# Patient Record
Sex: FEMALE | Race: ASIAN | NOT HISPANIC OR LATINO | URBAN - METROPOLITAN AREA
[De-identification: names, ages, dates, MRNs, and addresses within clinical notes are randomized per-mention and may not be internally consistent; named-entity substitution may affect disease eponyms.]

---

## 2018-08-06 ENCOUNTER — EMERGENCY (EMERGENCY)
Facility: HOSPITAL | Age: 43
LOS: 1 days | Discharge: HOME | End: 2018-08-06
Attending: EMERGENCY MEDICINE
Payer: MEDICAID

## 2018-08-06 VITALS
RESPIRATION RATE: 18 BRPM | SYSTOLIC BLOOD PRESSURE: 124 MMHG | HEART RATE: 100 BPM | TEMPERATURE: 99 F | DIASTOLIC BLOOD PRESSURE: 84 MMHG | OXYGEN SATURATION: 99 %

## 2018-08-06 VITALS
DIASTOLIC BLOOD PRESSURE: 73 MMHG | RESPIRATION RATE: 16 BRPM | SYSTOLIC BLOOD PRESSURE: 110 MMHG | OXYGEN SATURATION: 99 % | TEMPERATURE: 98 F | HEART RATE: 77 BPM

## 2018-08-06 LAB
APPEARANCE UR: CLEAR — SIGNIFICANT CHANGE UP
APTT BLD: 31.1 SEC — SIGNIFICANT CHANGE UP (ref 27.5–37.4)
BASOPHILS # BLD AUTO: 0.1 K/UL — SIGNIFICANT CHANGE UP (ref 0–0.2)
BASOPHILS NFR BLD AUTO: 1 % — SIGNIFICANT CHANGE UP (ref 0–2)
BILIRUB UR-MCNC: NEGATIVE — SIGNIFICANT CHANGE UP
COLOR SPEC: YELLOW — SIGNIFICANT CHANGE UP
DIFF PNL FLD: NEGATIVE — SIGNIFICANT CHANGE UP
EOSINOPHIL # BLD AUTO: 0.3 K/UL — SIGNIFICANT CHANGE UP (ref 0–0.5)
EOSINOPHIL NFR BLD AUTO: 3.1 % — SIGNIFICANT CHANGE UP (ref 0–6)
GLUCOSE UR QL: 500 MG/DL
HCG SERPL-ACNC: <2 MIU/ML — SIGNIFICANT CHANGE UP
HCT VFR BLD CALC: 33.7 % — LOW (ref 34.5–45)
HGB BLD-MCNC: 10.8 G/DL — LOW (ref 11.5–15.5)
INR BLD: 1.17 RATIO — HIGH (ref 0.88–1.16)
KETONES UR-MCNC: NEGATIVE — SIGNIFICANT CHANGE UP
LEUKOCYTE ESTERASE UR-ACNC: NEGATIVE — SIGNIFICANT CHANGE UP
LYMPHOCYTES # BLD AUTO: 3.1 K/UL — SIGNIFICANT CHANGE UP (ref 1–3.3)
LYMPHOCYTES # BLD AUTO: 34.5 % — SIGNIFICANT CHANGE UP (ref 13–44)
MCHC RBC-ENTMCNC: 26.7 PG — LOW (ref 27–34)
MCHC RBC-ENTMCNC: 32 GM/DL — SIGNIFICANT CHANGE UP (ref 32–36)
MCV RBC AUTO: 83.4 FL — SIGNIFICANT CHANGE UP (ref 80–100)
MONOCYTES # BLD AUTO: 0.7 K/UL — SIGNIFICANT CHANGE UP (ref 0–0.9)
MONOCYTES NFR BLD AUTO: 7.3 % — SIGNIFICANT CHANGE UP (ref 2–14)
NEUTROPHILS # BLD AUTO: 4.9 K/UL — SIGNIFICANT CHANGE UP (ref 1.8–7.4)
NEUTROPHILS NFR BLD AUTO: 54.1 % — SIGNIFICANT CHANGE UP (ref 43–77)
NITRITE UR-MCNC: NEGATIVE — SIGNIFICANT CHANGE UP
PH UR: 5.5 — SIGNIFICANT CHANGE UP (ref 5–8)
PLATELET # BLD AUTO: 199 K/UL — SIGNIFICANT CHANGE UP (ref 150–400)
PROT UR-MCNC: NEGATIVE — SIGNIFICANT CHANGE UP
PROTHROM AB SERPL-ACNC: 12.8 SEC — HIGH (ref 9.8–12.7)
RBC # BLD: 4.04 M/UL — SIGNIFICANT CHANGE UP (ref 3.8–5.2)
RBC # FLD: 12.6 % — SIGNIFICANT CHANGE UP (ref 10.3–14.5)
SP GR SPEC: 1.02 — SIGNIFICANT CHANGE UP (ref 1.01–1.02)
UROBILINOGEN FLD QL: NEGATIVE — SIGNIFICANT CHANGE UP
WBC # BLD: 9.1 K/UL — SIGNIFICANT CHANGE UP (ref 3.8–10.5)
WBC # FLD AUTO: 9.1 K/UL — SIGNIFICANT CHANGE UP (ref 3.8–10.5)

## 2018-08-06 PROCEDURE — 87491 CHLMYD TRACH DNA AMP PROBE: CPT

## 2018-08-06 PROCEDURE — 74177 CT ABD & PELVIS W/CONTRAST: CPT | Mod: 26

## 2018-08-06 PROCEDURE — 84702 CHORIONIC GONADOTROPIN TEST: CPT

## 2018-08-06 PROCEDURE — 99284 EMERGENCY DEPT VISIT MOD MDM: CPT

## 2018-08-06 PROCEDURE — 99284 EMERGENCY DEPT VISIT MOD MDM: CPT | Mod: 25

## 2018-08-06 PROCEDURE — 85027 COMPLETE CBC AUTOMATED: CPT

## 2018-08-06 PROCEDURE — 87591 N.GONORRHOEAE DNA AMP PROB: CPT

## 2018-08-06 PROCEDURE — 85610 PROTHROMBIN TIME: CPT

## 2018-08-06 PROCEDURE — 74177 CT ABD & PELVIS W/CONTRAST: CPT

## 2018-08-06 PROCEDURE — 85730 THROMBOPLASTIN TIME PARTIAL: CPT

## 2018-08-06 PROCEDURE — 81003 URINALYSIS AUTO W/O SCOPE: CPT

## 2018-08-06 PROCEDURE — 80053 COMPREHEN METABOLIC PANEL: CPT

## 2018-08-06 PROCEDURE — 96374 THER/PROPH/DIAG INJ IV PUSH: CPT | Mod: XU

## 2018-08-06 RX ORDER — SODIUM CHLORIDE 9 MG/ML
1000 INJECTION, SOLUTION INTRAVENOUS ONCE
Qty: 0 | Refills: 0 | Status: COMPLETED | OUTPATIENT
Start: 2018-08-06 | End: 2018-08-06

## 2018-08-06 RX ORDER — ACETAMINOPHEN 500 MG
1000 TABLET ORAL ONCE
Qty: 0 | Refills: 0 | Status: COMPLETED | OUTPATIENT
Start: 2018-08-06 | End: 2018-08-06

## 2018-08-06 RX ADMIN — Medication 400 MILLIGRAM(S): at 15:05

## 2018-08-06 RX ADMIN — Medication 1000 MILLIGRAM(S): at 15:05

## 2018-08-06 RX ADMIN — SODIUM CHLORIDE 1000 MILLILITER(S): 9 INJECTION, SOLUTION INTRAVENOUS at 15:05

## 2018-08-06 RX ADMIN — SODIUM CHLORIDE 4000 MILLILITER(S): 9 INJECTION, SOLUTION INTRAVENOUS at 15:05

## 2018-08-06 NOTE — ED PROVIDER NOTE - MEDICAL DECISION MAKING DETAILS
Attending MD Mccormack: 43F with rlq abd pain x 2-3 days, exam with mild  ttp RLQ, ddx includes appendicitis, colitis, diverticulitis, symptomatic ovarian cyst. Plan for CT a/p to eval, labs, UA

## 2018-08-06 NOTE — ED PROVIDER NOTE - PROGRESS NOTE DETAILS
right ac iv pulled patient discharged in good condition with copy of results and discussed follow up OB and return precautions. FREDDIE Edwards PGY2

## 2018-08-06 NOTE — ED PROVIDER NOTE - ATTENDING CONTRIBUTION TO CARE
Attending MD Mccormack:  I personally have seen and examined this patient.  Resident note reviewed and agree on plan of care and except where noted.  See HPI, PE, and MDM for details.

## 2018-08-06 NOTE — ED PROVIDER NOTE - PHYSICAL EXAMINATION
Attending MD Mccormack: A & O x 3, NAD, EOMI b/l, PERRL b/l; lungs CTAB, heart with reg rhythm without murmur; abdomen soft, ND, mild ttp RLQ without r/g; extremities with no edema; affect appropriate. neuro exam non focal with no motor or sensory deficits. Attending MD Mccormack: A & O x 3, NAD, EOMI b/l, PERRL b/l; lungs CTAB, heart with reg rhythm without murmur; abdomen soft, ND, mild ttp RLQ without r/g; extremities with no edema; affect appropriate. neuro exam non focal with no motor or sensory deficits.    Gen: NAD, non-toxic, conversational  Eyes: PERRL, EOMI   HENT: Normocephalic, atraumatic. External ears normal, no rhinorrhea, moist mucous membranes.   CV: RRR, no M/R/G  Resp: CTAB, non-labored, speaking without difficulty on room air  Abd: soft, non tender, non rigid, no guarding or rebound tenderness  Back: No CVAT bilaterally, no midline ttp  Skin: dry, wwp   Neuro: AOx3, speech is fluent and appropriate  Psych: Mood concerned, affect euthymic Attending MD Mccormack: A & O x 3, NAD, EOMI b/l, PERRL b/l; lungs CTAB, heart with reg rhythm without murmur; abdomen soft, ND, mild ttp RLQ without r/g; extremities with no edema; affect appropriate. neuro exam non focal with no motor or sensory deficits.    Gen: NAD, non-toxic, conversational  Eyes: PERRL, EOMI   HENT: Normocephalic, atraumatic. External ears normal, no rhinorrhea, moist mucous membranes.   CV: RRR, no M/R/G  Resp: CTAB, non-labored, speaking without difficulty on room air  Abd: soft, non tender, non rigid, no guarding or rebound tenderness  Pelvic (chaperone: PA Student last name Major): Spec: cervix is visualized, closed, no bleeding/friability, with moderate-large amount of surrounding mucoid grey discharge. Bimanual: no cervical motion tenderness, no adnexal tenderness, no masses palpated.   Back: No CVAT bilaterally, no midline ttp  Skin: dry, wwp   Neuro: AOx3, speech is fluent and appropriate  Psych: Mood concerned, affect euthymic

## 2018-08-06 NOTE — ED ADULT NURSE NOTE - NSIMPLEMENTINTERV_GEN_ALL_ED
Implemented All Universal Safety Interventions:  [place] to call system. Call bell, personal items and telephone within reach. Instruct patient to call for assistance. Room bathroom lighting operational. Non-slip footwear when patient is off stretcher. Physically safe environment: no spills, clutter or unnecessary equipment. Stretcher in lowest position, wheels locked, appropriate side rails in place.

## 2018-08-06 NOTE — ED PROVIDER NOTE - OBJECTIVE STATEMENT
43F with rlq pain for the past 48-72 hrs, notes that she does not have a hx of uterine fibroids but has been concerned that she may have developed them or other uterine/ovarian pathology. Has not noticed any fevers/chills, no vomiting/diarrhea/constipation, no chest pain, no difficulties breathing, no shortness of breath. Does not have any pain with urination. Denies other symptoms of concern at this time.

## 2018-08-06 NOTE — ED ADULT NURSE NOTE - OBJECTIVE STATEMENT
44 y/o female presenting to ED for RLQ pain x 3 days. Pt states "it feels like it is a stabbing pain." LMP 6-8 months ago. Upon exam, pt A&Ox3 gross neuro intact, no difficulty speaking in complete sentences, abdomen soft tender to palpation RLQ, bowel sounds x 4, nondistended.  c/o urinary frequency & pain. Pt denies chest pain, sob, ha, n/v/d, , f/c, hematuria.  Seen by MD . Labs drawn & sent.

## 2018-08-07 LAB
C TRACH RRNA SPEC QL NAA+PROBE: SIGNIFICANT CHANGE UP
N GONORRHOEA RRNA SPEC QL NAA+PROBE: SIGNIFICANT CHANGE UP
SPECIMEN SOURCE: SIGNIFICANT CHANGE UP

## 2018-08-15 ENCOUNTER — LABORATORY RESULT (OUTPATIENT)
Age: 43
End: 2018-08-15

## 2018-08-15 ENCOUNTER — APPOINTMENT (OUTPATIENT)
Dept: OBGYN | Facility: CLINIC | Age: 43
End: 2018-08-15
Payer: SELF-PAY

## 2018-08-15 ENCOUNTER — OUTPATIENT (OUTPATIENT)
Dept: OUTPATIENT SERVICES | Facility: HOSPITAL | Age: 43
LOS: 1 days | End: 2018-08-15
Payer: SELF-PAY

## 2018-08-15 ENCOUNTER — RESULT CHARGE (OUTPATIENT)
Age: 43
End: 2018-08-15

## 2018-08-15 VITALS
SYSTOLIC BLOOD PRESSURE: 110 MMHG | HEIGHT: 69 IN | WEIGHT: 198.25 LBS | BODY MASS INDEX: 29.36 KG/M2 | DIASTOLIC BLOOD PRESSURE: 70 MMHG

## 2018-08-15 DIAGNOSIS — R10.2 PELVIC AND PERINEAL PAIN: ICD-10-CM

## 2018-08-15 DIAGNOSIS — Z83.3 FAMILY HISTORY OF DIABETES MELLITUS: ICD-10-CM

## 2018-08-15 DIAGNOSIS — N91.2 AMENORRHEA, UNSPECIFIED: ICD-10-CM

## 2018-08-15 DIAGNOSIS — N76.0 ACUTE VAGINITIS: ICD-10-CM

## 2018-08-15 PROCEDURE — 84439 ASSAY OF FREE THYROXINE: CPT

## 2018-08-15 PROCEDURE — 36415 COLL VENOUS BLD VENIPUNCTURE: CPT

## 2018-08-15 PROCEDURE — 36415 COLL VENOUS BLD VENIPUNCTURE: CPT | Mod: NC

## 2018-08-15 PROCEDURE — 87624 HPV HI-RISK TYP POOLED RSLT: CPT

## 2018-08-15 PROCEDURE — 87491 CHLMYD TRACH DNA AMP PROBE: CPT

## 2018-08-15 PROCEDURE — 87340 HEPATITIS B SURFACE AG IA: CPT

## 2018-08-15 PROCEDURE — 87389 HIV-1 AG W/HIV-1&-2 AB AG IA: CPT

## 2018-08-15 PROCEDURE — 87591 N.GONORRHOEAE DNA AMP PROB: CPT

## 2018-08-15 PROCEDURE — 84443 ASSAY THYROID STIM HORMONE: CPT

## 2018-08-15 PROCEDURE — 99203 OFFICE O/P NEW LOW 30 MIN: CPT | Mod: NC

## 2018-08-15 PROCEDURE — 84146 ASSAY OF PROLACTIN: CPT

## 2018-08-15 PROCEDURE — 81003 URINALYSIS AUTO W/O SCOPE: CPT

## 2018-08-15 PROCEDURE — 86780 TREPONEMA PALLIDUM: CPT

## 2018-08-15 PROCEDURE — 81003 URINALYSIS AUTO W/O SCOPE: CPT | Mod: NC,QW

## 2018-08-15 PROCEDURE — 83001 ASSAY OF GONADOTROPIN (FSH): CPT

## 2018-08-15 PROCEDURE — G0463: CPT

## 2018-08-15 PROCEDURE — 86803 HEPATITIS C AB TEST: CPT

## 2018-08-16 LAB
C TRACH RRNA SPEC QL NAA+PROBE: SIGNIFICANT CHANGE UP
FSH SERPL-MCNC: 2.1 IU/L — SIGNIFICANT CHANGE UP
HBV SURFACE AG SER-ACNC: SIGNIFICANT CHANGE UP
HCV AB S/CO SERPL IA: 0.17 S/CO — SIGNIFICANT CHANGE UP
HCV AB SERPL-IMP: SIGNIFICANT CHANGE UP
HIV 1+2 AB+HIV1 P24 AG SERPL QL IA: SIGNIFICANT CHANGE UP
HPV HIGH+LOW RISK DNA PNL CVX: SIGNIFICANT CHANGE UP
N GONORRHOEA RRNA SPEC QL NAA+PROBE: SIGNIFICANT CHANGE UP
PROLACTIN SERPL-MCNC: 10.9 NG/ML — SIGNIFICANT CHANGE UP (ref 3.4–24.1)
SPECIMEN SOURCE: SIGNIFICANT CHANGE UP
T PALLIDUM AB TITR SER: NEGATIVE — SIGNIFICANT CHANGE UP
T4 FREE SERPL-MCNC: 1.1 NG/DL — SIGNIFICANT CHANGE UP (ref 0.9–1.8)
T4 FREE+ TSH PNL SERPL: 6.88 UIU/ML — HIGH (ref 0.27–4.2)

## 2018-08-18 LAB — CYTOLOGY SPEC DOC CYTO: SIGNIFICANT CHANGE UP

## 2018-08-20 ENCOUNTER — RESULT REVIEW (OUTPATIENT)
Age: 43
End: 2018-08-20

## 2018-08-22 ENCOUNTER — FORM ENCOUNTER (OUTPATIENT)
Age: 43
End: 2018-08-22

## 2018-08-23 ENCOUNTER — OUTPATIENT (OUTPATIENT)
Dept: OUTPATIENT SERVICES | Facility: HOSPITAL | Age: 43
LOS: 1 days | End: 2018-08-23
Payer: SELF-PAY

## 2018-08-23 ENCOUNTER — APPOINTMENT (OUTPATIENT)
Dept: ULTRASOUND IMAGING | Facility: IMAGING CENTER | Age: 43
End: 2018-08-23
Payer: COMMERCIAL

## 2018-08-23 ENCOUNTER — APPOINTMENT (OUTPATIENT)
Dept: MAMMOGRAPHY | Facility: IMAGING CENTER | Age: 43
End: 2018-08-23
Payer: COMMERCIAL

## 2018-08-23 DIAGNOSIS — N91.2 AMENORRHEA, UNSPECIFIED: ICD-10-CM

## 2018-08-23 DIAGNOSIS — N76.0 ACUTE VAGINITIS: ICD-10-CM

## 2018-08-23 PROCEDURE — 76856 US EXAM PELVIC COMPLETE: CPT | Mod: 26

## 2018-08-23 PROCEDURE — 76830 TRANSVAGINAL US NON-OB: CPT | Mod: 26

## 2018-08-23 PROCEDURE — 77063 BREAST TOMOSYNTHESIS BI: CPT | Mod: 26

## 2018-08-23 PROCEDURE — 77067 SCR MAMMO BI INCL CAD: CPT | Mod: 26

## 2018-08-23 PROCEDURE — 76856 US EXAM PELVIC COMPLETE: CPT

## 2018-08-23 PROCEDURE — 77067 SCR MAMMO BI INCL CAD: CPT

## 2018-08-23 PROCEDURE — 77063 BREAST TOMOSYNTHESIS BI: CPT

## 2018-08-23 PROCEDURE — 76830 TRANSVAGINAL US NON-OB: CPT

## 2018-08-27 DIAGNOSIS — R10.2 PELVIC AND PERINEAL PAIN: ICD-10-CM

## 2018-08-27 DIAGNOSIS — N91.2 AMENORRHEA, UNSPECIFIED: ICD-10-CM

## 2018-08-27 DIAGNOSIS — E03.9 HYPOTHYROIDISM, UNSPECIFIED: ICD-10-CM

## 2018-08-29 LAB
BILIRUB UR QL STRIP: NORMAL
CLARITY UR: NORMAL
COLLECTION METHOD: NORMAL
GLUCOSE UR-MCNC: 500
HCG UR QL: 0.2 EU/DL
HGB UR QL STRIP.AUTO: NORMAL
KETONES UR-MCNC: NORMAL
LEUKOCYTE ESTERASE UR QL STRIP: NORMAL
NITRITE UR QL STRIP: NORMAL
PH UR STRIP: 5.5
PROT UR STRIP-MCNC: NORMAL
SP GR UR STRIP: 1015

## 2019-12-25 PROBLEM — R10.2 PELVIC PAIN IN FEMALE: Status: ACTIVE | Noted: 2018-08-15

## 2023-03-17 ENCOUNTER — INPATIENT (INPATIENT)
Facility: HOSPITAL | Age: 48
LOS: 15 days | Discharge: HOME HEALTH | DRG: 233 | End: 2023-04-02
Attending: THORACIC SURGERY (CARDIOTHORACIC VASCULAR SURGERY) | Admitting: INTERNAL MEDICINE
Payer: MEDICAID

## 2023-03-17 VITALS
SYSTOLIC BLOOD PRESSURE: 142 MMHG | WEIGHT: 171.08 LBS | TEMPERATURE: 98 F | HEART RATE: 101 BPM | DIASTOLIC BLOOD PRESSURE: 83 MMHG | RESPIRATION RATE: 19 BRPM | OXYGEN SATURATION: 100 % | HEIGHT: 66 IN

## 2023-03-17 DIAGNOSIS — Z98.891 HISTORY OF UTERINE SCAR FROM PREVIOUS SURGERY: Chronic | ICD-10-CM

## 2023-03-17 LAB
ALBUMIN SERPL ELPH-MCNC: 4.5 G/DL — SIGNIFICANT CHANGE UP (ref 3.3–5)
ALP SERPL-CCNC: 65 U/L — SIGNIFICANT CHANGE UP (ref 40–120)
ALT FLD-CCNC: 11 U/L — SIGNIFICANT CHANGE UP (ref 10–45)
ANION GAP SERPL CALC-SCNC: 10 MMOL/L — SIGNIFICANT CHANGE UP (ref 5–17)
AST SERPL-CCNC: 12 U/L — SIGNIFICANT CHANGE UP (ref 10–40)
BASE EXCESS BLDV CALC-SCNC: 3.2 MMOL/L — HIGH (ref -2–3)
BASOPHILS # BLD AUTO: 0.05 K/UL — SIGNIFICANT CHANGE UP (ref 0–0.2)
BASOPHILS NFR BLD AUTO: 0.6 % — SIGNIFICANT CHANGE UP (ref 0–2)
BILIRUB SERPL-MCNC: 0.2 MG/DL — SIGNIFICANT CHANGE UP (ref 0.2–1.2)
BUN SERPL-MCNC: 12 MG/DL — SIGNIFICANT CHANGE UP (ref 7–23)
CA-I SERPL-SCNC: 1.3 MMOL/L — SIGNIFICANT CHANGE UP (ref 1.15–1.33)
CALCIUM SERPL-MCNC: 9.8 MG/DL — SIGNIFICANT CHANGE UP (ref 8.4–10.5)
CHLORIDE BLDV-SCNC: 106 MMOL/L — SIGNIFICANT CHANGE UP (ref 96–108)
CHLORIDE SERPL-SCNC: 104 MMOL/L — SIGNIFICANT CHANGE UP (ref 96–108)
CO2 BLDV-SCNC: 30 MMOL/L — HIGH (ref 22–26)
CO2 SERPL-SCNC: 26 MMOL/L — SIGNIFICANT CHANGE UP (ref 22–31)
CREAT SERPL-MCNC: 0.52 MG/DL — SIGNIFICANT CHANGE UP (ref 0.5–1.3)
D DIMER BLD IA.RAPID-MCNC: <150 NG/ML DDU — SIGNIFICANT CHANGE UP
EGFR: 117 ML/MIN/1.73M2 — SIGNIFICANT CHANGE UP
EOSINOPHIL # BLD AUTO: 0.14 K/UL — SIGNIFICANT CHANGE UP (ref 0–0.5)
EOSINOPHIL NFR BLD AUTO: 1.6 % — SIGNIFICANT CHANGE UP (ref 0–6)
GAS PNL BLDV: 138 MMOL/L — SIGNIFICANT CHANGE UP (ref 136–145)
GAS PNL BLDV: SIGNIFICANT CHANGE UP
GAS PNL BLDV: SIGNIFICANT CHANGE UP
GLUCOSE BLDV-MCNC: SIGNIFICANT CHANGE UP MG/DL (ref 70–99)
GLUCOSE SERPL-MCNC: 160 MG/DL — HIGH (ref 70–99)
HCG SERPL-ACNC: <2 MIU/ML — SIGNIFICANT CHANGE UP
HCO3 BLDV-SCNC: 29 MMOL/L — SIGNIFICANT CHANGE UP (ref 22–29)
HCT VFR BLD CALC: 33.7 % — LOW (ref 34.5–45)
HCT VFR BLDA CALC: 33 % — LOW (ref 34.5–46.5)
HGB BLD CALC-MCNC: 10.9 G/DL — LOW (ref 11.7–16.1)
HGB BLD-MCNC: 10.4 G/DL — LOW (ref 11.5–15.5)
IMM GRANULOCYTES NFR BLD AUTO: 0.2 % — SIGNIFICANT CHANGE UP (ref 0–0.9)
LACTATE BLDV-MCNC: 1.2 MMOL/L — SIGNIFICANT CHANGE UP (ref 0.5–2)
LIDOCAIN IGE QN: 27 U/L — SIGNIFICANT CHANGE UP (ref 7–60)
LYMPHOCYTES # BLD AUTO: 3.68 K/UL — HIGH (ref 1–3.3)
LYMPHOCYTES # BLD AUTO: 41.8 % — SIGNIFICANT CHANGE UP (ref 13–44)
MAGNESIUM SERPL-MCNC: 1.7 MG/DL — SIGNIFICANT CHANGE UP (ref 1.6–2.6)
MCHC RBC-ENTMCNC: 26.7 PG — LOW (ref 27–34)
MCHC RBC-ENTMCNC: 30.9 GM/DL — LOW (ref 32–36)
MCV RBC AUTO: 86.6 FL — SIGNIFICANT CHANGE UP (ref 80–100)
MONOCYTES # BLD AUTO: 0.58 K/UL — SIGNIFICANT CHANGE UP (ref 0–0.9)
MONOCYTES NFR BLD AUTO: 6.6 % — SIGNIFICANT CHANGE UP (ref 2–14)
NEUTROPHILS # BLD AUTO: 4.33 K/UL — SIGNIFICANT CHANGE UP (ref 1.8–7.4)
NEUTROPHILS NFR BLD AUTO: 49.2 % — SIGNIFICANT CHANGE UP (ref 43–77)
NRBC # BLD: 0 /100 WBCS — SIGNIFICANT CHANGE UP (ref 0–0)
PCO2 BLDV: 49 MMHG — HIGH (ref 39–42)
PH BLDV: 7.38 — SIGNIFICANT CHANGE UP (ref 7.32–7.43)
PLATELET # BLD AUTO: 242 K/UL — SIGNIFICANT CHANGE UP (ref 150–400)
PO2 BLDV: 37 MMHG — SIGNIFICANT CHANGE UP (ref 25–45)
POTASSIUM BLDV-SCNC: 3.5 MMOL/L — SIGNIFICANT CHANGE UP (ref 3.5–5.1)
POTASSIUM SERPL-MCNC: 3.6 MMOL/L — SIGNIFICANT CHANGE UP (ref 3.5–5.3)
POTASSIUM SERPL-SCNC: 3.6 MMOL/L — SIGNIFICANT CHANGE UP (ref 3.5–5.3)
PROT SERPL-MCNC: 7.4 G/DL — SIGNIFICANT CHANGE UP (ref 6–8.3)
RBC # BLD: 3.89 M/UL — SIGNIFICANT CHANGE UP (ref 3.8–5.2)
RBC # FLD: 13.3 % — SIGNIFICANT CHANGE UP (ref 10.3–14.5)
SAO2 % BLDV: 60.1 % — LOW (ref 67–88)
SODIUM SERPL-SCNC: 140 MMOL/L — SIGNIFICANT CHANGE UP (ref 135–145)
TROPONIN T, HIGH SENSITIVITY RESULT: <6 NG/L — SIGNIFICANT CHANGE UP (ref 0–51)
WBC # BLD: 8.8 K/UL — SIGNIFICANT CHANGE UP (ref 3.8–10.5)
WBC # FLD AUTO: 8.8 K/UL — SIGNIFICANT CHANGE UP (ref 3.8–10.5)

## 2023-03-17 PROCEDURE — 71046 X-RAY EXAM CHEST 2 VIEWS: CPT | Mod: 26

## 2023-03-17 PROCEDURE — 99285 EMERGENCY DEPT VISIT HI MDM: CPT

## 2023-03-17 PROCEDURE — 70450 CT HEAD/BRAIN W/O DYE: CPT | Mod: 26,MA

## 2023-03-17 PROCEDURE — 70486 CT MAXILLOFACIAL W/O DYE: CPT | Mod: 26,MA

## 2023-03-17 RX ORDER — SODIUM CHLORIDE 9 MG/ML
500 INJECTION INTRAMUSCULAR; INTRAVENOUS; SUBCUTANEOUS ONCE
Refills: 0 | Status: COMPLETED | OUTPATIENT
Start: 2023-03-17 | End: 2023-03-17

## 2023-03-17 RX ORDER — METOCLOPRAMIDE HCL 10 MG
10 TABLET ORAL ONCE
Refills: 0 | Status: COMPLETED | OUTPATIENT
Start: 2023-03-17 | End: 2023-03-17

## 2023-03-17 NOTE — ED ADULT NURSE NOTE - BEFAST BALANCE
Airway  Urgency: elective      General Information and Staff    Patient location during procedure: OR  Anesthesiologist: Osvaldo Tamez MD  Performed: anesthesiologist     Indications and Patient Condition  Indications for airway management: anesthe
Peripheral IV  Date/Time: 12/17/2020 3:34 PM  Inserted by: Margret Burkitt, MD    Placement  Needle size: 18 G  Laterality: right  Location: hand  Site prep: alcohol  Technique: anatomical landmarks  Attempts: 1
Regional Block  Performed by: Karis Clinton MD  Authorized by: Karis Clinton MD       General Information and Staff    Start Time:  12/17/2020 5:30 PM  End Time:  12/17/2020 5:31 PM  Anesthesiologist:  Karis Clinton MD  Performed by
Regional Block  Performed by: Melani Pryor MD  Authorized by: Melani Pryor MD       General Information and Staff    Start Time:  12/17/2020 5:32 PM  End Time:  12/17/2020 5:33 PM  Anesthesiologist:  Melani Pryor MD  Performed by
No

## 2023-03-17 NOTE — ED ADULT NURSE NOTE - ED STAT RN HANDOFF WHERE
The patient has been re-examined and I agree with the above assessment or I updated with my findings. pt going to cdu

## 2023-03-17 NOTE — ED PROVIDER NOTE - NS ED ROS FT
CONSTITUTIONAL - No fever, No weight change, No lightheadedness  SKIN - No rash  HEMATOLOGIC - No abnormal bleeding or bruising  EYES - No eye pain, No blurred vision  ENT - No change in hearing, No sore throat, No neck pain, No rhinorrhea, No ear pain  RESPIRATORY - No shortness of breath, No cough  CARDIAC -  +chest pain, No palpitations  GI - No abdominal pain, No nausea, No vomiting, No diarrhea, No constipation  - No dysuria, no frequency, no hematuria.   MUSCULOSKELETAL - No joint pain, No swelling, No back pain  NEUROLOGIC - No numbness, No focal weakness, +headache, No dizziness

## 2023-03-17 NOTE — ED ADULT TRIAGE NOTE - IDEAL BODY WEIGHT(KG)
59 Manual Repair Warning Statement: We plan on removing the manually selected variable below in favor of our much easier automatic structured text blocks found in the previous tab. We decided to do this to help make the flow better and give you the full power of structured data. Manual selection is never going to be ideal in our platform and I would encourage you to avoid using manual selection from this point on, especially since I will be sunsetting this feature. It is important that you do one of two things with the customized text below. First, you can save all of the text in a word file so you can have it for future reference. Second, transfer the text to the appropriate area in the Library tab. Lastly, if there is a flap or graft type which we do not have you need to let us know right away so I can add it in before the variable is hidden. No need to panic, we plan to give you roughly 6 months to make the change.

## 2023-03-17 NOTE — ED PROVIDER NOTE - ATTENDING CONTRIBUTION TO CARE
Private Physician Yung Liang pcp/[facility]ah h[facility]ts  47y female pmh DMT2 on metformin. No habits,hld,htn,cancer,cva,cad,trave,ocp,leg painl. Pt comes to ed c/o left chest pain past two days Pain 5/10. Off/on, lasts few hours, recurred this am. Pt employed as  and did not  miss work. Assocated w shortness of breath, no cough, hemoptysis, fever and chills, abd pain. No hx cad/mi. No prior hx of having stress test. Pt also c/o ha, rt side not associated w cp. sharp rad to occiput lasts hours. took Excedrin w relief. Has had similar ha for years, over past two months, has felt like she had inflammation, No facial pain. seems related to expectoration with "nasty taste in  my mouth" PE Private Physician Yung Liang pcp/[facility]ah hgts  47y female pmh DMT2 on metformin. No habits,hld,htn,cancer,cva,cad,trave,ocp,leg painl. Pt comes to ed c/o left chest pain past two days Pain 5/10. Off/on, lasts few hours, recurred this am. Pt employed as  and did not  miss work. Assocated w shortness of breath, no cough, hemoptysis, fever and chills, abd pain. No hx cad/mi. No prior hx of having stress test. Pt also c/o ha, rt side not associated w cp. sharp rad to occiput lasts hours. took Excedrin w relief. Has had similar ha for years, over past two months, has felt like she had inflammation, No facial pain. seems related to expectoration with "nasty taste in  my mouth" PE well developed and well nourished female awake alert normocephalic atraumatic neck supple chest clear anterior & posterior cv no rubs, gallops or murmurs abd soft +bs no mass guarding neruo gcs 15 speech fleunt power 5/5 all extr  Steve Coughlin MD, Facep

## 2023-03-17 NOTE — ED ADULT NURSE NOTE - OBJECTIVE STATEMENT
47y F with PMHx of headaches, DM presents to the ED for a headache since Wednesday. Pt is also endorsing mild L sided chest pain radiating to the L shoulder. Pt has experienced similar symptoms about 3 years ago and went to see a doctor in [facility] and was not diagnosed with anything. Pt took Excedrin around mid-day today and feels better. Denies SOB, dizziness, numbness, tingling, vision changes. Placed on CM, NSR. Pt safety and comfort measures provided.

## 2023-03-17 NOTE — ED PROVIDER NOTE - NSICDXPASTMEDICALHX_GEN_ALL_CORE_FT
PAST MEDICAL HISTORY:  COVID-19 vaccine series completed     DM (diabetes mellitus) On metformin

## 2023-03-17 NOTE — ED PROVIDER NOTE - OBJECTIVE STATEMENT
47-year-old female with past medical history type 2 diabetes presenting with headache and chest pain.  Patient reports she has had headaches 5 days/week for many years, today's headache started yesterday, and is a little worse than normal, however is getting better after taking Excedrin.  She denies any blurry vision double vision focal weakness, sensitivity to light, chest pain shortness of breath, nausea vomiting, diarrhea, abdominal pain.  She is never seen a neurologist for her headaches, she typically takes Excedrin.  No identifiable trigger to her headaches although they often occur during her menstrual cycle, however sometimes they occur randomly as well.  She also reports for the last 2 days having exertional chest pain, which is left-sided and radiates to the shoulder, is not associate with shortness of breath or nausea or diaphoresis, she is never had this pain before, no palpitations, went away on its own.

## 2023-03-17 NOTE — ED PROVIDER NOTE - PHYSICAL EXAMINATION
GENERAL: Sitting comfortably in bed in no acute distress  NEURO: Alert and Oriented to person, place, date and situation. Pupils symmetric, No ptosis. EOMI, No facial asymmetry or dysarthria, no tremor noted.5/5 strength all 4 limbs.  HEENT: No conjunctival injection or scleral icterus.   CARD: Normal rate and regular rhythm, no murmurs and no gallops appreciated.  RESP: Clear to auscultation bilaterally, No wheezes, rales or rhonchi. Good respiratory effort.  ABD: Nondistended, Soft and nontender to palpation in all quadrants, no guarding, no rigidity. No masses appreciated.  EXT: No pedal edema. 2+DP pulses bilaterally.  SKIN: No rashes, bruising or acute skin injuries on face, limbs, abdomen, chest or back

## 2023-03-17 NOTE — ED PROVIDER NOTE - CLINICAL SUMMARY MEDICAL DECISION MAKING FREE TEXT BOX
47-year-old female past medical history headaches presenting with 2 days of headache and 2 days of exertional chest pain.  Vital signs normal on arrival, neuro exam normal, heart and lungs clear.  Will do cardiac work-up including labs with a troponin chest x-ray and EKG for the chest pain.  Additionally will get CT head and max face for her headache and give Reglan.

## 2023-03-18 DIAGNOSIS — G43.909 MIGRAINE, UNSPECIFIED, NOT INTRACTABLE, WITHOUT STATUS MIGRAINOSUS: ICD-10-CM

## 2023-03-18 LAB
A1C WITH ESTIMATED AVERAGE GLUCOSE RESULT: 8.3 % — HIGH (ref 4–5.6)
CHOLEST SERPL-MCNC: 181 MG/DL — SIGNIFICANT CHANGE UP
ESTIMATED AVERAGE GLUCOSE: 192 MG/DL — HIGH (ref 68–114)
GLUCOSE BLDC GLUCOMTR-MCNC: 127 MG/DL — HIGH (ref 70–99)
GLUCOSE BLDC GLUCOMTR-MCNC: 154 MG/DL — HIGH (ref 70–99)
GLUCOSE BLDC GLUCOMTR-MCNC: 171 MG/DL — HIGH (ref 70–99)
GLUCOSE BLDC GLUCOMTR-MCNC: 179 MG/DL — HIGH (ref 70–99)
HDLC SERPL-MCNC: 42 MG/DL — LOW
LIPID PNL WITH DIRECT LDL SERPL: 108 MG/DL — HIGH
NON HDL CHOLESTEROL: 139 MG/DL — HIGH
RAPID RVP RESULT: SIGNIFICANT CHANGE UP
SARS-COV-2 RNA SPEC QL NAA+PROBE: SIGNIFICANT CHANGE UP
TRIGL SERPL-MCNC: 157 MG/DL — HIGH

## 2023-03-18 PROCEDURE — G1004: CPT

## 2023-03-18 PROCEDURE — 99223 1ST HOSP IP/OBS HIGH 75: CPT

## 2023-03-18 PROCEDURE — 99255 IP/OBS CONSLTJ NEW/EST HI 80: CPT

## 2023-03-18 PROCEDURE — 75574 CT ANGIO HRT W/3D IMAGE: CPT | Mod: 26,MG

## 2023-03-18 PROCEDURE — 99236 HOSP IP/OBS SAME DATE HI 85: CPT

## 2023-03-18 RX ORDER — SODIUM CHLORIDE 9 MG/ML
1000 INJECTION INTRAMUSCULAR; INTRAVENOUS; SUBCUTANEOUS ONCE
Refills: 0 | Status: COMPLETED | OUTPATIENT
Start: 2023-03-18 | End: 2023-03-18

## 2023-03-18 RX ORDER — METFORMIN HYDROCHLORIDE 850 MG/1
0 TABLET ORAL
Qty: 0 | Refills: 0 | DISCHARGE

## 2023-03-18 RX ORDER — GLUCAGON INJECTION, SOLUTION 0.5 MG/.1ML
1 INJECTION, SOLUTION SUBCUTANEOUS ONCE
Refills: 0 | Status: DISCONTINUED | OUTPATIENT
Start: 2023-03-18 | End: 2023-03-28

## 2023-03-18 RX ORDER — INSULIN LISPRO 100/ML
VIAL (ML) SUBCUTANEOUS AT BEDTIME
Refills: 0 | Status: DISCONTINUED | OUTPATIENT
Start: 2023-03-18 | End: 2023-03-28

## 2023-03-18 RX ORDER — ATORVASTATIN CALCIUM 80 MG/1
80 TABLET, FILM COATED ORAL AT BEDTIME
Refills: 0 | Status: DISCONTINUED | OUTPATIENT
Start: 2023-03-18 | End: 2023-03-28

## 2023-03-18 RX ORDER — DEXTROSE 50 % IN WATER 50 %
25 SYRINGE (ML) INTRAVENOUS ONCE
Refills: 0 | Status: DISCONTINUED | OUTPATIENT
Start: 2023-03-18 | End: 2023-03-28

## 2023-03-18 RX ORDER — LANOLIN ALCOHOL/MO/W.PET/CERES
5 CREAM (GRAM) TOPICAL AT BEDTIME
Refills: 0 | Status: DISCONTINUED | OUTPATIENT
Start: 2023-03-18 | End: 2023-03-28

## 2023-03-18 RX ORDER — KETOROLAC TROMETHAMINE 30 MG/ML
15 SYRINGE (ML) INJECTION EVERY 6 HOURS
Refills: 0 | Status: DISCONTINUED | OUTPATIENT
Start: 2023-03-18 | End: 2023-03-18

## 2023-03-18 RX ORDER — DEXTROSE 50 % IN WATER 50 %
15 SYRINGE (ML) INTRAVENOUS ONCE
Refills: 0 | Status: DISCONTINUED | OUTPATIENT
Start: 2023-03-18 | End: 2023-03-28

## 2023-03-18 RX ORDER — ASPIRIN/CALCIUM CARB/MAGNESIUM 324 MG
81 TABLET ORAL DAILY
Refills: 0 | Status: DISCONTINUED | OUTPATIENT
Start: 2023-03-18 | End: 2023-03-28

## 2023-03-18 RX ORDER — SODIUM CHLORIDE 9 MG/ML
1000 INJECTION, SOLUTION INTRAVENOUS
Refills: 0 | Status: DISCONTINUED | OUTPATIENT
Start: 2023-03-18 | End: 2023-03-28

## 2023-03-18 RX ORDER — INSULIN LISPRO 100/ML
VIAL (ML) SUBCUTANEOUS
Refills: 0 | Status: DISCONTINUED | OUTPATIENT
Start: 2023-03-18 | End: 2023-03-28

## 2023-03-18 RX ORDER — ACETAMINOPHEN 500 MG
1000 TABLET ORAL ONCE
Refills: 0 | Status: COMPLETED | OUTPATIENT
Start: 2023-03-18 | End: 2023-03-18

## 2023-03-18 RX ORDER — MAGNESIUM SULFATE 500 MG/ML
2 VIAL (ML) INJECTION
Refills: 0 | Status: COMPLETED | OUTPATIENT
Start: 2023-03-18 | End: 2023-03-19

## 2023-03-18 RX ORDER — DEXTROSE 50 % IN WATER 50 %
12.5 SYRINGE (ML) INTRAVENOUS ONCE
Refills: 0 | Status: DISCONTINUED | OUTPATIENT
Start: 2023-03-18 | End: 2023-03-28

## 2023-03-18 RX ORDER — METOPROLOL TARTRATE 50 MG
25 TABLET ORAL DAILY
Refills: 0 | Status: DISCONTINUED | OUTPATIENT
Start: 2023-03-18 | End: 2023-03-19

## 2023-03-18 RX ADMIN — Medication 5 MILLIGRAM(S): at 23:17

## 2023-03-18 RX ADMIN — Medication 400 MILLIGRAM(S): at 19:25

## 2023-03-18 RX ADMIN — Medication 0: at 18:38

## 2023-03-18 RX ADMIN — Medication 1: at 13:05

## 2023-03-18 RX ADMIN — Medication 1: at 08:48

## 2023-03-18 RX ADMIN — SODIUM CHLORIDE 500 MILLILITER(S): 9 INJECTION INTRAMUSCULAR; INTRAVENOUS; SUBCUTANEOUS at 00:19

## 2023-03-18 RX ADMIN — Medication 1000 MILLIGRAM(S): at 19:55

## 2023-03-18 RX ADMIN — ATORVASTATIN CALCIUM 80 MILLIGRAM(S): 80 TABLET, FILM COATED ORAL at 21:54

## 2023-03-18 RX ADMIN — SODIUM CHLORIDE 1000 MILLILITER(S): 9 INJECTION INTRAMUSCULAR; INTRAVENOUS; SUBCUTANEOUS at 07:11

## 2023-03-18 RX ADMIN — Medication 81 MILLIGRAM(S): at 19:26

## 2023-03-18 RX ADMIN — Medication 10 MILLIGRAM(S): at 00:19

## 2023-03-18 RX ADMIN — SODIUM CHLORIDE 500 MILLILITER(S): 9 INJECTION INTRAMUSCULAR; INTRAVENOUS; SUBCUTANEOUS at 02:00

## 2023-03-18 RX ADMIN — Medication 50 GRAM(S): at 21:54

## 2023-03-18 NOTE — ED CDU PROVIDER INITIAL DAY NOTE - WET READ LAUNCH FT
Pt A&Ox4. Pt sitting on cot; presents to ED with left sided shoulder pain due to falling into a machine at work yesterday. Limited range of motion; swelling in hand; pt is able to feel still and no numbness present at this time.      Dede Hoyt RN  10/20/22 201 Arcelia Murillo RN  10/20/22 [id number] There are no Wet Read(s) to document.

## 2023-03-18 NOTE — ED CDU PROVIDER INITIAL DAY NOTE - NSFOLLOWUPINSTRUCTIONS_ED_ALL_ED_FT
I have personally performed a face to face diagnostic evaluation on this patient.  I have reviewed the ACP note and agree with the history, exam, and plan of care, except as noted.  History and Exam by me shows  See Ed provider note  Steve Coughlin MD, Facep

## 2023-03-18 NOTE — H&P ADULT - ASSESSMENT
47 f with    CAD/ Chest pain/ Abnormal CT coronaries   - telemetry  - ASA  - BB  - Cardiology evaluation   - cath pending     Diabetes Mellitus  - BS control  - ADA diet     DVT prophylaxis  - low risk     Further action as per clinical course     Jan Mitchell MD phone [phone]

## 2023-03-18 NOTE — H&P ADULT - NSHPPHYSICALEXAM_GEN_ALL_CORE
PHYSICAL EXAMINATION:  Vital Signs Last 24 Hrs  T(C): 36.7 (18 Mar 2023 18:59), Max: 37.2 (18 Mar 2023 17:04)  T(F): 98.1 (18 Mar 2023 18:59), Max: 98.9 (18 Mar 2023 17:04)  HR: 88 (18 Mar 2023 18:59) (73 - 88)  BP: 133/84 (18 Mar 2023 18:59) (112/70 - 134/76)  BP(mean): 96 (17 Mar 2023 22:47) (96 - 96)  RR: 18 (18 Mar 2023 18:59) (16 - 20)  SpO2: 100% (18 Mar 2023 18:59) (100% - 100%)    Parameters below as of 18 Mar 2023 18:59  Patient On (Oxygen Delivery Method): room air      CAPILLARY BLOOD GLUCOSE      POCT Blood Glucose.: 127 mg/dL (18 Mar 2023 18:22)  POCT Blood Glucose.: 171 mg/dL (18 Mar 2023 12:56)  POCT Blood Glucose.: 154 mg/dL (18 Mar 2023 08:28)  POCT Blood Glucose.: 107 mg/dL (18 Mar 2023 01:59)      GENERAL: NAD, well-groomed, well-developed  HEAD:  atraumatic, normocephalic  EYES: sclera anicteric  ENMT: mucous membranes moist  NECK: supple, No JVD  CHEST/LUNG: clear to auscultation bilaterally; no rales, rhonchi, or wheezing b/l  HEART: normal S1, S2  ABDOMEN: BS+, soft, ND, NT   EXTREMITIES:  pulses palpable; no clubbing, cyanosis, or edema b/l LEs  NEURO: awake, alert, interactive; moves all extremities  SKIN: no rashes or lesions

## 2023-03-18 NOTE — ED CDU PROVIDER INITIAL DAY NOTE - NS ED ROS FT
Constitutional: No fever or chills  Eyes: No visual changes, no eye pain   CV: +chest pain No new lower extremity edema  Resp: No SOB no cough  GI: No abd pain. No nausea or vomiting. No diarrhea.  : No dysuria, hematuria.   MSK: No musculoskeletal pain  Skin: No rash  Psych: No complaints   Neuro: +headache. No numbness or tingling. No weakness.  Endo: +known diabetes

## 2023-03-18 NOTE — ED CDU PROVIDER DISPOSITION NOTE - CLINICAL COURSE
47-year-old female with past medical history type 2 diabetes presenting with headache and chest pain.  Patient reports she has headaches a few days a week, taking Excedrin with pain relief. This headache started on Wednesday, which may be a little worse than her usual. Denies visual changes, weakness, vomiting. Does not have a neurologist. She also reports that she has been having a few days of intermittent left-sided chest/shoulder pain, not associate with shortness of breath, nausea, diaphoresis. She denies any visual changes, weakness, diarrhea, abdominal pain, urinary complaints. Non-smoker. No established cardiologist, no cardiac workup in the past. Father with history of open heart surgery.    ED course: EKG NSR. Troponin <6. CTs negative. Plan for pain control and CTA coronaries in CDU. 47-year-old female with past medical history type 2 diabetes presenting with headache and chest pain.  Patient reports she has headaches a few days a week, taking Excedrin with pain relief. This headache started on Wednesday, which may be a little worse than her usual. Denies visual changes, weakness, vomiting. Does not have a neurologist. She also reports that she has been having a few days of intermittent left-sided chest/shoulder pain, not associate with shortness of breath, nausea, diaphoresis. She denies any visual changes, weakness, diarrhea, abdominal pain, urinary complaints. Non-smoker. No established cardiologist, no cardiac workup in the past. Father with history of open heart surgery.    ED course: EKG NSR. Troponin <6. CTs negative. Plan for pain control and CTA coronaries in CDU.Patient resting in bed comfortably. No distress, no complaints. Vital Signs Stable. No events on telemetry monitor; CTC with severe pLAD lesion. pt with no symx. pt seen and evaluated by the Cards fellow. needs to be admitted for cath tomorrow. NPO after midnight. plan of care discussed with pt and Dr. Cook. Will admit to Dr. Mitchell. MAURY Weiss

## 2023-03-18 NOTE — ED CDU PROVIDER DISPOSITION NOTE - ATTENDING APP SHARED VISIT CONTRIBUTION OF CARE
Attending MD Cook:  I have personally performed a face to face diagnostic evaluation on this patient.  I have reviewed the ACP note and agree with the history, exam, and plan of care, except as noted.  Pt presenting with chest pain. placed in cdu for cardiac work up and monitoring

## 2023-03-18 NOTE — ED CDU PROVIDER INITIAL DAY NOTE - CLINICAL SUMMARY MEDICAL DECISION MAKING FREE TEXT BOX
Adult female pw ha and cp, concerns for acs,  Check serial ekg,tele and ctc in am  Steve Coughlin MD, Facep

## 2023-03-18 NOTE — ED CDU PROVIDER INITIAL DAY NOTE - PROGRESS NOTE DETAILS
Patient resting in bed comfortably. No distress, no complaints. Vital Signs Stable. No events on telemetry monitor; CTC with severe pLAD lesion. pt with no symx. pt seen and evaluated by the Cards fellow. needs to be admitted for cath tomorrow. NPO after midnight. plan of care discussed with pt and Dr. Cook. Will admit to Dr. Mitchell. -NOHELIA Weiss

## 2023-03-18 NOTE — ED CDU PROVIDER DISPOSITION NOTE - NSFOLLOWUPINSTRUCTIONS_ED_ALL_ED_FT
Hydrate.     You may be contacted by our Emergency Department Referrals Coordinator to set up your follow up appointment within 24-48 hours of your discharge Monday- Friday: Neurology and Cardiology . We recommend you follow up with your primary care provider within the next 2-3 days, please bring all of your results with you.     Please return to the Emergency Department with new, worsening, or concerning symptoms, such as:  -Shortness of breath or trouble breathing  -Pressure, pain, tightness in chest  -Facial drooping, arm weakness, or speech difficulty   -Head injury or loss of consciousness   -Nonstop bleeding or an open wound     *More detailed information regarding your visit and discharge can be found by reviewing this packet

## 2023-03-18 NOTE — ED CDU PROVIDER INITIAL DAY NOTE - PHYSICAL EXAMINATION
GENERAL: Sitting comfortably in bed in no acute distress  	NEURO: Alert and Oriented to person, place, date and situation. Pupils symmetric, No ptosis. EOMI, No facial asymmetry or dysarthria, no tremor noted. 5/5 strength all 4 limbs.  	HEENT: No conjunctival injection or scleral icterus.   	CARD: Normal rate and regular rhythm, no murmurs and no gallops appreciated.  	RESP: Clear to auscultation bilaterally, No wheezes, rales or rhonchi. Good respiratory effort.  	ABD: Nondistended, Soft and nontender to palpation in all quadrants, no guarding, no rigidity. No masses appreciated.  	EXT: No pedal edema.   SKIN: No visible rashes   PSYCH: Appropriate mood and affect, cooperative GENERAL: Sitting comfortably in bed in no acute distress  	NEURO: Alert and Oriented to person, place, date and situation. Pupils symmetric, No ptosis. EOMI, No facial asymmetry or dysarthria, no tremor noted. 5/5 strength all 4 limbs.  	HEENT: No conjunctival injection or scleral icterus.   	CARD: Normal rate and regular rhythm, no murmurs and no gallops appreciated.  	RESP: Clear to auscultation bilaterally, No wheezes, rales or rhonchi. Good respiratory effort.  	ABD: Nondistended, Soft and nontender to palpation in all quadrants, no guarding, no rigidity. No masses appreciated.  	EXT: No pedal edema. L shoulder no obvious deformity, nontender to palpation.   SKIN: No visible rashes   PSYCH: Appropriate mood and affect, cooperative

## 2023-03-18 NOTE — ED CDU PROVIDER INITIAL DAY NOTE - OBJECTIVE STATEMENT
47-year-old female with past medical history type 2 diabetes presenting with headache and chest pain.  Patient reports she has headaches a few days a week, taking Excedrin with pain relief. This headache started on Wednesday, which may be a little worse than her usual. Denies visual changes, weakness, vomiting. Does not have a neurologist. She also reports that she has been having a few days of intermittent left-sided chest/shoulder pain, not associate with shortness of breath, nausea, diaphoresis. She denies any visual changes, weakness, diarrhea, abdominal pain, urinary complaints. Non-smoker. No established cardiologist, no cardiac workup in the past. Father with history of open heart surgery.    ED course: EKG NSR. Troponin <6. CTs negative. Plan for pain control and CTA coronaries in CDU.

## 2023-03-18 NOTE — CONSULT NOTE ADULT - ATTENDING COMMENTS
Agree with plan as outlined above.  Anginal CP Possible  If CP ->LHC    B Marilin                                                                                            Eighty minutes spent in direct patient care and communication

## 2023-03-18 NOTE — CONSULT NOTE ADULT - SUBJECTIVE AND OBJECTIVE BOX
Patient seen and evaluated at bedside    Reason for consult: CAD    HPI:   Mrs. Pierre is a 47yoF presenting to the ED yesterday with a HA over last couple days.   PMH of DMII  During her evaluation she noted that she has had several episodes of intermittent left sided CP over the last couple days.   Her ECG was NSR without ischemic findings and her Trop T was <6.   As a result she was ordered for a CTa coronaries which is concerning for a severe obstruction of the pLAD.       PMHx:   No pertinent past medical history    DM (diabetes mellitus)    COVID-19 vaccine series completed        PSHx:   H/O  section        Allergies:  No Known Allergies      Home Meds:    Current Medications:   acetaminophen   IVPB .. 1000 milliGRAM(s) IV Intermittent Once PRN  dextrose 5%. 1000 milliLiter(s) IV Continuous <Continuous>  dextrose 5%. 1000 milliLiter(s) IV Continuous <Continuous>  dextrose 50% Injectable 25 Gram(s) IV Push once  dextrose 50% Injectable 12.5 Gram(s) IV Push once  dextrose 50% Injectable 25 Gram(s) IV Push once  dextrose Oral Gel 15 Gram(s) Oral once PRN  glucagon  Injectable 1 milliGRAM(s) IntraMuscular once  insulin lispro (ADMELOG) corrective regimen sliding scale   SubCutaneous three times a day before meals  insulin lispro (ADMELOG) corrective regimen sliding scale   SubCutaneous at bedtime  ketorolac   Injectable 15 milliGRAM(s) IV Push every 6 hours PRN      FAMILY HISTORY:  FH: heart disease (Father)        Social History:  Smoking History:  Alcohol Use:  Drug Use:    Review of Systems:  REVIEW OF SYSTEMS:  CONSTITUTIONAL: No weakness, fevers or chills  EYES/ENT: No visual changes;  No dysphagia  NECK: No pain or stiffness  RESPIRATORY: No cough, wheezing, hemoptysis; No shortness of breath  CARDIOVASCULAR: No chest pain or palpitations; No lower extremity edema  GASTROINTESTINAL: No abdominal or epigastric pain. No nausea, vomiting, or hematemesis; No diarrhea or constipation. No melena or hematochezia.  BACK: No back pain  GENITOURINARY: No dysuria, frequency or hematuria  NEUROLOGICAL: No numbness or weakness  SKIN: No itching, burning, rashes, or lesions   All other review of systems is negative unless indicated above.    [ ] All other systems negative  [ ] Unable to assess ROS due to    Physical Exam:  T(F): 98.5 (03-18), Max: 98.5 (-)  HR: 73 (-) (73 - 101)  BP: 123/81 (-18) (112/70 - 142/83)  RR: 17 (-)  SpO2: 100% (-)  GENERAL: No acute distress, well-developed  HEAD:  Atraumatic, Normocephalic  ENT: EOMI, PERRLA, conjunctiva and sclera clear, Neck supple, No JVD, moist mucosa  CHEST/LUNG: Clear to auscultation bilaterally; No wheeze, equal breath sounds bilaterally   BACK: No spinal tenderness  HEART: Regular rate and rhythm; No murmurs, rubs, or gallops  ABDOMEN: Soft, Nontender, Nondistended; Bowel sounds present  EXTREMITIES:  No clubbing, cyanosis, or edema  PSYCH: Nl behavior, nl affect  NEUROLOGY: AAOx3, non-focal, cranial nerves intact  SKIN: Normal color, No rashes or lesions  LINES:    Cardiovascular Diagnostic Testing:    CXR: Personally reviewed    Labs: Personally reviewed                        10.4   8.80  )-----------( 242      ( 17 Mar 2023 22:51 )             33.7         140  |  104  |  12  ----------------------------<  160<H>  3.6   |  26  |  0.52    Ca    9.8      17 Mar 2023 22:51  Mg     1.7         TPro  7.4  /  Alb  4.5  /  TBili  0.2  /  DBili  x   /  AST  12  /  ALT  11  /  AlkPhos  65          Total Cholesterol: 181  LDL: --  HDL: 42  T       Patient seen and evaluated at bedside    Reason for consult: CAD    HPI:   Mrs. Pierre is a 47yoF presenting to the ED yesterday with a HA over last couple days.   PMH of DMII  During her evaluation she noted that she has had several episodes of intermittent left sided CP over the last couple days.   Her ECG was NSR without ischemic findings and her Trop T was <6.   As a result she was ordered for a CTa coronaries which is concerning for a severe obstruction of the pLAD.       PMHx:   No pertinent past medical history    DM (diabetes mellitus)    COVID-19 vaccine series completed        PSHx:   H/O  section        Allergies:  No Known Allergies      Home Meds:    Current Medications:   acetaminophen   IVPB .. 1000 milliGRAM(s) IV Intermittent Once PRN  dextrose 5%. 1000 milliLiter(s) IV Continuous <Continuous>  dextrose 5%. 1000 milliLiter(s) IV Continuous <Continuous>  dextrose 50% Injectable 25 Gram(s) IV Push once  dextrose 50% Injectable 12.5 Gram(s) IV Push once  dextrose 50% Injectable 25 Gram(s) IV Push once  dextrose Oral Gel 15 Gram(s) Oral once PRN  glucagon  Injectable 1 milliGRAM(s) IntraMuscular once  insulin lispro (ADMELOG) corrective regimen sliding scale   SubCutaneous three times a day before meals  insulin lispro (ADMELOG) corrective regimen sliding scale   SubCutaneous at bedtime  ketorolac   Injectable 15 milliGRAM(s) IV Push every 6 hours PRN      FAMILY HISTORY:  FH: heart disease (Father)        Social History:  Smoking History:  Alcohol Use:  Drug Use:    Review of Systems:  REVIEW OF SYSTEMS:  CONSTITUTIONAL: No weakness, fevers or chills  EYES/ENT: No visual changes;  No dysphagia  NECK: No pain or stiffness  RESPIRATORY: No cough, wheezing, hemoptysis; No shortness of breath  CARDIOVASCULAR: No chest pain or palpitations; No lower extremity edema  GASTROINTESTINAL: No abdominal or epigastric pain. No nausea, vomiting  BACK: No back pain  GENITOURINARY: No dysuria, frequency or hematuria  NEUROLOGICAL: No numbness or weakness  SKIN: No itching, burning, rashes, or lesions   All other review of systems is negative unless indicated above.      Physical Exam:  T(F): 98.5 (03-18), Max: 98.5 (03-18)  HR: 73 (-18) (73 - 101)  BP: 123/81 (-18) (112/70 - 142/83)  RR: 17 (-18)  SpO2: 100% (-)  GENERAL: No acute distress, well-developed  HEAD:  Atraumatic, Normocephalic  ENT: EOMI, PERRLA, conjunctiva and sclera clear, Neck supple, No JVD, moist mucosa  CHEST/LUNG: Clear to auscultation bilaterally; No wheeze, equal breath sounds bilaterally   BACK: No spinal tenderness  HEART: Regular rate and rhythm; No murmurs, rubs, or gallops  ABDOMEN: Soft, Nontender, Nondistended; Bowel sounds present  EXTREMITIES:  No clubbing, cyanosis, or edema  PSYCH: Nl behavior, nl affect  NEUROLOGY: AAOx3, non-focal, cranial nerves intact  SKIN: Normal color, No rashes or lesions      CXR: Personally reviewed    Labs: Personally reviewed                        10.4   8.80  )-----------( 242      ( 17 Mar 2023 22:51 )             33.7         140  |  104  |  12  ----------------------------<  160<H>  3.6   |  26  |  0.52    Ca    9.8      17 Mar 2023 22:51  Mg     1.7         TPro  7.4  /  Alb  4.5  /  TBili  0.2  /  DBili  x   /  AST  12  /  ALT  11  /  AlkPhos  65          Total Cholesterol: 181  LDL: --  HDL: 42  T      ACC: [id number] EXAM:  CT ANGIO HEART CORONARY IC   ORDERED BY: PAUL MARTINEZ     PROCEDURE DATE:  2023          INTERPRETATION:  Indication:  Coronary artery disease    History:  This is a 47-year-old female with history of diabetes who   reports dyspnea of exertion and chest pain.    Scanner:  [facility]    Acquisitions:  1.  Non-contrast prospectively-gated 320-multidtector volumetric computed   tomography heart  2.  Contrast-enhanced prospectively-gated 320-multidetector volumetric   computed tomography heart    Pre-medications:  1.  Metoprolol 30 mg intravenous  2.  Nitroglycerin 0.8 mg sublingual    Contrast:  75 mL Omnipaque 350    Resting heart rate (contrast-enhanced acquisition):  58 beats per minute    Quality:  Fair    Post-processing:  Three-dimensional volume-rendered and multiplanar   reconstructions generated with [employer] software.    FINDINGS:    Cardiovascular:    Coronary arteries:  Coronary artery calcium Agatston score:  Left main (LM)coronary artery:  0  Left anterior descending (LAD) coronary artery:  52  Left circumflex (LCX) coronary artery:  0  Right coronary artery (RCA):  0  Total:  52    Right-dominant coronary circulation.  The LM coronary artery is angiographically normal.    The LAD coronary artery has a complete occlusion in the proximal segment,    mid and distal segments are filled with flow coming from the PDA.  The diagonal branch is angiographically normal.    The Ramus intermedius (RI) is angiographically normal.    The LCX coronary artery is angiographically normal.  The obtuse marginal (OM) branch is angiographically normal.    The RCA is angiographically normal.  The right posterior descending artery (PDA) and the right posterolateral   (RPL) branch are angiographically normal.    Aorta:  No thoracic aortic dissection noted in the visualized thoracic aorta.    Pericardium:  There is no pericardial effusion.    Chambers:  The left atrial chamber is qualitatively large in size.  There is an small diverticulum in the left atria    Non-cardiac: There is a 5 mm subpleural nodule in the middle lobe   ().      IMPRESSION:  1.  Severe obstructive CAD is noted in pLAD.  2.  The calculated Agatston score is 52. The estimated probability of a   non zero calcium score is 7%.The observed calcium score is at percentile   99 for subjects of the same age, gender, and race/ethnicity who are free   of clinical cardiovascular disease and treated diabetes.  3. Right middle lobe 5 mm nodule. Consider follow-up in one year if this   patient is at increased risk for lung cancer.    Findings communicated with CDU PA on call at 02:25pm    --- End of Report ---          DHRUV NUNEZ MD; Attending Cardiologist  This document has been electronically signed.  MIRA BOYER M.D., ATTENDING RADIOLOGIST  This document has been electronically signed. Mar 18 2023  2:42PM     Patient seen and evaluated at bedside    Reason for consult: CAD    HPI:   Mrs. Pierre is a 47yoF presenting to the ED yesterday with a HA over last couple days.   PMH of DMII  During her evaluation she noted that she has had several episodes of intermittent left sided CP over the last couple days. States that it is exertional in nature and radiates to her left arm. States that she can walk about 1/2block before she starts to have symptoms. Prior to this she reports that she had a couple short lived episodes of [place] but her ECG's were normal so no further evaluation was done. She denies LE edema, orthopnea, PND.   Her ECG was NSR without ischemic findings and her Trop T was <6.   As a result she was ordered for a CTa coronaries which is concerning for a severe obstruction of the pLAD.       PMHx:   No pertinent past medical history    DM (diabetes mellitus)    COVID-19 vaccine series completed        PSHx:   H/O  section        Allergies:  No Known Allergies      Home Meds:    Current Medications:   acetaminophen   IVPB .. 1000 milliGRAM(s) IV Intermittent Once PRN  dextrose 5%. 1000 milliLiter(s) IV Continuous <Continuous>  dextrose 5%. 1000 milliLiter(s) IV Continuous <Continuous>  dextrose 50% Injectable 25 Gram(s) IV Push once  dextrose 50% Injectable 12.5 Gram(s) IV Push once  dextrose 50% Injectable 25 Gram(s) IV Push once  dextrose Oral Gel 15 Gram(s) Oral once PRN  glucagon  Injectable 1 milliGRAM(s) IntraMuscular once  insulin lispro (ADMELOG) corrective regimen sliding scale   SubCutaneous three times a day before meals  insulin lispro (ADMELOG) corrective regimen sliding scale   SubCutaneous at bedtime  ketorolac   Injectable 15 milliGRAM(s) IV Push every 6 hours PRN      FAMILY HISTORY:  FH: heart disease (Father), CABG in         Social History:  Smoking History: No  Alcohol Use: occasionally  Drug Use: No     Review of Systems: Currently asymptomatic.     CONSTITUTIONAL: No weakness, fevers or chills  EYES/ENT: No visual changes;  No dysphagia  NECK: No pain or stiffness  RESPIRATORY: No cough, wheezing, hemoptysis; No shortness of breath  CARDIOVASCULAR: No chest pain or palpitations; No lower extremity edema  GASTROINTESTINAL: No abdominal or epigastric pain. No nausea, vomiting  BACK: No back pain  GENITOURINARY: No dysuria, frequency or hematuria  NEUROLOGICAL: No numbness or weakness  SKIN: No itching, burning, rashes, or lesions   All other review of systems is negative unless indicated above.      Physical Exam:  T(F): 98.5 (-), Max: 98.5 ()  HR: 73 () (73 - 101)  BP: 123/81 () (112/70 - 142/83)  RR: 17 ()  SpO2: 100% ()  GENERAL: No acute distress, well-developed  HEAD:  Atraumatic, Normocephalic  ENT: EOMI, PERRLA, conjunctiva and sclera clear, Neck supple, No JVD, moist mucosa  CHEST/LUNG: Clear to auscultation bilaterally; No wheeze, equal breath sounds bilaterally   BACK: No spinal tenderness  HEART: Regular rate and rhythm; No murmurs, rubs, or gallops  ABDOMEN: Soft, Nontender, Nondistended; Bowel sounds present  EXTREMITIES:  No clubbing, cyanosis, or edema  PSYCH: Nl behavior, nl affect  NEUROLOGY: AAOx3, non-focal, cranial nerves intact  SKIN: Normal color, No rashes or lesions      CXR: Personally reviewed    Labs: Personally reviewed                        10.4   8.80  )-----------( 242      ( 17 Mar 2023 22:51 )             33.7         140  |  104  |  12  ----------------------------<  160<H>  3.6   |  26  |  0.52    Ca    9.8      17 Mar 2023 22:51  Mg     1.7         TPro  7.4  /  Alb  4.5  /  TBili  0.2  /  DBili  x   /  AST  12  /  ALT  11  /  AlkPhos  65          Total Cholesterol: 181  LDL: --  HDL: 42  T      ACC: [id number] EXAM:  CT ANGIO HEART CORONARY IC   ORDERED BY: PAUL MARTINEZ     PROCEDURE DATE:  2023          INTERPRETATION:  Indication:  Coronary artery disease    History:  This is a 47-year-old female with history of diabetes who   reports dyspnea of exertion and chest pain.    Scanner:  [facility]    Acquisitions:  1.  Non-contrast prospectively-gated 320-multidtector volumetric computed   tomography heart  2.  Contrast-enhanced prospectively-gated 320-multidetector volumetric   computed tomography heart    Pre-medications:  1.  Metoprolol 30 mg intravenous  2.  Nitroglycerin 0.8 mg sublingual    Contrast:  75 mL Omnipaque 350    Resting heart rate (contrast-enhanced acquisition):  58 beats per minute    Quality:  Fair    Post-processing:  Three-dimensional volume-rendered and multiplanar   reconstructions generated with [employer] software.    FINDINGS:    Cardiovascular:    Coronary arteries:  Coronary artery calcium Agatston score:  Left main (LM)coronary artery:  0  Left anterior descending (LAD) coronary artery:  52  Left circumflex (LCX) coronary artery:  0  Right coronary artery (RCA):  0  Total:  52    Right-dominant coronary circulation.  The LM coronary artery is angiographically normal.    The LAD coronary artery has a complete occlusion in the proximal segment,    mid and distal segments are filled with flow coming from the PDA.  The diagonal branch is angiographically normal.    The Ramus intermedius (RI) is angiographically normal.    The LCX coronary artery is angiographically normal.  The obtuse marginal (OM) branch is angiographically normal.    The RCA is angiographically normal.  The right posterior descending artery (PDA) and the right posterolateral   (RPL) branch are angiographically normal.    Aorta:  No thoracic aortic dissection noted in the visualized thoracic aorta.    Pericardium:  There is no pericardial effusion.    Chambers:  The left atrial chamber is qualitatively large in size.  There is an small diverticulum in the left atria    Non-cardiac: There is a 5 mm subpleural nodule in the middle lobe   ().      IMPRESSION:  1.  Severe obstructive CAD is noted in pLAD.  2.  The calculated Agatston score is 52. The estimated probability of a   non zero calcium score is 7%.The observed calcium score is at percentile   99 for subjects of the same age, gender, and race/ethnicity who are free   of clinical cardiovascular disease and treated diabetes.  3. Right middle lobe 5 mm nodule. Consider follow-up in one year if this   patient is at increased risk for lung cancer.    Findings communicated with CDU PA on call at 02:25pm    --- End of Report ---          DHRUV NUNEZ MD; Attending Cardiologist  This document has been electronically signed.  MIRA BOYER M.D., ATTENDING RADIOLOGIST  This document has been electronically signed. Mar 18 2023  2:42PM

## 2023-03-18 NOTE — H&P ADULT - HISTORY OF PRESENT ILLNESS
47yoF presenting to the ED yesterday with a HA over last couple days.   During her evaluation she noted that she has had several episodes of intermittent left sided CP over the last couple days. States that it is exertional in nature and radiates to her left arm. States that she can walk about 1/2block before she starts to have symptoms. Prior to this she reports that she had a couple short lived episodes of [place] but her ECG's were normal so no further evaluation was done. She denies LE edema, orthopnea, PND.   Her ECG was NSR without ischemic findings and her Trop T was <6.   As a result she was ordered for a CTa coronaries which is concerning for a severe obstruction of the pLAD.

## 2023-03-18 NOTE — H&P ADULT - NSHPLABSRESULTS_GEN_ALL_CORE
10.4   8.80  )-----------( 242      ( 17 Mar 2023 22:51 )             33.7       03-17    140  |  104  |  12  ----------------------------<  160<H>  3.6   |  26  |  0.52    Ca    9.8      17 Mar 2023 22:51  Mg     1.7     03-17    TPro  7.4  /  Alb  4.5  /  TBili  0.2  /  DBili  x   /  AST  12  /  ALT  11  /  AlkPhos  65  03-17          < from: CT Angio Heart and Coronaries w/ IV Cont (03.18.23 @ 12:19) >    IMPRESSION:  1.  Severe obstructive CAD is noted in pLAD.  2.  The calculated Agatston score is 52. The estimated probability of a   non zero calcium score is 7%.The observed calcium score is at percentile   99 for subjects of the same age, gender, and race/ethnicity who are free   of clinical cardiovascular disease and treated diabetes.  3. Right middle lobe 5 mm nodule. Consider follow-up in one year if this   patient is at increased risk for lung cancer.    < end of copied text >    < from: CT Head No Cont (03.17.23 @ 23:18) >    IMPRESSION:    No acute intracranial abnormalities.    No evidence sinusitis.    < end of copied text >

## 2023-03-18 NOTE — PATIENT PROFILE ADULT - FALL HARM RISK - UNIVERSAL INTERVENTIONS
Bed in lowest position, wheels locked, appropriate side rails in place/Call bell, personal items and telephone in reach/Instruct patient to call for assistance before getting out of bed or chair/Non-slip footwear when patient is out of bed/[place] to call system/Physically safe environment - no spills, clutter or unnecessary equipment/Purposeful Proactive Rounding/Room/bathroom lighting operational, light cord in reach

## 2023-03-18 NOTE — ED ADULT NURSE REASSESSMENT NOTE - NS ED NURSE REASSESS COMMENT FT1
pt arrived via wc to CDU from [facility] after receiving report from Stella GUY RN; pt is alert and oriented; skin warm and dry; placed on bedside cardiac monitor upon  arrival; pt has no pain at this time; #20 SL in tact to  ac; pt oriented to room, unit, location of bathroom,  use of call bell and plan of care; pt had half sandwich then ambulated to bathroom to void.
report given to SALMA Bear' via Henrik in [place]
Received pt from SALMA Hanson, pt aox4, comfort and safety maintained, IV catheter in place with no signs or symptoms of pain or irritation, bed in lowest position with call bell in place, Vital signs stable, on cardiac monitor with NSR, Pt in CDU for chest pain for 2 days, pt denies any chest pain this am. Pending a CTA, FS monitored, will continue with the plan of care.

## 2023-03-18 NOTE — CONSULT NOTE ADULT - ASSESSMENT
Mrs. Pierre is a 47yoF presenting with intermittent CP should to have significant obstruction in the prox LAD of CTa coronaries.     #CAD  Intermittent CP leading up to admission with normal ECG and cardiac enzymes.   CTa concerning for significant pLAD obstruction.     Recommendations:  -Tele  -Strict Lytes: K:4-5, M-3  -TTE  -Atorvastatin 80mg  -Metop 25mg XL  -ASA 81mg  -check Lipids/TSH/HgbA1c  -Plan for C possibly tomorrow.   -NPO MN, have Covid test within last 5d resulted   -Avoid NSAID use     Note incomplete until attending cosign Mrs. Pierre is a 47yoF presenting with intermittent CP should to have significant obstruction in the prox LAD of CTa coronaries.     #CAD  Intermittent CP leading up to admission with normal ECG and cardiac enzymes.   CTa concerning for significant pLAD obstruction with collaterals filling the distal LAD concerning for . No history/PE features of heart failure.     Recommendations:  -Tele  -Strict Lytes: K:4-5, M-3  -TTE in am early to help guide cath  -Atorvastatin 80mg  -Metop 25mg XL  -ASA 81mg  -check Lipids/TSH/HgbA1c  -Plan for C possibly tomorrow.   -NPO MN, have Covid test within last 5d resulted   -Avoid NSAID use     Note incomplete until attending cosign

## 2023-03-19 LAB
ANION GAP SERPL CALC-SCNC: 11 MMOL/L — SIGNIFICANT CHANGE UP (ref 5–17)
BUN SERPL-MCNC: 13 MG/DL — SIGNIFICANT CHANGE UP (ref 7–23)
CALCIUM SERPL-MCNC: 8.9 MG/DL — SIGNIFICANT CHANGE UP (ref 8.4–10.5)
CHLORIDE SERPL-SCNC: 104 MMOL/L — SIGNIFICANT CHANGE UP (ref 96–108)
CO2 SERPL-SCNC: 23 MMOL/L — SIGNIFICANT CHANGE UP (ref 22–31)
CREAT SERPL-MCNC: 0.47 MG/DL — LOW (ref 0.5–1.3)
EGFR: 120 ML/MIN/1.73M2 — SIGNIFICANT CHANGE UP
GLUCOSE BLDC GLUCOMTR-MCNC: 138 MG/DL — HIGH (ref 70–99)
GLUCOSE BLDC GLUCOMTR-MCNC: 165 MG/DL — HIGH (ref 70–99)
GLUCOSE BLDC GLUCOMTR-MCNC: 176 MG/DL — HIGH (ref 70–99)
GLUCOSE BLDC GLUCOMTR-MCNC: 183 MG/DL — HIGH (ref 70–99)
GLUCOSE SERPL-MCNC: 269 MG/DL — HIGH (ref 70–99)
HCT VFR BLD CALC: 35.9 % — SIGNIFICANT CHANGE UP (ref 34.5–45)
HGB BLD-MCNC: 11 G/DL — LOW (ref 11.5–15.5)
MAGNESIUM SERPL-MCNC: 2.1 MG/DL — SIGNIFICANT CHANGE UP (ref 1.6–2.6)
MCHC RBC-ENTMCNC: 26.8 PG — LOW (ref 27–34)
MCHC RBC-ENTMCNC: 30.6 GM/DL — LOW (ref 32–36)
MCV RBC AUTO: 87.6 FL — SIGNIFICANT CHANGE UP (ref 80–100)
NRBC # BLD: 0 /100 WBCS — SIGNIFICANT CHANGE UP (ref 0–0)
PLATELET # BLD AUTO: 241 K/UL — SIGNIFICANT CHANGE UP (ref 150–400)
POTASSIUM SERPL-MCNC: 4 MMOL/L — SIGNIFICANT CHANGE UP (ref 3.5–5.3)
POTASSIUM SERPL-SCNC: 4 MMOL/L — SIGNIFICANT CHANGE UP (ref 3.5–5.3)
RBC # BLD: 4.1 M/UL — SIGNIFICANT CHANGE UP (ref 3.8–5.2)
RBC # FLD: 13.5 % — SIGNIFICANT CHANGE UP (ref 10.3–14.5)
SODIUM SERPL-SCNC: 138 MMOL/L — SIGNIFICANT CHANGE UP (ref 135–145)
WBC # BLD: 8.07 K/UL — SIGNIFICANT CHANGE UP (ref 3.8–10.5)
WBC # FLD AUTO: 8.07 K/UL — SIGNIFICANT CHANGE UP (ref 3.8–10.5)

## 2023-03-19 PROCEDURE — 99233 SBSQ HOSP IP/OBS HIGH 50: CPT

## 2023-03-19 PROCEDURE — 99152 MOD SED SAME PHYS/QHP 5/>YRS: CPT

## 2023-03-19 PROCEDURE — 93458 L HRT ARTERY/VENTRICLE ANGIO: CPT | Mod: 26

## 2023-03-19 RX ORDER — SODIUM CHLORIDE 9 MG/ML
250 INJECTION INTRAMUSCULAR; INTRAVENOUS; SUBCUTANEOUS ONCE
Refills: 0 | Status: COMPLETED | OUTPATIENT
Start: 2023-03-19 | End: 2023-03-19

## 2023-03-19 RX ORDER — ACETAMINOPHEN 500 MG
650 TABLET ORAL ONCE
Refills: 0 | Status: COMPLETED | OUTPATIENT
Start: 2023-03-19 | End: 2023-03-19

## 2023-03-19 RX ORDER — METOPROLOL TARTRATE 50 MG
50 TABLET ORAL
Refills: 0 | Status: DISCONTINUED | OUTPATIENT
Start: 2023-03-19 | End: 2023-03-28

## 2023-03-19 RX ORDER — INFLUENZA VIRUS VACCINE 15; 15; 15; 15 UG/.5ML; UG/.5ML; UG/.5ML; UG/.5ML
0.5 SUSPENSION INTRAMUSCULAR ONCE
Refills: 0 | Status: DISCONTINUED | OUTPATIENT
Start: 2023-03-19 | End: 2023-03-28

## 2023-03-19 RX ORDER — SODIUM CHLORIDE 9 MG/ML
1000 INJECTION INTRAMUSCULAR; INTRAVENOUS; SUBCUTANEOUS
Refills: 0 | Status: DISCONTINUED | OUTPATIENT
Start: 2023-03-19 | End: 2023-03-29

## 2023-03-19 RX ORDER — BNT162B2 ORIGINAL AND OMICRON BA.4/BA.5 .1125; .1125 MG/2.25ML; MG/2.25ML
0.3 INJECTION, SUSPENSION INTRAMUSCULAR ONCE
Refills: 0 | Status: DISCONTINUED | OUTPATIENT
Start: 2023-03-19 | End: 2023-03-28

## 2023-03-19 RX ADMIN — Medication 50 MILLIGRAM(S): at 18:02

## 2023-03-19 RX ADMIN — ATORVASTATIN CALCIUM 80 MILLIGRAM(S): 80 TABLET, FILM COATED ORAL at 21:09

## 2023-03-19 RX ADMIN — Medication 1: at 17:23

## 2023-03-19 RX ADMIN — Medication 25 MILLIGRAM(S): at 05:37

## 2023-03-19 RX ADMIN — Medication 1: at 13:10

## 2023-03-19 RX ADMIN — Medication 81 MILLIGRAM(S): at 10:16

## 2023-03-19 RX ADMIN — Medication 1: at 08:29

## 2023-03-19 RX ADMIN — Medication 650 MILLIGRAM(S): at 09:30

## 2023-03-19 RX ADMIN — Medication 5 MILLIGRAM(S): at 21:09

## 2023-03-19 RX ADMIN — Medication 650 MILLIGRAM(S): at 10:05

## 2023-03-19 NOTE — PROGRESS NOTE ADULT - SUBJECTIVE AND OBJECTIVE BOX
HPI:   Mrs. Pierre is a 47yoF presenting to the ED yesterday with a HA over last couple days.   PMH of DMII  During her evaluation she noted that she has had several episodes of intermittent left sided CP over the last couple days. States that it is exertional in nature and radiates to her left arm. States that she can walk about 1/2block before she starts to have symptoms. Prior to this she reports that she had a couple short lived episodes of [place] but her ECG's were normal so no further evaluation was done. She denies LE edema, orthopnea, PND.   Her ECG was NSR without ischemic findings and her Trop T was <6.   As a result she was ordered for a CTa coronaries which is concerning for a severe obstruction of the pLAD.     ===========================  Interval Events  - CP this AM -> LHC: Ostial   ===========================      PMHx:   No pertinent past medical history    DM (diabetes mellitus)    COVID-19 vaccine series completed        PSHx:   H/O  section        Allergies:  No Known Allergies      Home Meds:    Current Medications:   acetaminophen   IVPB .. 1000 milliGRAM(s) IV Intermittent Once PRN  dextrose 5%. 1000 milliLiter(s) IV Continuous <Continuous>  dextrose 5%. 1000 milliLiter(s) IV Continuous <Continuous>  dextrose 50% Injectable 25 Gram(s) IV Push once  dextrose 50% Injectable 12.5 Gram(s) IV Push once  dextrose 50% Injectable 25 Gram(s) IV Push once  dextrose Oral Gel 15 Gram(s) Oral once PRN  glucagon  Injectable 1 milliGRAM(s) IntraMuscular once  insulin lispro (ADMELOG) corrective regimen sliding scale   SubCutaneous three times a day before meals  insulin lispro (ADMELOG) corrective regimen sliding scale   SubCutaneous at bedtime  ketorolac   Injectable 15 milliGRAM(s) IV Push every 6 hours PRN      FAMILY HISTORY:  FH: heart disease (Father), CABG in         Social History:  Smoking History: No  Alcohol Use: occasionally  Drug Use: No     Review of Systems: Currently asymptomatic.     CONSTITUTIONAL: No weakness, fevers or chills  EYES/ENT: No visual changes;  No dysphagia  NECK: No pain or stiffness  RESPIRATORY: No cough, wheezing, hemoptysis; No shortness of breath  CARDIOVASCULAR: No chest pain or palpitations; No lower extremity edema  GASTROINTESTINAL: No abdominal or epigastric pain. No nausea, vomiting  BACK: No back pain  GENITOURINARY: No dysuria, frequency or hematuria  NEUROLOGICAL: No numbness or weakness  SKIN: No itching, burning, rashes, or lesions   All other review of systems is negative unless indicated above.      Physical Exam:  T(F): 98.5 (-), Max: 98.5 ()  HR: 73 () (73 - 101)  BP: 123/81 () (112/70 - 142/83)  RR: 17 ()  SpO2: 100% ()  GENERAL: No acute distress, well-developed  HEAD:  Atraumatic, Normocephalic  ENT: EOMI, PERRLA, conjunctiva and sclera clear, Neck supple, No JVD, moist mucosa  CHEST/LUNG: Clear to auscultation bilaterally; No wheeze, equal breath sounds bilaterally   BACK: No spinal tenderness  HEART: Regular rate and rhythm; No murmurs, rubs, or gallops  ABDOMEN: Soft, Nontender, Nondistended; Bowel sounds present  EXTREMITIES:  No clubbing, cyanosis, or edema  PSYCH: Nl behavior, nl affect  NEUROLOGY: AAOx3, non-focal, cranial nerves intact  SKIN: Normal color, No rashes or lesions      CXR: Personally reviewed    Labs: Personally reviewed                        10.4   8.80  )-----------( 242      ( 17 Mar 2023 22:51 )             33.7         140  |  104  |  12  ----------------------------<  160<H>  3.6   |  26  |  0.52    Ca    9.8      17 Mar 2023 22:51  Mg     1.7         TPro  7.4  /  Alb  4.5  /  TBili  0.2  /  DBili  x   /  AST  12  /  ALT  11  /  AlkPhos  65          Total Cholesterol: 181  LDL: --  HDL: 42  T      ACC: [id number] EXAM:  CT ANGIO HEART CORONARY IC   ORDERED BY: PAUL MARTINEZ     PROCEDURE DATE:  2023          INTERPRETATION:  Indication:  Coronary artery disease    History:  This is a 47-year-old female with history of diabetes who   reports dyspnea of exertion and chest pain.    Scanner:  [facility] [employer]    Acquisitions:  1.  Non-contrast prospectively-gated 320-multidtector volumetric computed   tomography heart  2.  Contrast-enhanced prospectively-gated 320-multidetector volumetric   computed tomography heart    Pre-medications:  1.  Metoprolol 30 mg intravenous  2.  Nitroglycerin 0.8 mg sublingual    Contrast:  75 mL Omnipaque 350    Resting heart rate (contrast-enhanced acquisition):  58 beats per minute    Quality:  Fair    Post-processing:  Three-dimensional volume-rendered and multiplanar   reconstructions generated with Vitrea software.    FINDINGS:    Cardiovascular:    Coronary arteries:  Coronary artery calcium Agatston score:  Left main (LM)coronary artery:  0  Left anterior descending (LAD) coronary artery:  52  Left circumflex (LCX) coronary artery:  0  Right coronary artery (RCA):  0  Total:  52    Right-dominant coronary circulation.  The LM coronary artery is angiographically normal.    The LAD coronary artery has a complete occlusion in the proximal segment,    mid and distal segments are filled with flow coming from the PDA.  The diagonal branch is angiographically normal.    The Ramus intermedius (RI) is angiographically normal.    The LCX coronary artery is angiographically normal.  The obtuse marginal (OM) branch is angiographically normal.    The RCA is angiographically normal.  The right posterior descending artery (PDA) and the right posterolateral   (RPL) branch are angiographically normal.    Aorta:  No thoracic aortic dissection noted in the visualized thoracic aorta.    Pericardium:  There is no pericardial effusion.    Chambers:  The left atrial chamber is qualitatively large in size.  There is an small diverticulum in the left atria    Non-cardiac: There is a 5 mm subpleural nodule in the middle lobe   ().      IMPRESSION:  1.  Severe obstructive CAD is noted in pLAD.  2.  The calculated Agatston score is 52. The estimated probability of a   non zero calcium score is 7%.The observed calcium score is at percentile   99 for subjects of the same age, gender, and race/ethnicity who are free   of clinical cardiovascular disease and treated diabetes.  3. Right middle lobe 5 mm nodule. Consider follow-up in one year if this   patient is at increased risk for lung cancer.    Findings communicated with CDU PA on call at 02:25pm    --- End of Report ---          DHRUV NUNEZ MD; Attending Cardiologist  This document has been electronically signed.  MIRA BOYER M.D., ATTENDING RADIOLOGIST  This document has been electronically signed. Mar 18 2023  2:42PM        Assessment and Recommendation:   · Assessment	  Mrs. Pierre is a 47yoF presenting with intermittent CP should to have significant obstruction in the prox LAD of CTa coronaries.     #CAD  Intermittent CP leading up to admission with normal ECG and cardiac enzymes.   CTa concerning for significant pLAD obstruction with collaterals filling the distal LAD concerning for . No history/PE features of heart failure. Now [facility] with Ostial  -> too high risk for percutaneous    Recommendations:  -Increased Metoprolol to 50 Q 12   -Bedside NTG  -*Consult CTS re: Robotic LIMA>LAD*  -Tele  -Strict Lytes: K:4-5, M-3  -Atorvastatin 80mg  -Given young age, pls make sure she follows with me as outpatient   -ASA 81mg    B Marilin                                                                                                Fourty minutes spent in direct patient care and communication

## 2023-03-19 NOTE — PROVIDER CONTACT NOTE (OTHER) - ASSESSMENT
Patient is A&Ox4, no distress, no SOB, VSS see in flow sheets. Patient obtained IVPB Magnesium Sulfate over night.
Patient is A&Ox4, complaining of 8/10 headache, no SOB, VSS, see in flow sheet.
Patient is A&Ox4, no distress, sleeping comfortably.

## 2023-03-19 NOTE — PROVIDER CONTACT NOTE (OTHER) - ACTION/TREATMENT ORDERED:
Do not administer 2nd dose of IV magnesium. Take morning labs. Do not administer 2nd dose of IVPB Magnesium Sulfate. Take morning labs.

## 2023-03-19 NOTE — PROGRESS NOTE ADULT - ASSESSMENT
47 f with    CAD/ Chest pain/ Abnormal CT coronaries   - telemetry  - ASA  - BB  - Cardiology evaluation   - cath high risk as per cardiology  - CT Surgery evaluation pending     Diabetes Mellitus  - BS control  - ADA diet     DVT prophylaxis  - low risk     d/w patient and      Jan Mitchell MD phone [phone]

## 2023-03-19 NOTE — PROVIDER CONTACT NOTE (OTHER) - BACKGROUND
Patient admitted for migraine without status migrainosus, no intractable.
Patient admitted for migraine without status migrainosus, not intractable.
Patient admitted for migraine without status migrainosus, not intractable.

## 2023-03-19 NOTE — PROGRESS NOTE ADULT - SUBJECTIVE AND OBJECTIVE BOX
Patient is a 47y old  Female who presents with a chief complaint of CAD (18 Mar 2023 16:08)      SUBJECTIVE / OVERNIGHT EVENTS: No new complaints.  at bedside.  Review of Systems  chest pain no  palpitations no  sob no  nausea no  headache no    MEDICATIONS  (STANDING):  aspirin enteric coated 81 milliGRAM(s) Oral daily  atorvastatin 80 milliGRAM(s) Oral at bedtime  coronavirus bivalent (EUA) Booster Vaccine (PFIZER) 0.3 milliLiter(s) IntraMuscular once  dextrose 5%. 1000 milliLiter(s) (100 mL/Hr) IV Continuous <Continuous>  dextrose 5%. 1000 milliLiter(s) (50 mL/Hr) IV Continuous <Continuous>  dextrose 50% Injectable 25 Gram(s) IV Push once  dextrose 50% Injectable 25 Gram(s) IV Push once  dextrose 50% Injectable 12.5 Gram(s) IV Push once  glucagon  Injectable 1 milliGRAM(s) IntraMuscular once  influenza   Vaccine 0.5 milliLiter(s) IntraMuscular once  insulin lispro (ADMELOG) corrective regimen sliding scale   SubCutaneous three times a day before meals  insulin lispro (ADMELOG) corrective regimen sliding scale   SubCutaneous at bedtime  metoprolol succinate ER 25 milliGRAM(s) Oral daily  sodium chloride 0.9% Bolus 250 milliLiter(s) IV Bolus once  sodium chloride 0.9%. 1000 milliLiter(s) (75 mL/Hr) IV Continuous <Continuous>    MEDICATIONS  (PRN):  dextrose Oral Gel 15 Gram(s) Oral once PRN Blood Glucose LESS THAN 70 milliGRAM(s)/deciliter  melatonin 5 milliGRAM(s) Oral at bedtime PRN Insomnia      Vital Signs Last 24 Hrs  T(C): 36.7 (19 Mar 2023 12:51), Max: 37.2 (18 Mar 2023 17:04)  T(F): 98 (19 Mar 2023 12:51), Max: 98.9 (18 Mar 2023 17:04)  HR: 79 (19 Mar 2023 12:51) (78 - 91)  BP: 139/85 (19 Mar 2023 12:51) (98/62 - 149/86)  BP(mean): --  RR: 18 (19 Mar 2023 12:51) (14 - 20)  SpO2: 100% (19 Mar 2023 12:51) (95% - 100%)    Parameters below as of 19 Mar 2023 12:51  Patient On (Oxygen Delivery Method): room air        PHYSICAL EXAM:  GENERAL: NAD, well-developed  HEAD:  Atraumatic, Normocephalic  EYES: EOMI, PERRLA, conjunctiva and sclera clear  NECK: Supple, No JVD  CHEST/LUNG: Clear to auscultation bilaterally; No wheeze  HEART: Regular rate and rhythm; No murmurs, rubs, or gallops  ABDOMEN: Soft, Nontender, Nondistended; Bowel sounds present  EXTREMITIES:  2+ Peripheral Pulses, No clubbing, cyanosis, or edema  PSYCH: AAOx3  NEUROLOGY: non-focal  SKIN: No rashes or lesions    LABS:                        11.0   8.07  )-----------( 241      ( 19 Mar 2023 14:32 )             35.9     03-19    138  |  104  |  13  ----------------------------<  269<H>  4.0   |  23  |  0.47<L>    Ca    8.9      19 Mar 2023 14:32  Mg     2.1     03-19    TPro  7.4  /  Alb  4.5  /  TBili  0.2  /  DBili  x   /  AST  12  /  ALT  11  /  AlkPhos  65  03-17                RADIOLOGY & ADDITIONAL TESTS:    Imaging Personally Reviewed:    Consultant(s) Notes Reviewed:      Care Discussed with Consultants/Other Providers:

## 2023-03-20 DIAGNOSIS — I25.10 ATHEROSCLEROTIC HEART DISEASE OF NATIVE CORONARY ARTERY WITHOUT ANGINA PECTORIS: ICD-10-CM

## 2023-03-20 DIAGNOSIS — E11.9 TYPE 2 DIABETES MELLITUS WITHOUT COMPLICATIONS: ICD-10-CM

## 2023-03-20 LAB
ANION GAP SERPL CALC-SCNC: 9 MMOL/L — SIGNIFICANT CHANGE UP (ref 5–17)
APTT BLD: 30.4 SEC — SIGNIFICANT CHANGE UP (ref 27.5–35.5)
BLD GP AB SCN SERPL QL: NEGATIVE — SIGNIFICANT CHANGE UP
BUN SERPL-MCNC: 14 MG/DL — SIGNIFICANT CHANGE UP (ref 7–23)
CALCIUM SERPL-MCNC: 9.1 MG/DL — SIGNIFICANT CHANGE UP (ref 8.4–10.5)
CHLORIDE SERPL-SCNC: 105 MMOL/L — SIGNIFICANT CHANGE UP (ref 96–108)
CO2 SERPL-SCNC: 23 MMOL/L — SIGNIFICANT CHANGE UP (ref 22–31)
CREAT SERPL-MCNC: 0.51 MG/DL — SIGNIFICANT CHANGE UP (ref 0.5–1.3)
EGFR: 117 ML/MIN/1.73M2 — SIGNIFICANT CHANGE UP
GLUCOSE BLDC GLUCOMTR-MCNC: 155 MG/DL — HIGH (ref 70–99)
GLUCOSE BLDC GLUCOMTR-MCNC: 165 MG/DL — HIGH (ref 70–99)
GLUCOSE BLDC GLUCOMTR-MCNC: 228 MG/DL — HIGH (ref 70–99)
GLUCOSE SERPL-MCNC: 170 MG/DL — HIGH (ref 70–99)
HCT VFR BLD CALC: 33 % — LOW (ref 34.5–45)
HGB BLD-MCNC: 10 G/DL — LOW (ref 11.5–15.5)
INR BLD: 1.3 RATIO — HIGH (ref 0.88–1.16)
MAGNESIUM SERPL-MCNC: 1.9 MG/DL — SIGNIFICANT CHANGE UP (ref 1.6–2.6)
MCHC RBC-ENTMCNC: 26.5 PG — LOW (ref 27–34)
MCHC RBC-ENTMCNC: 30.3 GM/DL — LOW (ref 32–36)
MCV RBC AUTO: 87.5 FL — SIGNIFICANT CHANGE UP (ref 80–100)
NRBC # BLD: 0 /100 WBCS — SIGNIFICANT CHANGE UP (ref 0–0)
NT-PROBNP SERPL-SCNC: <36 PG/ML — SIGNIFICANT CHANGE UP (ref 0–300)
PA ADP PRP-ACNC: 302 PRU — SIGNIFICANT CHANGE UP (ref 194–417)
PLATELET # BLD AUTO: 234 K/UL — SIGNIFICANT CHANGE UP (ref 150–400)
POTASSIUM SERPL-MCNC: 4.1 MMOL/L — SIGNIFICANT CHANGE UP (ref 3.5–5.3)
POTASSIUM SERPL-SCNC: 4.1 MMOL/L — SIGNIFICANT CHANGE UP (ref 3.5–5.3)
PROTHROM AB SERPL-ACNC: 15 SEC — HIGH (ref 10.5–13.4)
RBC # BLD: 3.77 M/UL — LOW (ref 3.8–5.2)
RBC # FLD: 13.5 % — SIGNIFICANT CHANGE UP (ref 10.3–14.5)
RH IG SCN BLD-IMP: POSITIVE — SIGNIFICANT CHANGE UP
SODIUM SERPL-SCNC: 137 MMOL/L — SIGNIFICANT CHANGE UP (ref 135–145)
WBC # BLD: 7.32 K/UL — SIGNIFICANT CHANGE UP (ref 3.8–10.5)
WBC # FLD AUTO: 7.32 K/UL — SIGNIFICANT CHANGE UP (ref 3.8–10.5)

## 2023-03-20 PROCEDURE — 93356 MYOCRD STRAIN IMG SPCKL TRCK: CPT

## 2023-03-20 PROCEDURE — 93306 TTE W/DOPPLER COMPLETE: CPT | Mod: 26

## 2023-03-20 PROCEDURE — 99253 IP/OBS CNSLTJ NEW/EST LOW 45: CPT

## 2023-03-20 PROCEDURE — 99233 SBSQ HOSP IP/OBS HIGH 50: CPT

## 2023-03-20 PROCEDURE — 99221 1ST HOSP IP/OBS SF/LOW 40: CPT

## 2023-03-20 PROCEDURE — 93880 EXTRACRANIAL BILAT STUDY: CPT | Mod: 26

## 2023-03-20 RX ORDER — ACETAMINOPHEN 500 MG
650 TABLET ORAL EVERY 6 HOURS
Refills: 0 | Status: DISCONTINUED | OUTPATIENT
Start: 2023-03-20 | End: 2023-03-28

## 2023-03-20 RX ADMIN — Medication 1: at 08:16

## 2023-03-20 RX ADMIN — Medication 650 MILLIGRAM(S): at 21:37

## 2023-03-20 RX ADMIN — Medication 2: at 17:34

## 2023-03-20 RX ADMIN — ATORVASTATIN CALCIUM 80 MILLIGRAM(S): 80 TABLET, FILM COATED ORAL at 21:38

## 2023-03-20 RX ADMIN — Medication 650 MILLIGRAM(S): at 22:36

## 2023-03-20 RX ADMIN — Medication 50 MILLIGRAM(S): at 05:15

## 2023-03-20 RX ADMIN — Medication 81 MILLIGRAM(S): at 17:33

## 2023-03-20 RX ADMIN — Medication 5 MILLIGRAM(S): at 21:38

## 2023-03-20 RX ADMIN — Medication 50 MILLIGRAM(S): at 17:33

## 2023-03-20 NOTE — PROGRESS NOTE ADULT - SUBJECTIVE AND OBJECTIVE BOX
Patient is a 47y old  Female who presents with a chief complaint of CAD (20 Mar 2023 11:03)      SUBJECTIVE / OVERNIGHT EVENTS: occasional CP   Review of Systems  palpitations no  sob no  nausea no  headache no    MEDICATIONS  (STANDING):  aspirin enteric coated 81 milliGRAM(s) Oral daily  atorvastatin 80 milliGRAM(s) Oral at bedtime  coronavirus bivalent (EUA) Booster Vaccine (PFIZER) 0.3 milliLiter(s) IntraMuscular once  dextrose 5%. 1000 milliLiter(s) (100 mL/Hr) IV Continuous <Continuous>  dextrose 5%. 1000 milliLiter(s) (50 mL/Hr) IV Continuous <Continuous>  dextrose 50% Injectable 25 Gram(s) IV Push once  dextrose 50% Injectable 25 Gram(s) IV Push once  dextrose 50% Injectable 12.5 Gram(s) IV Push once  glucagon  Injectable 1 milliGRAM(s) IntraMuscular once  influenza   Vaccine 0.5 milliLiter(s) IntraMuscular once  insulin lispro (ADMELOG) corrective regimen sliding scale   SubCutaneous at bedtime  insulin lispro (ADMELOG) corrective regimen sliding scale   SubCutaneous three times a day before meals  metoprolol succinate ER 50 milliGRAM(s) Oral two times a day  sodium chloride 0.9%. 1000 milliLiter(s) (75 mL/Hr) IV Continuous <Continuous>    MEDICATIONS  (PRN):  dextrose Oral Gel 15 Gram(s) Oral once PRN Blood Glucose LESS THAN 70 milliGRAM(s)/deciliter  melatonin 5 milliGRAM(s) Oral at bedtime PRN Insomnia      Vital Signs Last 24 Hrs  T(C): 37.3 (20 Mar 2023 11:42), Max: 37.4 (20 Mar 2023 05:03)  T(F): 99.1 (20 Mar 2023 11:42), Max: 99.3 (20 Mar 2023 05:03)  HR: 85 (20 Mar 2023 11:42) (75 - 90)  BP: 123/84 (20 Mar 2023 11:42) (97/63 - 136/82)  BP(mean): --  RR: 18 (20 Mar 2023 11:42) (16 - 18)  SpO2: 100% (20 Mar 2023 11:42) (99% - 100%)    Parameters below as of 20 Mar 2023 11:42  Patient On (Oxygen Delivery Method): room air        PHYSICAL EXAM:  GENERAL: NAD, well-developed  HEAD:  Atraumatic, Normocephalic  EYES: EOMI, PERRLA, conjunctiva and sclera clear  NECK: Supple, No JVD  CHEST/LUNG: Clear to auscultation bilaterally; No wheeze  HEART: Regular rate and rhythm; No murmurs, rubs, or gallops  ABDOMEN: Soft, Nontender, Nondistended; Bowel sounds present  EXTREMITIES:  2+ Peripheral Pulses, No clubbing, cyanosis, or edema  PSYCH: AAOx3  NEUROLOGY: non-focal  SKIN: No rashes or lesions    LABS:                        10.0   7.32  )-----------( 234      ( 20 Mar 2023 05:50 )             33.0     03-20    137  |  105  |  14  ----------------------------<  170<H>  4.1   |  23  |  0.51    Ca    9.1      20 Mar 2023 05:50  Mg     1.9     03-20                  RADIOLOGY & ADDITIONAL TESTS:    Imaging Personally Reviewed:    Consultant(s) Notes Reviewed:      Care Discussed with Consultants/Other Providers:

## 2023-03-20 NOTE — PROGRESS NOTE ADULT - SUBJECTIVE AND OBJECTIVE BOX
Overnight Events: s/p C with  LAD    Telemetry: NSR 80s-90s    Review Of Systems: No chest pain, shortness of breath, or palpitations  CONSTITUTIONAL: no fevers or chills  EYES/ENT: No visual changes;  No vertigo or throat pain   NECK: No pain or stiffness  RESPIRATORY: No cough, wheezing. No shortness of breath  CARDIOVASCULAR: No chest pain or palpitations  GASTROINTESTINAL: No abdominal or epigastric pain. No nausea, vomiting. No diarrhea. No melena.  GENITOURINARY: No dysuria, frequency or hematuria  NEUROLOGICAL: No numbness or weakness  SKIN: No itching, burning, rashes, or lesions   All other review of systems is negative unless indicated above.     Current Meds:  aspirin enteric coated 81 milliGRAM(s) Oral daily  atorvastatin 80 milliGRAM(s) Oral at bedtime  coronavirus bivalent (EUA) Booster Vaccine (PFIZER) 0.3 milliLiter(s) IntraMuscular once  dextrose 5%. 1000 milliLiter(s) IV Continuous <Continuous>  dextrose 5%. 1000 milliLiter(s) IV Continuous <Continuous>  dextrose 50% Injectable 25 Gram(s) IV Push once  dextrose 50% Injectable 25 Gram(s) IV Push once  dextrose 50% Injectable 12.5 Gram(s) IV Push once  dextrose Oral Gel 15 Gram(s) Oral once PRN  glucagon  Injectable 1 milliGRAM(s) IntraMuscular once  influenza   Vaccine 0.5 milliLiter(s) IntraMuscular once  insulin lispro (ADMELOG) corrective regimen sliding scale   SubCutaneous at bedtime  insulin lispro (ADMELOG) corrective regimen sliding scale   SubCutaneous three times a day before meals  melatonin 5 milliGRAM(s) Oral at bedtime PRN  metoprolol succinate ER 50 milliGRAM(s) Oral two times a day  sodium chloride 0.9%. 1000 milliLiter(s) IV Continuous <Continuous>    Vitals:  T(F): 99.3 (03-20), Max: 99.3 (03-20)  HR: 90 (03-20) (75 - 99)  BP: 104/57 (03-20) (97/63 - 139/85)  RR: 18 (03-20)  SpO2: 99% (03-20)  I&O's Summary    19 Mar 2023 07:01  -  20 Mar 2023 07:00  --------------------------------------------------------  IN: 960 mL / OUT: 0 mL / NET: 960 mL    20 Mar 2023 07:01  -  20 Mar 2023 11:04  --------------------------------------------------------  IN: 120 mL / OUT: 0 mL / NET: 120 mL    Physical Exam:  Appearance: No acute distress; well appearing  Eyes: PERRL, EOMI, pink conjunctiva  HEENT: Normal oral mucosa  Cardiovascular: RRR, S1, S2, no murmurs, rubs, or gallops; no JVD  Respiratory: Clear to auscultation bilaterally  Gastrointestinal: soft, non-tender, non-distended with normal bowel sounds  Extremities: No edema  Musculoskeletal: No clubbing; no joint deformity   Neurologic: Non-focal  Lymphatic: No lymphadenopathy  Psychiatry: AAOx3, mood & affect appropriate  Skin: No rashes, ecchymoses, or cyanosis                          10.0   7.32  )-----------( 234      ( 20 Mar 2023 05:50 )             33.0     03-20    137  |  105  |  14  ----------------------------<  170<H>  4.1   |  23  |  0.51    Ca    9.1      20 Mar 2023 05:50  Mg     1.9     03-20        CARDIAC MARKERS ( 17 Mar 2023 22:51 )  <6 ng/L / x     / x     / x     / x     / x

## 2023-03-20 NOTE — PROGRESS NOTE ADULT - ASSESSMENT
Assessment and Recommendations:  47 year old F with history of DM2 with intermittent chest pain, found to have  of LAD, awaiting surgical evaluation.    1) CAD ( of LAD)  - as per discussion with Dr. Colbert (interventional cardiology), please consult CTS (Dr. Nix) re: possible ?robotic LIMA to LAD  - continue ASA 81mg and atorvastatin 80mg daily  - continue metop succ 50 bid for anti-anginal effect  - outpatient f/u with Dr. Mandeep Dejesus MD  Cardiology Attending   [facility] - Cardiology  [phone] [place] [address], [address] Floor, [address], [place] [address]  Office: [phone]  Fax:  [phone]    All Cardiology service information can be found 24/7 on [email or web address], password: [email or web address]

## 2023-03-20 NOTE — CONSULT NOTE ADULT - ASSESSMENT
This is a 46y/o Female with PMHx of DM2 on metformin at home who presented to the ED yesterday with c/o HA and intermittent left sided CP over the last couple days. Pt states she had episodes of CP for several years in her home country [facility] but her ECG's were normal so no further evaluation was done. CTA coronaries performed which showed a severe obstruction of the pLAD. Pt now s/p LHC demonstrating 100% stenosis in ostial LAD and CT Surgery consulted for CABG evaluation.

## 2023-03-20 NOTE — PROGRESS NOTE ADULT - ASSESSMENT
47 f with    CAD/ Chest pain/ Abnormal CT coronaries   - telemetry  - ASA  - BB  - Cardiology evaluation   - cath high risk as per cardiology  - CT Surgery evaluation noted. awaiting recommendations      Diabetes Mellitus  - BS control  - ADA diet     DVT prophylaxis  - low risk     d/w patient     Jan Mitchell MD phone [phone]

## 2023-03-20 NOTE — CONSULT NOTE ADULT - NS ATTEND AMEND GEN_ALL_CORE FT
Pt seen and examined.  Unstable angina with occluded LAD.  Risk/benefits CABG sternotomy vs mid cab d/w pt.  Pt wishes to undergo midcab.  STS risk 1-2%  Agree to proceed

## 2023-03-20 NOTE — CONSULT NOTE ADULT - SUBJECTIVE AND OBJECTIVE BOX
History of Present Illness:  47yoF presenting to the ED yesterday with a HA over last couple days.   During her evaluation she noted that she has had several episodes of intermittent left sided CP over the last couple days. States that it is exertional in nature and radiates to her left arm. States that she can walk about 1/2block before she starts to have symptoms. Prior to this she reports that she had a couple short lived episodes of [place] but her ECG's were normal so no further evaluation was done. She denies LE edema, orthopnea, PND.   Her ECG was NSR without ischemic findings and her Trop T was <6.   As a result she was ordered for a CTa coronaries which is concerning for a severe obstruction of the pLAD.  (18 Mar 2023 20:02)       Past Medical History  No pertinent past medical history    DM (diabetes mellitus)  On metformin    COVID-19 vaccine series completed        Past Surgical History  H/O  section        MEDICATIONS  (STANDING):  aspirin enteric coated 81 milliGRAM(s) Oral daily  atorvastatin 80 milliGRAM(s) Oral at bedtime  coronavirus bivalent (EUA) Booster Vaccine (PFIZER) 0.3 milliLiter(s) IntraMuscular once  influenza   Vaccine 0.5 milliLiter(s) IntraMuscular once  insulin lispro (ADMELOG) corrective regimen sliding scale   SubCutaneous at bedtime  insulin lispro (ADMELOG) corrective regimen sliding scale   SubCutaneous three times a day before meals  metoprolol succinate ER 50 milliGRAM(s) Oral two times a day  sodium chloride 0.9%. 1000 milliLiter(s) (75 mL/Hr) IV Continuous <Continuous>        Vital Signs Last 24 Hrs  T(C): 37.4 (23 @ 05:03), Max: 37.4 (23 @ 05:03)  T(F): 99.3 (23 @ 05:03), Max: 99.3 (23 @ 05:03)  HR: 90 (23 @ 05:03) (75 - 99)  BP: 104/57 (23 @ 05:14) (97/63 - 139/85)  RR: 18 (23 @ 05:03) (14 - 18)  SpO2: 99% (23 @ 05:03) (99% - 100%)             Height (cm): 167.6   Weight (kg): 77.6   BMI (kg/m2): 27.6 (17 Mar 2023 19:53)    Allergies: No Known Allergies      SOCIAL HISTORY:  , lives with , works as   Smoker: [ ] Yes  [X] No                 Pt denies  ETOH use: [ ] Yes  [X] No              Pt denies  Ilicit Drug use:  [ ] Yes  [X] No       Pt denies    FAMILY HISTORY:  FH: heart disease (Father)        Review of Systems  GENERAL:  no weakness, fatigue, fevers or chills  NEURO: no dizziness, numbness, tingling or weakness  SKIN: no itching, burning, rashes, or lesions   HEENT: no visual changes;  no headache, no vertigo, no recent colds  RESPIRATORY: no shortness of breath, no cough, sputum, wheezing  CARDIOVASCULAR:  no chest pain,  or palpitations  GI: no abd pain. no N/V/D.  PERIPHERAL VASCULAR: no swelling, no tenderness, no erythema      PHYSICAL EXAM  General: Well nourished, well developed, NAD.                                              Neuro: Normal exam oriented to person/place & time with no focal motor or sensory  deficits.                    Eyes: Normal exam of conjunctiva & lids, pupils equally reactive.   ENT: Normal exam of nasal/oral mucosa with absence of cyanosis.   Neck: Normal exam of jugular veins, trachea & thyroid.   Chest: Normal lung exam with good air movement absence of wheezes, rales, or rhonchi.                                                                         CV:  Auscultation: normal S1S2, RRR   Carotids: No Bruits[X]  Abdominal Aorta: normal [X] nonpalpable[X]                                                                         GI: Normal exam of abdomen with no noted masses or tenderness. +[id number]                                                                                            Extremities: Normal no evidence of cyanosis or deformity, Edema: none  Lower Extremity Pulses: Right[+2DP] Left[+2DP] Varicosities[none]  SKIN : Normal exam to inspection & palpation. +Right radial incision w/DSD CDI no hematoma.                                                            LABS:                        10.0   7.32  )-----------( 234      ( 20 Mar 2023 05:50 )             33.0     137  |  105  |  14  ----------------------------<  170<H>  4.1   |  23  |  0.51    3/19 s/p LHC with ostial % stenosis  via RRA by Dr. CHAPARRO Colbert

## 2023-03-21 LAB
APPEARANCE UR: CLEAR — SIGNIFICANT CHANGE UP
BILIRUB UR-MCNC: NEGATIVE — SIGNIFICANT CHANGE UP
COLOR SPEC: YELLOW — SIGNIFICANT CHANGE UP
DIFF PNL FLD: NEGATIVE — SIGNIFICANT CHANGE UP
GLUCOSE BLDC GLUCOMTR-MCNC: 105 MG/DL — HIGH (ref 70–99)
GLUCOSE BLDC GLUCOMTR-MCNC: 165 MG/DL — HIGH (ref 70–99)
GLUCOSE BLDC GLUCOMTR-MCNC: 179 MG/DL — HIGH (ref 70–99)
GLUCOSE BLDC GLUCOMTR-MCNC: 208 MG/DL — HIGH (ref 70–99)
GLUCOSE UR QL: NEGATIVE — SIGNIFICANT CHANGE UP
HCT VFR BLD CALC: 35.7 % — SIGNIFICANT CHANGE UP (ref 34.5–45)
HGB BLD-MCNC: 10.8 G/DL — LOW (ref 11.5–15.5)
KETONES UR-MCNC: NEGATIVE — SIGNIFICANT CHANGE UP
LEUKOCYTE ESTERASE UR-ACNC: NEGATIVE — SIGNIFICANT CHANGE UP
MCHC RBC-ENTMCNC: 26.2 PG — LOW (ref 27–34)
MCHC RBC-ENTMCNC: 30.3 GM/DL — LOW (ref 32–36)
MCV RBC AUTO: 86.7 FL — SIGNIFICANT CHANGE UP (ref 80–100)
MRSA PCR RESULT.: SIGNIFICANT CHANGE UP
NITRITE UR-MCNC: NEGATIVE — SIGNIFICANT CHANGE UP
NRBC # BLD: 0 /100 WBCS — SIGNIFICANT CHANGE UP (ref 0–0)
PH UR: 6 — SIGNIFICANT CHANGE UP (ref 5–8)
PLATELET # BLD AUTO: 205 K/UL — SIGNIFICANT CHANGE UP (ref 150–400)
PROT UR-MCNC: ABNORMAL
RBC # BLD: 4.12 M/UL — SIGNIFICANT CHANGE UP (ref 3.8–5.2)
RBC # FLD: 13.4 % — SIGNIFICANT CHANGE UP (ref 10.3–14.5)
S AUREUS DNA NOSE QL NAA+PROBE: SIGNIFICANT CHANGE UP
SP GR SPEC: 1.04 — HIGH (ref 1.01–1.02)
T3 SERPL-MCNC: 82 NG/DL — SIGNIFICANT CHANGE UP (ref 80–200)
T4 AB SER-ACNC: 7 UG/DL — SIGNIFICANT CHANGE UP (ref 4.6–12)
TSH SERPL-MCNC: 4.37 UIU/ML — HIGH (ref 0.27–4.2)
UROBILINOGEN FLD QL: NEGATIVE — SIGNIFICANT CHANGE UP
WBC # BLD: 6.26 K/UL — SIGNIFICANT CHANGE UP (ref 3.8–10.5)
WBC # FLD AUTO: 6.26 K/UL — SIGNIFICANT CHANGE UP (ref 3.8–10.5)

## 2023-03-21 PROCEDURE — 99233 SBSQ HOSP IP/OBS HIGH 50: CPT

## 2023-03-21 PROCEDURE — 94010 BREATHING CAPACITY TEST: CPT | Mod: 26

## 2023-03-21 RX ADMIN — Medication 50 MILLIGRAM(S): at 05:09

## 2023-03-21 RX ADMIN — ATORVASTATIN CALCIUM 80 MILLIGRAM(S): 80 TABLET, FILM COATED ORAL at 21:08

## 2023-03-21 RX ADMIN — Medication 5 MILLIGRAM(S): at 21:09

## 2023-03-21 RX ADMIN — Medication 50 MILLIGRAM(S): at 17:41

## 2023-03-21 RX ADMIN — Medication 81 MILLIGRAM(S): at 11:25

## 2023-03-21 RX ADMIN — Medication 1: at 08:34

## 2023-03-21 RX ADMIN — Medication 2: at 11:34

## 2023-03-21 NOTE — PROGRESS NOTE ADULT - ASSESSMENT
47 f with    CAD/ Chest pain/ Abnormal CT coronaries   - telemetry  - ASA  - BB  - Cardiology evaluation   - cath high risk as per cardiology  - CT Surgery evaluation noted. . For CABG with dr Nix       Diabetes Mellitus  - BS control  - ADA diet     DVT prophylaxis  - low risk     d/w patient and family       Jan Mitchell MD phone [phone]

## 2023-03-21 NOTE — PROGRESS NOTE ADULT - ASSESSMENT
Assessment and Recommendations:  47 year old F with history of DM2 with intermittent chest pain, found to have  of LAD. After discussion with CTs and interventional cardiology, decision made to undergo MIDCAB.    1) CAD ( of LAD)  - f/u Dr. Nix's team recs re: MIDCAB  - continue ASA 81mg and atorvastatin 80mg daily  - continue metop succ 50 bid for anti-anginal effect  - recommend outpatient f/u with Dr. Mandeep Dejesus MD  Cardiology Attending  [facility] - Cardiology  [phone] [place] [address], 4[address] Floor, [address], [place] [address]  Office: [phone]  Fax:  [phone]    All Cardiology service information can be found 24/7 on [email or web address], password: [email or web address]

## 2023-03-21 NOTE — PROGRESS NOTE ADULT - SUBJECTIVE AND OBJECTIVE BOX
Overnight Events: federico TAVARES has been walking around. Minimal chest pain.    Telemetry: NSR 70s-80s    Review Of Systems: No chest pain, shortness of breath, or palpitations  CONSTITUTIONAL: no fevers or chills  EYES/ENT: No visual changes;  No vertigo or throat pain   NECK: No pain or stiffness  RESPIRATORY: No cough, wheezing. No shortness of breath  CARDIOVASCULAR: No chest pain or palpitations  GASTROINTESTINAL: No abdominal or epigastric pain. No nausea, vomiting. No diarrhea. No melena.  GENITOURINARY: No dysuria, frequency or hematuria  NEUROLOGICAL: No numbness or weakness  SKIN: No itching, burning, rashes, or lesions   All other review of systems is negative unless indicated above.     Current Meds:  acetaminophen     Tablet .. 650 milliGRAM(s) Oral every 6 hours PRN  aspirin enteric coated 81 milliGRAM(s) Oral daily  atorvastatin 80 milliGRAM(s) Oral at bedtime  coronavirus bivalent (EUA) Booster Vaccine (PFIZER) 0.3 milliLiter(s) IntraMuscular once  dextrose 5%. 1000 milliLiter(s) IV Continuous <Continuous>  dextrose 5%. 1000 milliLiter(s) IV Continuous <Continuous>  dextrose 50% Injectable 25 Gram(s) IV Push once  dextrose 50% Injectable 25 Gram(s) IV Push once  dextrose 50% Injectable 12.5 Gram(s) IV Push once  dextrose Oral Gel 15 Gram(s) Oral once PRN  glucagon  Injectable 1 milliGRAM(s) IntraMuscular once  influenza   Vaccine 0.5 milliLiter(s) IntraMuscular once  insulin lispro (ADMELOG) corrective regimen sliding scale   SubCutaneous at bedtime  insulin lispro (ADMELOG) corrective regimen sliding scale   SubCutaneous three times a day before meals  melatonin 5 milliGRAM(s) Oral at bedtime PRN  metoprolol succinate ER 50 milliGRAM(s) Oral two times a day  sodium chloride 0.9%. 1000 milliLiter(s) IV Continuous <Continuous>      Vitals:  T(F): 97.8 (03-21), Max: 99.4 (03-20)  HR: 78 (03-21) (78 - 95)  BP: 100/58 (03-21) (92/56 - 114/76)  RR: 18 (03-21)  SpO2: 98% (03-21)  I&O's Summary    20 Mar 2023 07:01  -  21 Mar 2023 07:00  --------------------------------------------------------  IN: 420 mL / OUT: 0 mL / NET: 420 mL    21 Mar 2023 07:01  -  21 Mar 2023 12:12  --------------------------------------------------------  IN: 118 mL / OUT: 0 mL / NET: 118 mL        Physical Exam:  Appearance: No acute distress; well appearing  Eyes: PERRL, EOMI, pink conjunctiva  HEENT: Normal oral mucosa  Cardiovascular: RRR, S1, S2, no murmurs, rubs, or gallops; no JVD  Respiratory: Clear to auscultation bilaterally  Gastrointestinal: soft, non-tender, non-distended with normal bowel sounds  Extremities: No edema  Musculoskeletal: No clubbing; no joint deformity   Neurologic: Non-focal  Lymphatic: No lymphadenopathy  Psychiatry: AAOx3, mood & affect appropriate  Skin: No rashes, ecchymoses, or cyanosis                          10.8   6.26  )-----------( 205      ( 21 Mar 2023 06:14 )             35.7     03-20    137  |  105  |  14  ----------------------------<  170<H>  4.1   |  23  |  0.51    Ca    9.1      20 Mar 2023 05:50  Mg     1.9     03-20      PT/INR - ( 20 Mar 2023 22:54 )   PT: 15.0 sec;   INR: 1.30 ratio         PTT - ( 20 Mar 2023 22:54 )  PTT:30.4 sec  CARDIAC MARKERS ( 17 Mar 2023 22:51 )  <6 ng/L / x     / x     / x     / x     / x            Echo:------------------------------------------------------------------------  Conclusions:  1. Normal left ventricular internal dimensions and wall  thicknesses.  2. Normal left ventricular systolic function. No segmental  wall motion abnormalities.  3. Strain imaging was performed with a HR of bpm 85 and a  BP of 104/57. Global L. Strain= -18.7%.  4. Normal right ventricular size and function.  5. Estimated pulmonary artery systolic pressure equals 24  mm Hg, assuming right atrial pressure equals 8  mm Hg,  consistent with normal pulmonary pressures.  ------------------------------------------------------------------------  Confirmed on  3/20/2023 - 19:36:59 by PORSCHE Cadena  ------------------------------------------------------------------------

## 2023-03-21 NOTE — PROGRESS NOTE ADULT - SUBJECTIVE AND OBJECTIVE BOX
Patient is a 47y old  Female who presents with a chief complaint of chest pain (21 Mar 2023 12:12)      SUBJECTIVE / OVERNIGHT EVENTS: No new complaints.  Family at bedside.   Review of Systems  chest pain yes  palpitations no  sob no  nausea no  headache no    MEDICATIONS  (STANDING):  aspirin enteric coated 81 milliGRAM(s) Oral daily  atorvastatin 80 milliGRAM(s) Oral at bedtime  coronavirus bivalent (EUA) Booster Vaccine (PFIZER) 0.3 milliLiter(s) IntraMuscular once  dextrose 5%. 1000 milliLiter(s) (100 mL/Hr) IV Continuous <Continuous>  dextrose 5%. 1000 milliLiter(s) (50 mL/Hr) IV Continuous <Continuous>  dextrose 50% Injectable 25 Gram(s) IV Push once  dextrose 50% Injectable 25 Gram(s) IV Push once  dextrose 50% Injectable 12.5 Gram(s) IV Push once  glucagon  Injectable 1 milliGRAM(s) IntraMuscular once  influenza   Vaccine 0.5 milliLiter(s) IntraMuscular once  insulin lispro (ADMELOG) corrective regimen sliding scale   SubCutaneous at bedtime  insulin lispro (ADMELOG) corrective regimen sliding scale   SubCutaneous three times a day before meals  metoprolol succinate ER 50 milliGRAM(s) Oral two times a day  sodium chloride 0.9%. 1000 milliLiter(s) (75 mL/Hr) IV Continuous <Continuous>    MEDICATIONS  (PRN):  acetaminophen     Tablet .. 650 milliGRAM(s) Oral every 6 hours PRN Temp greater or equal to 38C (100.4F), Mild Pain (1 - 3)  dextrose Oral Gel 15 Gram(s) Oral once PRN Blood Glucose LESS THAN 70 milliGRAM(s)/deciliter  melatonin 5 milliGRAM(s) Oral at bedtime PRN Insomnia      Vital Signs Last 24 Hrs  T(C): 36.8 (21 Mar 2023 12:21), Max: 37.4 (20 Mar 2023 20:24)  T(F): 98.3 (21 Mar 2023 12:21), Max: 99.4 (20 Mar 2023 20:24)  HR: 83 (21 Mar 2023 12:21) (78 - 95)  BP: 96/67 (21 Mar 2023 12:21) (92/56 - 114/76)  BP(mean): --  RR: 18 (21 Mar 2023 12:21) (16 - 18)  SpO2: 100% (21 Mar 2023 12:21) (98% - 100%)    Parameters below as of 21 Mar 2023 12:21  Patient On (Oxygen Delivery Method): room air        PHYSICAL EXAM:  GENERAL: NAD, well-developed  HEAD:  Atraumatic, Normocephalic  EYES: EOMI, PERRLA, conjunctiva and sclera clear  NECK: Supple, No JVD  CHEST/LUNG: Clear to auscultation bilaterally; No wheeze  HEART: Regular rate and rhythm; No murmurs, rubs, or gallops  ABDOMEN: Soft, Nontender, Nondistended; Bowel sounds present  EXTREMITIES:  2+ Peripheral Pulses, No clubbing, cyanosis, or edema  PSYCH: AAOx3  NEUROLOGY: non-focal  SKIN: No rashes or lesions    LABS:                        10.8   6.26  )-----------( 205      ( 21 Mar 2023 06:14 )             35.7     03-20    137  |  105  |  14  ----------------------------<  170<H>  4.1   |  23  |  0.51    Ca    9.1      20 Mar 2023 05:50  Mg     1.9     03-20      PT/INR - ( 20 Mar 2023 22:54 )   PT: 15.0 sec;   INR: 1.30 ratio         PTT - ( 20 Mar 2023 22:54 )  PTT:30.4 sec      Urinalysis Basic - ( 21 Mar 2023 06:14 )    Color: Yellow / Appearance: Clear / S.036 / pH: x  Gluc: x / Ketone: Negative  / Bili: Negative / Urobili: Negative   Blood: x / Protein: 30 mg/dL / Nitrite: Negative   Leuk Esterase: Negative / RBC: 17 /hpf / WBC 4 /HPF   Sq Epi: x / Non Sq Epi: 5 /hpf / Bacteria: Few          RADIOLOGY & ADDITIONAL TESTS:    Imaging Personally Reviewed:    Consultant(s) Notes Reviewed:      Care Discussed with Consultants/Other Providers:

## 2023-03-22 LAB
ANION GAP SERPL CALC-SCNC: 12 MMOL/L — SIGNIFICANT CHANGE UP (ref 5–17)
BASOPHILS # BLD AUTO: 0.03 K/UL — SIGNIFICANT CHANGE UP (ref 0–0.2)
BASOPHILS NFR BLD AUTO: 0.5 % — SIGNIFICANT CHANGE UP (ref 0–2)
BUN SERPL-MCNC: 14 MG/DL — SIGNIFICANT CHANGE UP (ref 7–23)
CALCIUM SERPL-MCNC: 9.1 MG/DL — SIGNIFICANT CHANGE UP (ref 8.4–10.5)
CHLORIDE SERPL-SCNC: 102 MMOL/L — SIGNIFICANT CHANGE UP (ref 96–108)
CO2 SERPL-SCNC: 23 MMOL/L — SIGNIFICANT CHANGE UP (ref 22–31)
CREAT SERPL-MCNC: 0.52 MG/DL — SIGNIFICANT CHANGE UP (ref 0.5–1.3)
EGFR: 117 ML/MIN/1.73M2 — SIGNIFICANT CHANGE UP
EOSINOPHIL # BLD AUTO: 0.07 K/UL — SIGNIFICANT CHANGE UP (ref 0–0.5)
EOSINOPHIL NFR BLD AUTO: 1.3 % — SIGNIFICANT CHANGE UP (ref 0–6)
GLUCOSE BLDC GLUCOMTR-MCNC: 167 MG/DL — HIGH (ref 70–99)
GLUCOSE BLDC GLUCOMTR-MCNC: 194 MG/DL — HIGH (ref 70–99)
GLUCOSE BLDC GLUCOMTR-MCNC: 205 MG/DL — HIGH (ref 70–99)
GLUCOSE BLDC GLUCOMTR-MCNC: 234 MG/DL — HIGH (ref 70–99)
GLUCOSE SERPL-MCNC: 183 MG/DL — HIGH (ref 70–99)
HCT VFR BLD CALC: 35.2 % — SIGNIFICANT CHANGE UP (ref 34.5–45)
HGB BLD-MCNC: 10.8 G/DL — LOW (ref 11.5–15.5)
LYMPHOCYTES # BLD AUTO: 2.24 K/UL — SIGNIFICANT CHANGE UP (ref 1–3.3)
LYMPHOCYTES # BLD AUTO: 40 % — SIGNIFICANT CHANGE UP (ref 13–44)
MCHC RBC-ENTMCNC: 26.2 PG — LOW (ref 27–34)
MCHC RBC-ENTMCNC: 30.7 GM/DL — LOW (ref 32–36)
MCV RBC AUTO: 85.2 FL — SIGNIFICANT CHANGE UP (ref 80–100)
MONOCYTES # BLD AUTO: 0.72 K/UL — SIGNIFICANT CHANGE UP (ref 0–0.9)
MONOCYTES NFR BLD AUTO: 12.9 % — SIGNIFICANT CHANGE UP (ref 2–14)
NEUTROPHILS # BLD AUTO: 2.54 K/UL — SIGNIFICANT CHANGE UP (ref 1.8–7.4)
NEUTROPHILS NFR BLD AUTO: 45.3 % — SIGNIFICANT CHANGE UP (ref 43–77)
NRBC # BLD: 0 /100 WBCS — SIGNIFICANT CHANGE UP (ref 0–0)
PLATELET # BLD AUTO: 210 K/UL — SIGNIFICANT CHANGE UP (ref 150–400)
POTASSIUM SERPL-MCNC: 4 MMOL/L — SIGNIFICANT CHANGE UP (ref 3.5–5.3)
POTASSIUM SERPL-SCNC: 4 MMOL/L — SIGNIFICANT CHANGE UP (ref 3.5–5.3)
RBC # BLD: 4.13 M/UL — SIGNIFICANT CHANGE UP (ref 3.8–5.2)
RBC # FLD: 13.4 % — SIGNIFICANT CHANGE UP (ref 10.3–14.5)
SODIUM SERPL-SCNC: 137 MMOL/L — SIGNIFICANT CHANGE UP (ref 135–145)
WBC # BLD: 5.6 K/UL — SIGNIFICANT CHANGE UP (ref 3.8–10.5)
WBC # FLD AUTO: 5.6 K/UL — SIGNIFICANT CHANGE UP (ref 3.8–10.5)

## 2023-03-22 PROCEDURE — 99232 SBSQ HOSP IP/OBS MODERATE 35: CPT

## 2023-03-22 RX ADMIN — Medication 2: at 17:30

## 2023-03-22 RX ADMIN — Medication 50 MILLIGRAM(S): at 05:21

## 2023-03-22 RX ADMIN — Medication 81 MILLIGRAM(S): at 11:28

## 2023-03-22 RX ADMIN — Medication 1: at 08:41

## 2023-03-22 RX ADMIN — Medication 5 MILLIGRAM(S): at 20:47

## 2023-03-22 RX ADMIN — Medication 2: at 11:48

## 2023-03-22 RX ADMIN — Medication 50 MILLIGRAM(S): at 18:14

## 2023-03-22 RX ADMIN — ATORVASTATIN CALCIUM 80 MILLIGRAM(S): 80 TABLET, FILM COATED ORAL at 20:47

## 2023-03-22 NOTE — PROGRESS NOTE ADULT - ASSESSMENT
47 f with    CAD/ Chest pain/ Abnormal CT coronaries    - telemetry  - ASA  - BB  - Cardiology evaluation   - cath high risk as per cardiology  - CT Surgery evaluation noted. . For CABG with dr Nix       Diabetes Mellitus  - BS control  - ADA diet     DVT prophylaxis  - low risk     d/w patient     Jan Mitchell MD phone [phone]

## 2023-03-22 NOTE — PROGRESS NOTE ADULT - SUBJECTIVE AND OBJECTIVE BOX
Overnight Events: NAEON. Occasionally still has chest pain    Telemetry: NSR 70s-100s    Review Of Systems: No chest pain, shortness of breath, or palpitations  CONSTITUTIONAL: no fevers or chills  EYES/ENT: No visual changes;  No vertigo or throat pain   NECK: No pain or stiffness  RESPIRATORY: No cough, wheezing. No shortness of breath  CARDIOVASCULAR: No chest pain or palpitations  GASTROINTESTINAL: No abdominal or epigastric pain. No nausea, vomiting. No diarrhea. No melena.  GENITOURINARY: No dysuria, frequency or hematuria  NEUROLOGICAL: No numbness or weakness  SKIN: No itching, burning, rashes, or lesions   All other review of systems is negative unless indicated above.     Current Meds:  acetaminophen     Tablet .. 650 milliGRAM(s) Oral every 6 hours PRN  aspirin enteric coated 81 milliGRAM(s) Oral daily  atorvastatin 80 milliGRAM(s) Oral at bedtime  coronavirus bivalent (EUA) Booster Vaccine (PFIZER) 0.3 milliLiter(s) IntraMuscular once  dextrose 5%. 1000 milliLiter(s) IV Continuous <Continuous>  dextrose 5%. 1000 milliLiter(s) IV Continuous <Continuous>  dextrose 50% Injectable 25 Gram(s) IV Push once  dextrose 50% Injectable 25 Gram(s) IV Push once  dextrose 50% Injectable 12.5 Gram(s) IV Push once  dextrose Oral Gel 15 Gram(s) Oral once PRN  glucagon  Injectable 1 milliGRAM(s) IntraMuscular once  influenza   Vaccine 0.5 milliLiter(s) IntraMuscular once  insulin lispro (ADMELOG) corrective regimen sliding scale   SubCutaneous at bedtime  insulin lispro (ADMELOG) corrective regimen sliding scale   SubCutaneous three times a day before meals  melatonin 5 milliGRAM(s) Oral at bedtime PRN  metoprolol succinate ER 50 milliGRAM(s) Oral two times a day  sodium chloride 0.9%. 1000 milliLiter(s) IV Continuous <Continuous>    Vitals:  T(F): 98.2 (03-22), Max: 99.5 (03-21)  HR: 83 (03-22) (71 - 87)  BP: 109/68 (03-22) (96/67 - 117/74)  RR: 16 (03-22)  SpO2: 99% (03-22)  I&O's Summary    21 Mar 2023 07:01  -  22 Mar 2023 07:00  --------------------------------------------------------  IN: 656 mL / OUT: 0 mL / NET: 656 mL    Physical Exam:  Appearance: No acute distress; well appearing  Eyes: PERRL, EOMI, pink conjunctiva  HEENT: Normal oral mucosa  Cardiovascular: RRR, S1, S2, no murmurs, rubs, or gallops; no JVD  Respiratory: Clear to auscultation bilaterally  Gastrointestinal: soft, non-tender, non-distended with normal bowel sounds  Extremities: No edema  Musculoskeletal: No clubbing; no joint deformity   Neurologic: Non-focal  Lymphatic: No lymphadenopathy  Psychiatry: AAOx3, mood & affect appropriate  Skin: No rashes, ecchymoses, or cyanosis                          10.8   5.60  )-----------( 210      ( 22 Mar 2023 05:51 )             35.2     03-22    137  |  102  |  14  ----------------------------<  183<H>  4.0   |  23  |  0.52    Ca    9.1      22 Mar 2023 05:51      PT/INR - ( 20 Mar 2023 22:54 )   PT: 15.0 sec;   INR: 1.30 ratio      PTT - ( 20 Mar 2023 22:54 )  PTT:30.4 sec  CARDIAC MARKERS ( 17 Mar 2023 22:51 )  <6 ng/L / x     / x     / x     / x     / x          Echo:------------------------------------------------------------------------  Conclusions:  1. Normal left ventricular internal dimensions and wall  thicknesses.  2. Normal left ventricular systolic function. No segmental  wall motion abnormalities.  3. Strain imaging was performed with a HR of bpm 85 and a  BP of 104/57. Global L. Strain= -18.7%.  4. Normal right ventricular size and function.  5. Estimated pulmonary artery systolic pressure equals 24  mm Hg, assuming right atrial pressure equals 8  mm Hg,  consistent with normal pulmonary pressures.  ------------------------------------------------------------------------  Confirmed on  3/20/2023 - 19:36:59 by PORSCHE Cadena  ------------------------------------------------------------------------      Cath:Coronary Angiography   LM   Left main artery: Angiography shows minor irregularities.      LAD   Proximal left anterior descending: moderate R -> L collaterals. .  There is a 100 % stenosis in the proximal third portion of the  segment. This lesion is a chronic total occlusion.      CX   Circumflex: Angiography shows mild atherosclerosis.      RCA   Right coronary artery: Angiography shows mild atherosclerosis.      Imaging:

## 2023-03-22 NOTE — PROGRESS NOTE ADULT - SUBJECTIVE AND OBJECTIVE BOX
Patient is a 47y old  Female who presents with a chief complaint of CAD (22 Mar 2023 10:27)      SUBJECTIVE / OVERNIGHT EVENTS: Comfortable without new complaints.   Review of Systems  chest pain occasional   palpitations no  sob no  nausea no  headache no    MEDICATIONS  (STANDING):  aspirin enteric coated 81 milliGRAM(s) Oral daily  atorvastatin 80 milliGRAM(s) Oral at bedtime  coronavirus bivalent (EUA) Booster Vaccine (PFIZER) 0.3 milliLiter(s) IntraMuscular once  dextrose 5%. 1000 milliLiter(s) (100 mL/Hr) IV Continuous <Continuous>  dextrose 5%. 1000 milliLiter(s) (50 mL/Hr) IV Continuous <Continuous>  dextrose 50% Injectable 25 Gram(s) IV Push once  dextrose 50% Injectable 25 Gram(s) IV Push once  dextrose 50% Injectable 12.5 Gram(s) IV Push once  glucagon  Injectable 1 milliGRAM(s) IntraMuscular once  influenza   Vaccine 0.5 milliLiter(s) IntraMuscular once  insulin lispro (ADMELOG) corrective regimen sliding scale   SubCutaneous at bedtime  insulin lispro (ADMELOG) corrective regimen sliding scale   SubCutaneous three times a day before meals  metoprolol succinate ER 50 milliGRAM(s) Oral two times a day  sodium chloride 0.9%. 1000 milliLiter(s) (75 mL/Hr) IV Continuous <Continuous>    MEDICATIONS  (PRN):  acetaminophen     Tablet .. 650 milliGRAM(s) Oral every 6 hours PRN Temp greater or equal to 38C (100.4F), Mild Pain (1 - 3)  dextrose Oral Gel 15 Gram(s) Oral once PRN Blood Glucose LESS THAN 70 milliGRAM(s)/deciliter  melatonin 5 milliGRAM(s) Oral at bedtime PRN Insomnia      Vital Signs Last 24 Hrs  T(C): 36.6 (22 Mar 2023 11:04), Max: 37.5 (21 Mar 2023 20:06)  T(F): 97.9 (22 Mar 2023 11:04), Max: 99.5 (21 Mar 2023 20:06)  HR: 85 (22 Mar 2023 17:02) (71 - 87)  BP: 111/54 (22 Mar 2023 17:02) (108/70 - 117/74)  BP(mean): --  RR: 18 (22 Mar 2023 11:04) (16 - 18)  SpO2: 100% (22 Mar 2023 11:04) (98% - 100%)    Parameters below as of 22 Mar 2023 11:04  Patient On (Oxygen Delivery Method): room air        PHYSICAL EXAM:  GENERAL: NAD, well-developed  HEAD:  Atraumatic, Normocephalic  EYES: EOMI, PERRLA, conjunctiva and sclera clear  NECK: Supple, No JVD  CHEST/LUNG: Clear to auscultation bilaterally; No wheeze  HEART: Regular rate and rhythm; No murmurs, rubs, or gallops  ABDOMEN: Soft, Nontender, Nondistended; Bowel sounds present  EXTREMITIES:  2+ Peripheral Pulses, No clubbing, cyanosis, or edema  PSYCH: AAOx3  NEUROLOGY: non-focal  SKIN: No rashes or lesions    LABS:                        10.8   5.60  )-----------( 210      ( 22 Mar 2023 05:51 )             35.2     03-22    137  |  102  |  14  ----------------------------<  183<H>  4.0   |  23  |  0.52    Ca    9.1      22 Mar 2023 05:51      PT/INR - ( 20 Mar 2023 22:54 )   PT: 15.0 sec;   INR: 1.30 ratio         PTT - ( 20 Mar 2023 22:54 )  PTT:30.4 sec      Urinalysis Basic - ( 21 Mar 2023 06:14 )    Color: Yellow / Appearance: Clear / S.036 / pH: x  Gluc: x / Ketone: Negative  / Bili: Negative / Urobili: Negative   Blood: x / Protein: 30 mg/dL / Nitrite: Negative   Leuk Esterase: Negative / RBC: 17 /hpf / WBC 4 /HPF   Sq Epi: x / Non Sq Epi: 5 /hpf / Bacteria: Few          RADIOLOGY & ADDITIONAL TESTS:    Imaging Personally Reviewed:    Consultant(s) Notes Reviewed:      Care Discussed with Consultants/Other Providers:

## 2023-03-22 NOTE — PROGRESS NOTE ADULT - ASSESSMENT
Assessment and Recommendations:  47 year old F with history of DM2 with intermittent chest pain, found to have  of LAD. After discussion with CTs and interventional cardiology, decision made to undergo MIDCAB.    1) CAD ( of LAD)  - f/u Dr. Nix's team recs re: MIDCAB  - continue ASA 81mg and atorvastatin 80mg daily, goal LDL less than 70  - continue metop succ 50 bid for anti-anginal effect  - recommend outpatient f/u with Dr. Mandeep Dejesus MD  Cardiology Attending  [facility] [place] - Cardiology  [phone] [place] [address], [address] Floor, [address], [place] [address]  Office: [phone]  Fax:  [phone]    All Cardiology service information can be found 24/7 on [email or web address], password: [email or web address] Assessment and Recommendations:  47 year old F with history of DM2 with intermittent chest pain, found to have  of LAD. After discussion with CTs and interventional cardiology, decision made to undergo MIDCAB.    1) CAD ( of LAD)  - f/u Dr. Nix's team recs re: MIDCAB  - continue ASA 81mg and atorvastatin 80mg daily, goal LDL less than 70  - continue metop succ 50 bid for anti-anginal effect  - recommend outpatient f/u with Dr. Mandeep Gaston    Please call cardiology if any questions    Nj Dejesus MD  Cardiology Attending    All Cardiology service information can be found 24/7 on [email or web address], password: [email or web address]

## 2023-03-23 LAB
GLUCOSE BLDC GLUCOMTR-MCNC: 184 MG/DL — HIGH (ref 70–99)
GLUCOSE BLDC GLUCOMTR-MCNC: 202 MG/DL — HIGH (ref 70–99)
GLUCOSE BLDC GLUCOMTR-MCNC: 203 MG/DL — HIGH (ref 70–99)
GLUCOSE BLDC GLUCOMTR-MCNC: 238 MG/DL — HIGH (ref 70–99)

## 2023-03-23 RX ADMIN — Medication 2: at 17:22

## 2023-03-23 RX ADMIN — ATORVASTATIN CALCIUM 80 MILLIGRAM(S): 80 TABLET, FILM COATED ORAL at 21:38

## 2023-03-23 RX ADMIN — Medication 1: at 08:05

## 2023-03-23 RX ADMIN — Medication 2: at 13:19

## 2023-03-23 RX ADMIN — Medication 81 MILLIGRAM(S): at 10:38

## 2023-03-23 RX ADMIN — Medication 5 MILLIGRAM(S): at 21:37

## 2023-03-23 RX ADMIN — Medication 50 MILLIGRAM(S): at 05:02

## 2023-03-23 RX ADMIN — Medication 50 MILLIGRAM(S): at 17:29

## 2023-03-23 NOTE — PROGRESS NOTE ADULT - ASSESSMENT
47 f with    CAD/ Chest pain/ Abnormal CT coronaries    - telemetry  - ASA  - BB  - Cardiology evaluation   - cath high risk as per cardiology  - CT Surgery evaluation noted. . Awaiting CABG with dr Nix       Diabetes Mellitus  - BS control  - ADA diet     DVT prophylaxis  - low risk     d/w patient     Jan Mitchell MD phone [phone]

## 2023-03-23 NOTE — PROGRESS NOTE ADULT - SUBJECTIVE AND OBJECTIVE BOX
Patient is a 47y old  Female who presents with a chief complaint of CAD (22 Mar 2023 10:27)      SUBJECTIVE / OVERNIGHT EVENTS: occasional CP.  Review of Systems  palpitations no  sob no  nausea no  headache no    MEDICATIONS  (STANDING):  aspirin enteric coated 81 milliGRAM(s) Oral daily  atorvastatin 80 milliGRAM(s) Oral at bedtime  coronavirus bivalent (EUA) Booster Vaccine (PFIZER) 0.3 milliLiter(s) IntraMuscular once  dextrose 5%. 1000 milliLiter(s) (100 mL/Hr) IV Continuous <Continuous>  dextrose 5%. 1000 milliLiter(s) (50 mL/Hr) IV Continuous <Continuous>  dextrose 50% Injectable 25 Gram(s) IV Push once  dextrose 50% Injectable 25 Gram(s) IV Push once  dextrose 50% Injectable 12.5 Gram(s) IV Push once  glucagon  Injectable 1 milliGRAM(s) IntraMuscular once  influenza   Vaccine 0.5 milliLiter(s) IntraMuscular once  insulin lispro (ADMELOG) corrective regimen sliding scale   SubCutaneous at bedtime  insulin lispro (ADMELOG) corrective regimen sliding scale   SubCutaneous three times a day before meals  metoprolol succinate ER 50 milliGRAM(s) Oral two times a day  sodium chloride 0.9%. 1000 milliLiter(s) (75 mL/Hr) IV Continuous <Continuous>    MEDICATIONS  (PRN):  acetaminophen     Tablet .. 650 milliGRAM(s) Oral every 6 hours PRN Temp greater or equal to 38C (100.4F), Mild Pain (1 - 3)  dextrose Oral Gel 15 Gram(s) Oral once PRN Blood Glucose LESS THAN 70 milliGRAM(s)/deciliter  melatonin 5 milliGRAM(s) Oral at bedtime PRN Insomnia      Vital Signs Last 24 Hrs  T(C): 37.4 (23 Mar 2023 12:39), Max: 37.4 (23 Mar 2023 12:39)  T(F): 99.3 (23 Mar 2023 12:39), Max: 99.3 (23 Mar 2023 12:39)  HR: 85 (23 Mar 2023 17:28) (73 - 85)  BP: 118/78 (23 Mar 2023 17:28) (99/67 - 118/78)  BP(mean): --  RR: 18 (23 Mar 2023 17:28) (18 - 18)  SpO2: 99% (23 Mar 2023 17:28) (99% - 100%)    Parameters below as of 23 Mar 2023 17:28  Patient On (Oxygen Delivery Method): room air        PHYSICAL EXAM:  GENERAL: NAD, well-developed  HEAD:  Atraumatic, Normocephalic  EYES: EOMI, PERRLA, conjunctiva and sclera clear  NECK: Supple, No JVD  CHEST/LUNG: Clear to auscultation bilaterally; No wheeze  HEART: Regular rate and rhythm; No murmurs, rubs, or gallops  ABDOMEN: Soft, Nontender, Nondistended; Bowel sounds present  EXTREMITIES:  2+ Peripheral Pulses, No clubbing, cyanosis, or edema  PSYCH: AAOx3  NEUROLOGY: non-focal  SKIN: No rashes or lesions    LABS:                        10.8   5.60  )-----------( 210      ( 22 Mar 2023 05:51 )             35.2     03-22    137  |  102  |  14  ----------------------------<  183<H>  4.0   |  23  |  0.52    Ca    9.1      22 Mar 2023 05:51                  RADIOLOGY & ADDITIONAL TESTS:    Imaging Personally Reviewed:    Consultant(s) Notes Reviewed:      Care Discussed with Consultants/Other Providers:

## 2023-03-24 LAB
GLUCOSE BLDC GLUCOMTR-MCNC: 186 MG/DL — HIGH (ref 70–99)
GLUCOSE BLDC GLUCOMTR-MCNC: 187 MG/DL — HIGH (ref 70–99)
GLUCOSE BLDC GLUCOMTR-MCNC: 188 MG/DL — HIGH (ref 70–99)
GLUCOSE BLDC GLUCOMTR-MCNC: 217 MG/DL — HIGH (ref 70–99)

## 2023-03-24 RX ADMIN — Medication 50 MILLIGRAM(S): at 05:53

## 2023-03-24 RX ADMIN — Medication 2: at 12:28

## 2023-03-24 RX ADMIN — Medication 1: at 08:04

## 2023-03-24 RX ADMIN — Medication 5 MILLIGRAM(S): at 21:23

## 2023-03-24 RX ADMIN — Medication 81 MILLIGRAM(S): at 12:02

## 2023-03-24 RX ADMIN — ATORVASTATIN CALCIUM 80 MILLIGRAM(S): 80 TABLET, FILM COATED ORAL at 21:23

## 2023-03-24 RX ADMIN — Medication 50 MILLIGRAM(S): at 17:30

## 2023-03-24 RX ADMIN — Medication 1: at 17:25

## 2023-03-24 NOTE — PROGRESS NOTE ADULT - SUBJECTIVE AND OBJECTIVE BOX
Patient is a 47y old  Female who presents with a chief complaint of CAD (22 Mar 2023 10:27)      SUBJECTIVE / OVERNIGHT EVENTS: Comfortable without new complaints. Still with episodes of CP.   Review of Systems    palpitations no  sob no  nausea no  headache no    MEDICATIONS  (STANDING):  aspirin enteric coated 81 milliGRAM(s) Oral daily  atorvastatin 80 milliGRAM(s) Oral at bedtime  coronavirus bivalent (EUA) Booster Vaccine (PFIZER) 0.3 milliLiter(s) IntraMuscular once  dextrose 5%. 1000 milliLiter(s) (100 mL/Hr) IV Continuous <Continuous>  dextrose 5%. 1000 milliLiter(s) (50 mL/Hr) IV Continuous <Continuous>  dextrose 50% Injectable 25 Gram(s) IV Push once  dextrose 50% Injectable 25 Gram(s) IV Push once  dextrose 50% Injectable 12.5 Gram(s) IV Push once  glucagon  Injectable 1 milliGRAM(s) IntraMuscular once  influenza   Vaccine 0.5 milliLiter(s) IntraMuscular once  insulin lispro (ADMELOG) corrective regimen sliding scale   SubCutaneous at bedtime  insulin lispro (ADMELOG) corrective regimen sliding scale   SubCutaneous three times a day before meals  metoprolol succinate ER 50 milliGRAM(s) Oral two times a day  sodium chloride 0.9%. 1000 milliLiter(s) (75 mL/Hr) IV Continuous <Continuous>    MEDICATIONS  (PRN):  acetaminophen     Tablet .. 650 milliGRAM(s) Oral every 6 hours PRN Temp greater or equal to 38C (100.4F), Mild Pain (1 - 3)  dextrose Oral Gel 15 Gram(s) Oral once PRN Blood Glucose LESS THAN 70 milliGRAM(s)/deciliter  melatonin 5 milliGRAM(s) Oral at bedtime PRN Insomnia      Vital Signs Last 24 Hrs  T(C): 36.8 (24 Mar 2023 11:56), Max: 36.8 (24 Mar 2023 11:56)  T(F): 98.2 (24 Mar 2023 11:56), Max: 98.2 (24 Mar 2023 11:56)  HR: 85 (24 Mar 2023 17:30) (70 - 85)  BP: 112/72 (24 Mar 2023 17:30) (96/66 - 112/72)  BP(mean): --  RR: 16 (24 Mar 2023 11:56) (16 - 16)  SpO2: 99% (24 Mar 2023 11:56) (99% - 100%)    Parameters below as of 24 Mar 2023 11:56  Patient On (Oxygen Delivery Method): room air        PHYSICAL EXAM:  GENERAL: NAD, well-developed   HEAD:  Atraumatic, Normocephalic  EYES: EOMI, PERRLA, conjunctiva and sclera clear  NECK: Supple, No JVD  CHEST/LUNG: Clear to auscultation bilaterally; No wheeze  HEART: Regular rate and rhythm; No murmurs, rubs, or gallops  ABDOMEN: Soft, Nontender, Nondistended; Bowel sounds present  EXTREMITIES:  2+ Peripheral Pulses, No clubbing, cyanosis, or edema  PSYCH: AAOx3  NEUROLOGY: non-focal  SKIN: No rashes or lesions    LABS:              Telemetry SR        RADIOLOGY & ADDITIONAL TESTS:    Imaging Personally Reviewed:    Consultant(s) Notes Reviewed:      Care Discussed with Consultants/Other Providers:

## 2023-03-24 NOTE — PROGRESS NOTE ADULT - ASSESSMENT
47 f with    CAD/ Chest pain/ Abnormal CT coronaries    - telemetry  - ASA  - BB  - Cardiology evaluation   - cath high risk as per cardiology  - CT Surgery evaluation noted. . Awaiting CABG with dr Nix       Diabetes Mellitus  - BS control  - ADA diet     DVT prophylaxis  - low risk     d/w patient     Jan Mitchell MD phone [phone]

## 2023-03-25 LAB
GLUCOSE BLDC GLUCOMTR-MCNC: 146 MG/DL — HIGH (ref 70–99)
GLUCOSE BLDC GLUCOMTR-MCNC: 171 MG/DL — HIGH (ref 70–99)
GLUCOSE BLDC GLUCOMTR-MCNC: 174 MG/DL — HIGH (ref 70–99)
GLUCOSE BLDC GLUCOMTR-MCNC: 264 MG/DL — HIGH (ref 70–99)

## 2023-03-25 RX ADMIN — ATORVASTATIN CALCIUM 80 MILLIGRAM(S): 80 TABLET, FILM COATED ORAL at 22:59

## 2023-03-25 RX ADMIN — Medication 50 MILLIGRAM(S): at 05:48

## 2023-03-25 RX ADMIN — Medication 50 MILLIGRAM(S): at 17:53

## 2023-03-25 RX ADMIN — Medication 3: at 12:38

## 2023-03-25 RX ADMIN — Medication 5 MILLIGRAM(S): at 23:00

## 2023-03-25 RX ADMIN — Medication 81 MILLIGRAM(S): at 11:51

## 2023-03-25 RX ADMIN — Medication 1: at 08:38

## 2023-03-25 NOTE — PROGRESS NOTE ADULT - SUBJECTIVE AND OBJECTIVE BOX
Patient is a 47y old  Female who presents with a chief complaint of CAD (22 Mar 2023 10:27)      SUBJECTIVE / OVERNIGHT EVENTS: Comfortable without new complaints. Had few episodes of CP.  Review of Systems  palpitations no  sob no  nausea no  headache no    MEDICATIONS  (STANDING):  aspirin enteric coated 81 milliGRAM(s) Oral daily  atorvastatin 80 milliGRAM(s) Oral at bedtime  coronavirus bivalent (EUA) Booster Vaccine (PFIZER) 0.3 milliLiter(s) IntraMuscular once  dextrose 5%. 1000 milliLiter(s) (100 mL/Hr) IV Continuous <Continuous>  dextrose 5%. 1000 milliLiter(s) (50 mL/Hr) IV Continuous <Continuous>  dextrose 50% Injectable 25 Gram(s) IV Push once  dextrose 50% Injectable 25 Gram(s) IV Push once  dextrose 50% Injectable 12.5 Gram(s) IV Push once  glucagon  Injectable 1 milliGRAM(s) IntraMuscular once  influenza   Vaccine 0.5 milliLiter(s) IntraMuscular once  insulin lispro (ADMELOG) corrective regimen sliding scale   SubCutaneous at bedtime  insulin lispro (ADMELOG) corrective regimen sliding scale   SubCutaneous three times a day before meals  metoprolol succinate ER 50 milliGRAM(s) Oral two times a day  sodium chloride 0.9%. 1000 milliLiter(s) (75 mL/Hr) IV Continuous <Continuous>    MEDICATIONS  (PRN):  acetaminophen     Tablet .. 650 milliGRAM(s) Oral every 6 hours PRN Temp greater or equal to 38C (100.4F), Mild Pain (1 - 3)  dextrose Oral Gel 15 Gram(s) Oral once PRN Blood Glucose LESS THAN 70 milliGRAM(s)/deciliter  melatonin 5 milliGRAM(s) Oral at bedtime PRN Insomnia      Vital Signs Last 24 Hrs  T(C): 36.9 (25 Mar 2023 12:05), Max: 36.9 (24 Mar 2023 20:56)  T(F): 98.4 (25 Mar 2023 12:05), Max: 98.4 (24 Mar 2023 20:56)  HR: 75 (25 Mar 2023 12:05) (69 - 85)  BP: 108/72 (25 Mar 2023 12:05) (108/72 - 112/72)  BP(mean): --  RR: 17 (25 Mar 2023 12:05) (16 - 17)  SpO2: 100% (25 Mar 2023 12:05) (100% - 100%)    Parameters below as of 25 Mar 2023 12:05  Patient On (Oxygen Delivery Method): room air        PHYSICAL EXAM:  GENERAL: NAD, well-developed  HEAD:  Atraumatic, Normocephalic  EYES: EOMI, PERRLA, conjunctiva and sclera clear  NECK: Supple, No JVD  CHEST/LUNG: Clear to auscultation bilaterally; No wheeze  HEART: Regular rate and rhythm; No murmurs, rubs, or gallops  ABDOMEN: Soft, Nontender, Nondistended; Bowel sounds present  EXTREMITIES:  2+ Peripheral Pulses, No clubbing, cyanosis, or edema  PSYCH: AAOx3  NEUROLOGY: non-focal  SKIN: No rashes or lesions    LABS:      Telemetry SR No event.                RADIOLOGY & ADDITIONAL TESTS:    Imaging Personally Reviewed:    Consultant(s) Notes Reviewed:      Care Discussed with Consultants/Other Providers:

## 2023-03-25 NOTE — PROGRESS NOTE ADULT - ASSESSMENT
47 f with    CAD/ Chest pain/ Abnormal CT coronaries    - telemetry  - ASA  - BB  - Cardiology evaluation   - cath high risk as per cardiology  - CT Surgery evaluation noted. . Awaiting possible robotic CABG with dr Nix       Diabetes Mellitus  - BS control  - ADA diet     DVT prophylaxis  - low risk     d/w patient     Jan Mitchell MD phone [phone]

## 2023-03-26 LAB
GLUCOSE BLDC GLUCOMTR-MCNC: 178 MG/DL — HIGH (ref 70–99)
GLUCOSE BLDC GLUCOMTR-MCNC: 202 MG/DL — HIGH (ref 70–99)
GLUCOSE BLDC GLUCOMTR-MCNC: 235 MG/DL — HIGH (ref 70–99)
GLUCOSE BLDC GLUCOMTR-MCNC: 251 MG/DL — HIGH (ref 70–99)

## 2023-03-26 RX ADMIN — Medication 1: at 08:11

## 2023-03-26 RX ADMIN — Medication 81 MILLIGRAM(S): at 12:32

## 2023-03-26 RX ADMIN — Medication 50 MILLIGRAM(S): at 18:19

## 2023-03-26 RX ADMIN — ATORVASTATIN CALCIUM 80 MILLIGRAM(S): 80 TABLET, FILM COATED ORAL at 21:48

## 2023-03-26 RX ADMIN — Medication 50 MILLIGRAM(S): at 06:11

## 2023-03-26 RX ADMIN — Medication 1: at 22:10

## 2023-03-26 RX ADMIN — Medication 2: at 17:01

## 2023-03-26 RX ADMIN — Medication 2: at 12:32

## 2023-03-26 NOTE — PROGRESS NOTE ADULT - SUBJECTIVE AND OBJECTIVE BOX
Patient is a 47y old  Female who presents with a chief complaint of CAD (22 Mar 2023 10:27)      SUBJECTIVE / OVERNIGHT EVENTS: still with chest pain  Review of Systems  palpitations no  sob no  nausea no  headache no    MEDICATIONS  (STANDING):  aspirin enteric coated 81 milliGRAM(s) Oral daily  atorvastatin 80 milliGRAM(s) Oral at bedtime  coronavirus bivalent (EUA) Booster Vaccine (PFIZER) 0.3 milliLiter(s) IntraMuscular once  dextrose 5%. 1000 milliLiter(s) (100 mL/Hr) IV Continuous <Continuous>  dextrose 5%. 1000 milliLiter(s) (50 mL/Hr) IV Continuous <Continuous>  dextrose 50% Injectable 25 Gram(s) IV Push once  dextrose 50% Injectable 25 Gram(s) IV Push once  dextrose 50% Injectable 12.5 Gram(s) IV Push once  glucagon  Injectable 1 milliGRAM(s) IntraMuscular once  influenza   Vaccine 0.5 milliLiter(s) IntraMuscular once  insulin lispro (ADMELOG) corrective regimen sliding scale   SubCutaneous at bedtime  insulin lispro (ADMELOG) corrective regimen sliding scale   SubCutaneous three times a day before meals  metoprolol succinate ER 50 milliGRAM(s) Oral two times a day  sodium chloride 0.9%. 1000 milliLiter(s) (75 mL/Hr) IV Continuous <Continuous>    MEDICATIONS  (PRN):  acetaminophen     Tablet .. 650 milliGRAM(s) Oral every 6 hours PRN Temp greater or equal to 38C (100.4F), Mild Pain (1 - 3)  dextrose Oral Gel 15 Gram(s) Oral once PRN Blood Glucose LESS THAN 70 milliGRAM(s)/deciliter  melatonin 5 milliGRAM(s) Oral at bedtime PRN Insomnia      Vital Signs Last 24 Hrs  T(C): 36.5 (26 Mar 2023 04:26), Max: 36.9 (25 Mar 2023 12:05)  T(F): 97.7 (26 Mar 2023 04:26), Max: 98.4 (25 Mar 2023 12:05)  HR: 70 (26 Mar 2023 04:26) (70 - 75)  BP: 104/70 (26 Mar 2023 04:26) (104/70 - 108/72)  BP(mean): --  RR: 18 (26 Mar 2023 04:26) (17 - 18)  SpO2: 100% (26 Mar 2023 04:26) (100% - 100%)    Parameters below as of 26 Mar 2023 04:26  Patient On (Oxygen Delivery Method): room air        PHYSICAL EXAM:  GENERAL: NAD, well-developed  HEAD:  Atraumatic, Normocephalic  EYES: EOMI, PERRLA, conjunctiva and sclera clear  NECK: Supple, No JVD  CHEST/LUNG: Clear to auscultation bilaterally; No wheeze  HEART: Regular rate and rhythm; No murmurs, rubs, or gallops  ABDOMEN: Soft, Nontender, Nondistended; Bowel sounds present  EXTREMITIES:  2+ Peripheral Pulses, No clubbing, cyanosis, or edema  PSYCH: AAOx3  NEUROLOGY: non-focal  SKIN: No rashes or lesions    LABS:                      RADIOLOGY & ADDITIONAL TESTS:    Imaging Personally Reviewed:    Consultant(s) Notes Reviewed:      Care Discussed with Consultants/Other Providers:

## 2023-03-26 NOTE — PROGRESS NOTE ADULT - ASSESSMENT
47 f with    CAD/ Chest pain/ Abnormal CT coronaries    - telemetry  - ASA  - BB  - Cardiology evaluation   - cath high risk as per cardiology  - CT Surgery evaluation noted. . Awaiting possible robotic CABG with dr Nix       Diabetes Mellitus  - BS control  - ADA diet     DVT prophylaxis  - low risk     d/w patient     Jan Mitchell MD phone [phone]

## 2023-03-27 ENCOUNTER — TRANSCRIPTION ENCOUNTER (OUTPATIENT)
Age: 48
End: 2023-03-27

## 2023-03-27 LAB
BLD GP AB SCN SERPL QL: NEGATIVE — SIGNIFICANT CHANGE UP
GLUCOSE BLDC GLUCOMTR-MCNC: 197 MG/DL — HIGH (ref 70–99)
GLUCOSE BLDC GLUCOMTR-MCNC: 214 MG/DL — HIGH (ref 70–99)
GLUCOSE BLDC GLUCOMTR-MCNC: 220 MG/DL — HIGH (ref 70–99)
GLUCOSE BLDC GLUCOMTR-MCNC: 235 MG/DL — HIGH (ref 70–99)
RH IG SCN BLD-IMP: POSITIVE — SIGNIFICANT CHANGE UP
SARS-COV-2 RNA SPEC QL NAA+PROBE: SIGNIFICANT CHANGE UP

## 2023-03-27 PROCEDURE — 99233 SBSQ HOSP IP/OBS HIGH 50: CPT

## 2023-03-27 RX ORDER — GABAPENTIN 400 MG/1
200 CAPSULE ORAL ONCE
Refills: 0 | Status: COMPLETED | OUTPATIENT
Start: 2023-03-27 | End: 2023-03-28

## 2023-03-27 RX ORDER — CEFUROXIME AXETIL 250 MG
1500 TABLET ORAL ONCE
Refills: 0 | Status: DISCONTINUED | OUTPATIENT
Start: 2023-03-27 | End: 2023-03-28

## 2023-03-27 RX ORDER — CHLORHEXIDINE GLUCONATE 213 G/1000ML
1 SOLUTION TOPICAL ONCE
Refills: 0 | Status: COMPLETED | OUTPATIENT
Start: 2023-03-27 | End: 2023-03-27

## 2023-03-27 RX ORDER — CHLORHEXIDINE GLUCONATE 213 G/1000ML
30 SOLUTION TOPICAL ONCE
Refills: 0 | Status: COMPLETED | OUTPATIENT
Start: 2023-03-27 | End: 2023-03-28

## 2023-03-27 RX ORDER — ASCORBIC ACID 60 MG
2000 TABLET,CHEWABLE ORAL ONCE
Refills: 0 | Status: COMPLETED | OUTPATIENT
Start: 2023-03-27 | End: 2023-03-28

## 2023-03-27 RX ORDER — ACETAMINOPHEN 500 MG
1000 TABLET ORAL ONCE
Refills: 0 | Status: COMPLETED | OUTPATIENT
Start: 2023-03-27 | End: 2023-03-28

## 2023-03-27 RX ADMIN — Medication 81 MILLIGRAM(S): at 11:46

## 2023-03-27 RX ADMIN — Medication 50 MILLIGRAM(S): at 18:31

## 2023-03-27 RX ADMIN — ATORVASTATIN CALCIUM 80 MILLIGRAM(S): 80 TABLET, FILM COATED ORAL at 22:07

## 2023-03-27 RX ADMIN — Medication 2: at 08:40

## 2023-03-27 RX ADMIN — CHLORHEXIDINE GLUCONATE 1 APPLICATION(S): 213 SOLUTION TOPICAL at 22:06

## 2023-03-27 RX ADMIN — Medication 2: at 13:15

## 2023-03-27 RX ADMIN — Medication 5 MILLIGRAM(S): at 22:12

## 2023-03-27 RX ADMIN — Medication 1: at 17:29

## 2023-03-27 RX ADMIN — Medication 50 MILLIGRAM(S): at 05:02

## 2023-03-27 NOTE — PROGRESS NOTE ADULT - ASSESSMENT
This is a 46y/o Female with PMHx of DM2 on metformin at home who presented to the ED yesterday with c/o HA and intermittent left sided CP over the last couple days. Pt states she had episodes of CP for several years in her home country [facility] but her ECG's were normal so no further evaluation was done. CTA coronaries performed which showed a severe obstruction of the pLAD. Pt now s/p LHC demonstrating 100% stenosis in ostial LAD and CT Surgery consulted for CABG evaluation.

## 2023-03-27 NOTE — PROGRESS NOTE ADULT - SUBJECTIVE AND OBJECTIVE BOX
Cardiac Surgery Pre-op Note:    CC: Patient is a 47y old  Female who presents with a chief complaint of CAD (22 Mar 2023 10:27)      Referring Physician:                                                                                                           Surgeon: Dr. Nix    Procedure: (Date) (Procedure) 3/28 CABG    Allergies    No Known Allergies    Intolerances        HPI:  47yoF presenting to the ED yesterday with a HA over last couple days.   During her evaluation she noted that she has had several episodes of intermittent left sided CP over the last couple days. States that it is exertional in nature and radiates to her left arm. States that she can walk about 1/2block before she starts to have symptoms. Prior to this she reports that she had a couple short lived episodes of CP a while ago in [place] country but her ECG's were normal so no further evaluation was done. She denies LE edema, orthopnea, PND.   Her ECG was NSR without ischemic findings and her Trop T was <6.   As a result she was ordered for a CTa coronaries which is concerning for a severe obstruction of the pLAD.  (18 Mar 2023 20:02)      PAST MEDICAL & SURGICAL HISTORY:  DM (diabetes mellitus)  On metformin      COVID-19 vaccine series completed      H/O  section          MEDICATIONS  (STANDING):  acetaminophen     Tablet .. 1000 milliGRAM(s) Oral once  ascorbic acid 2000 milliGRAM(s) Oral once  aspirin enteric coated 81 milliGRAM(s) Oral daily  atorvastatin 80 milliGRAM(s) Oral at bedtime  cefuroxime  IVPB 1500 milliGRAM(s) IV Intermittent once  chlorhexidine 0.12% Liquid 30 milliLiter(s) Swish and Spit once  chlorhexidine 4% Liquid 1 Application(s) Topical once  coronavirus bivalent (EUA) Booster Vaccine (PFIZER) 0.3 milliLiter(s) IntraMuscular once  dextrose 5%. 1000 milliLiter(s) (100 mL/Hr) IV Continuous <Continuous>  dextrose 5%. 1000 milliLiter(s) (50 mL/Hr) IV Continuous <Continuous>  dextrose 50% Injectable 25 Gram(s) IV Push once  dextrose 50% Injectable 25 Gram(s) IV Push once  dextrose 50% Injectable 12.5 Gram(s) IV Push once  gabapentin 200 milliGRAM(s) Oral once  glucagon  Injectable 1 milliGRAM(s) IntraMuscular once  influenza   Vaccine 0.5 milliLiter(s) IntraMuscular once  insulin lispro (ADMELOG) corrective regimen sliding scale   SubCutaneous at bedtime  insulin lispro (ADMELOG) corrective regimen sliding scale   SubCutaneous three times a day before meals  metoprolol succinate ER 50 milliGRAM(s) Oral two times a day  sodium chloride 0.9%. 1000 milliLiter(s) (75 mL/Hr) IV Continuous <Continuous>    MEDICATIONS  (PRN):  acetaminophen     Tablet .. 650 milliGRAM(s) Oral every 6 hours PRN Temp greater or equal to 38C (100.4F), Mild Pain (1 - 3)  dextrose Oral Gel 15 Gram(s) Oral once PRN Blood Glucose LESS THAN 70 milliGRAM(s)/deciliter  melatonin 5 milliGRAM(s) Oral at bedtime PRN Insomni      Labs:      Blood Type:   HGB A1C:   Prealbumin:   Pro-BNP:   Thyroid Panel:  @ 22:54/4.37  --/    MRSA:  / MSSA: Negative     CXR:   < from: Xray Chest 2 Views PA/Lat (23 @ 22:41) >  EXAM:  Frontal and lateral chest from 3/17/2023 at 2237. No similar prior   studies available for comparison.    FINDINGS:  Heart size is normal.  Clear lungs.  No pneumothorax or pleural effusions.    Impression:  Clear lungs.    --- End of Report ---    < end of copied text >    EKG:    Carotid Duplex:    < from: VA Duplex Tashi Maravilla (23 @ 11:57) >  INTERPRETATION:  CLINICAL INFORMATION: Preop CABG    COMPARISON: None available.    TECHNIQUE: Grayscale, color and spectral Doppler examination of both   carotid arteries was performed.    FINDINGS:    Minimal atheromatous intimal thickening is present along the course of   the carotid arteries in the neck.    No elevated velocities or abnormal waveforms are encountered.    Peak systolic velocities are as follows:    RIGHT:  PROX CCA = 81  DIST CCA = 71  PROX ICA = 103  DIST ICA = 81  ECA = 135    LEFT:  PROX CCA = 106  DIST CCA = 108  PROX ICA = 79  DIST ICA = 50  ECA = 86    Antegrade flow is noted within both vertebral arteries.    IMPRESSION: No significant hemodynamic stenosis of either carotid artery.    Measurement of carotid stenosis is based on velocity parameters that   correlate the residual internal carotid diameter with that of the more   distal vessel in accordance with a method such as the [place] American   Symptomatic Carotid Endarterectomy Trial (NASCET).    --- End of Report ---    < end of copied text >    PFT's:    Echocardiogram:  < from: Transthoracic Echocardiogram (23 @ 11:50) >  PROCEDURE: Transthoracic echocardiogram with 2-D, M-Mode  and complete spectral and color flow Doppler. Strain  Imaging.  INDICATION: Chest pain, unspecified (R07.9)  ------------------------------------------------------------------------  Dimensions:    Normal Values:  LA:     3.7    2.0 - 4.0 cm  Ao:     3.1    2.0 - 3.8 cm  SEPTUM: 0.6    0.6 - 1.2 cm  PWT:    0.7    0.6 - 1.1 cm  LVIDd:  4.8    3.0 - 5.6 cm  LVIDs:  3.0    1.8 - 4.0 cm  Derived variables:  LVMI: 52 g/m2  RWT: 0.29  Fractional short: 38 %  EF (Wayne Rule): 61 %Doppler Peak Velocity (m/sec):  AoV=1.2  ------------------------------------------------------------------------  Observations:  Mitral Valve: Normal mitral valve.  Aortic Valve/Aorta: Normal trileaflet aortic valve. Peak  transaortic valve gradient equals 6 mm Hg. Peak left  ventricular outflow tract gradient equals 3 mm Hg, mean  gradient is equal to 2 mm Hg, LVOT velocity time integral  equals 18 cm.  Aortic Root: 3.1 cm.  LVOT diameter: 2 cm.  Left Atrium: Normal left atrium.  LA volume index = 27  cc/m2.  Left Ventricle: Normal left ventricular systolic function.  No segmental wall motion abnormalities. Normal left  ventricular internal dimensions and wall thicknesses.  Right Heart: Normal right atrium. Normal right ventricular  size and function. Normal tricuspid valve. Minimal  tricuspid regurgitation. Normal pulmonic valve. Minimal  pulmonic regurgitation.  Pericardium/Pleura: Normal pericardium with no pericardial  effusion.  Hemodynamic: Estimated right atrial pressure is 8 mm Hg.  Estimated right ventricular systolic pressure equals 24 mm  Hg, assuming right atrial pressure equals 8 mm Hg,  consistent with normal pulmonary pressures.  ------------------------------------------------------------------------  Conclusions:  1. Normal left ventricular internal dimensions and wall  thicknesses.  2. Normal left ventricular systolic function. No segmental  wall motion abnormalities.  3. Strain imaging was performed with a HR of bpm 85 and a  BP of 104/57. Global L. Strain= -18.7%.  4. Normal right ventricular size and function.  5. Estimated pulmonary artery systolic pressure equals 24  mm Hg, assuming right atrial pressure equals 8  mm Hg,  consistent with normal pulmonary pressures.  ------------------------------------------------------------------------  Confirmed on  3/20/2023 - 19:36:59 by PORSCHE Cadena    < end of copied text >    Cardiac catheterization:  < from: Cardiac Catheterization (23 @ 10:40) >  Cath Lab Report    Diagnostic Cardiologist:       Lauren Colbert MD   Fellow:                        Nitza Rodriguez   Referring Physician:           Yony Gaston MD   Referring Physician:           Vinicius Guerra MD     Procedures Performed   Procedures:               1.    Arterial Access - Right Radial     2.    Diagnostic Coronary Angiography   3.    Left Heart Cath     Indications:               Stable angina     Diagnostic Conclusions:     100% pLAD  with R to L collaterals. Mild atherosclerosis in Lcx and  RCA.    Recommendations:     Consider CTS evaluation for single vessel bypass surgery (LIMA to  LAD).  Optimize cardiac medications.    Lifestyle modification to include weight loss, diet and exercise.    Goal LDL < 50.      Presentation:   46 YO female with DM2, and intermittent chest pain ongoing for a  couple of months, s/p CTA showing  of  the LAD w/ R to L collaterals presents for [facility] for ischemic  evaluation.    Procedure Narrative:   The risks and alternatives of the procedures and conscious sedation  were explained to the patient and informed consent was  obtained. The patient was brought to the cath lab and placed on the  exam table.  Access   Right radial artery:   The puncture site was infiltrated with 1% Lidocaine. Vascular access  was obtained using modified seldinger technique.  Hemostasis/Sheath Status: Hemostasis was successful using mechanical  compression.    Diagnostic Findings:     Coronary Angiography   LM   Left main artery: Angiography shows minor irregularities.      Patient: RAGHAV LAINEZ   MRN: [id number]  Study Date: 2023   10:40 AM      Page 1 of 3          LAD   Proximal left anterior descending: moderate R -> L collaterals. .  There is a 100 % stenosis in the proximal third portion of the  segment. This lesion is a chronic total occlusion.      CX   Circumflex: Angiography shows mild atherosclerosis.      RCA   Right coronary artery: Angiography shows mild atherosclerosis.      History and Risk Factors:   Diabetes:                                Yes     < end of copied text >    Vein Mapping:    Gen: WN/WD NAD  Neuro: AAOx3, nonfocal  Pulm: CTA B/L  CV: RRR, S1S2  Abd: Soft, NT, ND +BS  Ext: No edema, + peripheral pulses      Pt has AICD/PPM [ ] Yes  [ X] No             Brand Name:  Pre-op Beta Blocker ordered within 24 hrs of surgery (CABG ONLY)?  [ X] Yes  [ ] No  If not, Why?  Type & Cross  [ X] Yes  [ ] No  NPO after Midnight [ X] Yes  [ ] No  Pre-op ABX ordered, to be taped on chart:  [ X] Yes  [ ] No     Hibiclens/Peridex ordered [ X] Yes  [ ] No  Intraop on Hold: PRBCs, CXR, DAVINA [X ]   Consent obtained  [X ] Yes  [ ] No

## 2023-03-27 NOTE — PROGRESS NOTE ADULT - SUBJECTIVE AND OBJECTIVE BOX
SUBJ: intermittent CP,     MEDICATIONS  (STANDING):  acetaminophen     Tablet .. 1000 milliGRAM(s) Oral once  ascorbic acid 2000 milliGRAM(s) Oral once  aspirin enteric coated 81 milliGRAM(s) Oral daily  atorvastatin 80 milliGRAM(s) Oral at bedtime  cefuroxime  IVPB 1500 milliGRAM(s) IV Intermittent once  chlorhexidine 0.12% Liquid 30 milliLiter(s) Swish and Spit once  chlorhexidine 4% Liquid 1 Application(s) Topical once  coronavirus bivalent (EUA) Booster Vaccine (PFIZER) 0.3 milliLiter(s) IntraMuscular once  dextrose 5%. 1000 milliLiter(s) (100 mL/Hr) IV Continuous <Continuous>  dextrose 5%. 1000 milliLiter(s) (50 mL/Hr) IV Continuous <Continuous>  dextrose 50% Injectable 25 Gram(s) IV Push once  dextrose 50% Injectable 25 Gram(s) IV Push once  dextrose 50% Injectable 12.5 Gram(s) IV Push once  gabapentin 200 milliGRAM(s) Oral once  glucagon  Injectable 1 milliGRAM(s) IntraMuscular once  influenza   Vaccine 0.5 milliLiter(s) IntraMuscular once  insulin lispro (ADMELOG) corrective regimen sliding scale   SubCutaneous at bedtime  insulin lispro (ADMELOG) corrective regimen sliding scale   SubCutaneous three times a day before meals  metoprolol succinate ER 50 milliGRAM(s) Oral two times a day  sodium chloride 0.9%. 1000 milliLiter(s) (75 mL/Hr) IV Continuous <Continuous>    MEDICATIONS  (PRN):  acetaminophen     Tablet .. 650 milliGRAM(s) Oral every 6 hours PRN Temp greater or equal to 38C (100.4F), Mild Pain (1 - 3)  dextrose Oral Gel 15 Gram(s) Oral once PRN Blood Glucose LESS THAN 70 milliGRAM(s)/deciliter  melatonin 5 milliGRAM(s) Oral at bedtime PRN Insomnia      Vital Signs Last 24 Hrs  T(C): 36.8 (27 Mar 2023 11:21), Max: 36.8 (27 Mar 2023 11:21)  T(F): 98.2 (27 Mar 2023 11:21), Max: 98.2 (27 Mar 2023 11:21)  HR: 73 (27 Mar 2023 11:21) (61 - 79)  BP: 106/70 (27 Mar 2023 11:21) (106/70 - 111/75)  BP(mean): --  RR: 17 (27 Mar 2023 11:21) (16 - 18)  SpO2: 100% (27 Mar 2023 11:21) (98% - 100%)    Parameters below as of 27 Mar 2023 11:21  Patient On (Oxygen Delivery Method): room air        REVIEW OF SYSTEMS:  CONSTITUTIONAL: No fever, weight loss, or fatigue  EYES: No eye pain, visual disturbances, or discharge  ENMT:  No difficulty hearing, tinnitus, vertigo; No sinus or throat pain  NECK: No pain or stiffness  RESPIRATORY: No cough, wheezing, chills or hemoptysis; No shortness of breath  CARDIOVASCULAR: +chest pain, no palpitations, dizziness, or leg swelling  GASTROINTESTINAL: No abdominal or epigastric pain. No nausea, vomiting, or hematemesis; No diarrhea or constipation. No melena or hematochezia.  GENITOURINARY: No dysuria, frequency, hematuria, or incontinence  NEUROLOGICAL: No headaches, memory loss, loss of strength, numbness, or tremors  SKIN: No itching, burning, rashes, or lesions   LYMPH NODES: No enlarged glands  ENDOCRINE: No heat or cold intolerance; No hair loss  MUSCULOSKELETAL: No joint pain or swelling; No muscle, back, or extremity pain  PSYCHIATRIC: No depression, anxiety, mood swings, or difficulty sleeping  HEME/LYMPH: No easy bruising, or bleeding gums  ALLERY AND IMMUNOLOGIC: No hives or eczema          PHYSICAL EXAM:  · CONSTITUTIONAL: Well-developed, well nourished      · EYES: EOMI; PERRL; no drainage or redness  · NECK: No bruits; no thyromegaly or nodules  ·RESPIRATORY:   airway patent; breath sounds equal; good air movement; respirations non-labored; clear to auscultation bilaterally; no chest wall tenderness; no intercostal retractions; no rales,rhonchi or wheeze  · CARDIOVASCULAR: regular rate and rhythm  no rub  no murmur  normal PMI  . GASTROINTESTINAL:  no distention; no masses palpable; bowel sounds normal  · EXTREMITIES: No cyanosis, clubbing or edema  · VASCULAR:  Equal and normal pulses (radial, femoral)  ·NEUROLOGICAL:  Alert and oriented x 3; sensation intact; deep reflexes intact; cranial nerves intact; no spontaneous movement; superficial reflexes intact; normal strength  · SKIN: No lesions; no rash  . LYMPH NODES: No lymphadedenopathy  · MUSCULOSKELETAL:  No calf tenderness  no joint swelling	    TELEMETRY: no events    ECG:    TTE:    LABS:                     RADIOLOGY & ADDITIONAL STUDIES:    IMPRESSION AND PLAN:  RAGHAV LAINEZ is a 46y/o Female with PMHx of DM2 on metformin who presentd with CP found to have severe obstruction of the pLAD. Pt now s/p LHC demonstrating 100% stenosis in ostial LAD    for upcoming CABG OR 3/28    CAD  for CABG  early onset CAD  LDL not at level consistent with familial hyperlipidemia  check LPa  continue   aspirin enteric coated 81 milliGRAM(s) Oral daily  atorvastatin 80 milliGRAM(s) Oral at bedtime   metoprolol succinate ER 50 milliGRAM(s) Oral two times a day    She will need an outpt cardiologist in [place]    Gustavo Espitia MD, PhD  Cardiology Attending  [facility]/ [facility]    For day time coverage Mon-Fri see Non-Service Consult Attending on [email or web address], password: [email or web address]; daytime weekends covered by general cardiology consult service attending.)

## 2023-03-27 NOTE — PROGRESS NOTE ADULT - ASSESSMENT
47 f with    CAD/ Chest pain/ Abnormal CT coronaries    - telemetry  - ASA  - BB  - Cardiology evaluation   - cath high risk as per cardiology  - CT Surgery evaluation noted. . Awaiting possible robotic CABG with dr Nix  in am      Diabetes Mellitus  - BS control  - ADA diet     DVT prophylaxis  - low risk     d/w patient     Jan Mitchell MD phone [phone]

## 2023-03-27 NOTE — PROGRESS NOTE ADULT - SUBJECTIVE AND OBJECTIVE BOX
Patient is a 47y old  Female who presents with a chief complaint of preop cabg (27 Mar 2023 11:57)      SUBJECTIVE / OVERNIGHT EVENTS: still with occasional CP  Review of Systems    palpitations no  sob no  nausea no  headache no    MEDICATIONS  (STANDING):  acetaminophen     Tablet .. 1000 milliGRAM(s) Oral once  ascorbic acid 2000 milliGRAM(s) Oral once  aspirin enteric coated 81 milliGRAM(s) Oral daily  atorvastatin 80 milliGRAM(s) Oral at bedtime  cefuroxime  IVPB 1500 milliGRAM(s) IV Intermittent once  chlorhexidine 0.12% Liquid 30 milliLiter(s) Swish and Spit once  chlorhexidine 4% Liquid 1 Application(s) Topical once  coronavirus bivalent (EUA) Booster Vaccine (PFIZER) 0.3 milliLiter(s) IntraMuscular once  dextrose 5%. 1000 milliLiter(s) (100 mL/Hr) IV Continuous <Continuous>  dextrose 5%. 1000 milliLiter(s) (50 mL/Hr) IV Continuous <Continuous>  dextrose 50% Injectable 25 Gram(s) IV Push once  dextrose 50% Injectable 25 Gram(s) IV Push once  dextrose 50% Injectable 12.5 Gram(s) IV Push once  gabapentin 200 milliGRAM(s) Oral once  glucagon  Injectable 1 milliGRAM(s) IntraMuscular once  influenza   Vaccine 0.5 milliLiter(s) IntraMuscular once  insulin lispro (ADMELOG) corrective regimen sliding scale   SubCutaneous at bedtime  insulin lispro (ADMELOG) corrective regimen sliding scale   SubCutaneous three times a day before meals  metoprolol succinate ER 50 milliGRAM(s) Oral two times a day  sodium chloride 0.9%. 1000 milliLiter(s) (75 mL/Hr) IV Continuous <Continuous>    MEDICATIONS  (PRN):  acetaminophen     Tablet .. 650 milliGRAM(s) Oral every 6 hours PRN Temp greater or equal to 38C (100.4F), Mild Pain (1 - 3)  dextrose Oral Gel 15 Gram(s) Oral once PRN Blood Glucose LESS THAN 70 milliGRAM(s)/deciliter  melatonin 5 milliGRAM(s) Oral at bedtime PRN Insomnia      Vital Signs Last 24 Hrs  T(C): 36.7 (27 Mar 2023 20:02), Max: 36.8 (27 Mar 2023 11:21)  T(F): 98.1 (27 Mar 2023 20:02), Max: 98.2 (27 Mar 2023 11:21)  HR: 71 (27 Mar 2023 20:02) (61 - 73)  BP: 124/76 (27 Mar 2023 20:02) (106/70 - 124/76)  BP(mean): --  RR: 18 (27 Mar 2023 20:02) (16 - 18)  SpO2: 100% (27 Mar 2023 20:02) (98% - 100%)    Parameters below as of 27 Mar 2023 20:02  Patient On (Oxygen Delivery Method): room air        PHYSICAL EXAM:  GENERAL: NAD, well-developed  HEAD:  Atraumatic, Normocephalic  EYES: EOMI, PERRLA, conjunctiva and sclera clear  NECK: Supple, No JVD  CHEST/LUNG: Clear to auscultation bilaterally; No wheeze  HEART: Regular rate and rhythm; No murmurs, rubs, or gallops  ABDOMEN: Soft, Nontender, Nondistended; Bowel sounds present  EXTREMITIES:  2+ Peripheral Pulses, No clubbing, cyanosis, or edema  PSYCH: AAOx3  NEUROLOGY: non-focal  SKIN: No rashes or lesions    LABS:                      RADIOLOGY & ADDITIONAL TESTS:    Imaging Personally Reviewed:    Consultant(s) Notes Reviewed:      Care Discussed with Consultants/Other Providers:

## 2023-03-28 ENCOUNTER — APPOINTMENT (OUTPATIENT)
Dept: CARDIOTHORACIC SURGERY | Facility: HOSPITAL | Age: 48
End: 2023-03-28

## 2023-03-28 LAB
ALBUMIN SERPL ELPH-MCNC: 3.7 G/DL — SIGNIFICANT CHANGE UP (ref 3.3–5)
ALBUMIN SERPL ELPH-MCNC: 3.8 G/DL — SIGNIFICANT CHANGE UP (ref 3.3–5)
ALP SERPL-CCNC: 66 U/L — SIGNIFICANT CHANGE UP (ref 40–120)
ALP SERPL-CCNC: 73 U/L — SIGNIFICANT CHANGE UP (ref 40–120)
ALT FLD-CCNC: 17 U/L — SIGNIFICANT CHANGE UP (ref 10–45)
ALT FLD-CCNC: 18 U/L — SIGNIFICANT CHANGE UP (ref 10–45)
ANION GAP SERPL CALC-SCNC: 10 MMOL/L — SIGNIFICANT CHANGE UP (ref 5–17)
ANION GAP SERPL CALC-SCNC: 11 MMOL/L — SIGNIFICANT CHANGE UP (ref 5–17)
ANISOCYTOSIS BLD QL: SLIGHT — SIGNIFICANT CHANGE UP
APTT BLD: 29.7 SEC — SIGNIFICANT CHANGE UP (ref 27.5–35.5)
APTT BLD: 29.9 SEC — SIGNIFICANT CHANGE UP (ref 27.5–35.5)
AST SERPL-CCNC: 13 U/L — SIGNIFICANT CHANGE UP (ref 10–40)
AST SERPL-CCNC: 15 U/L — SIGNIFICANT CHANGE UP (ref 10–40)
BASOPHILS # BLD AUTO: 0 K/UL — SIGNIFICANT CHANGE UP (ref 0–0.2)
BASOPHILS NFR BLD AUTO: 0 % — SIGNIFICANT CHANGE UP (ref 0–2)
BILIRUB SERPL-MCNC: 0.5 MG/DL — SIGNIFICANT CHANGE UP (ref 0.2–1.2)
BILIRUB SERPL-MCNC: 0.7 MG/DL — SIGNIFICANT CHANGE UP (ref 0.2–1.2)
BUN SERPL-MCNC: 11 MG/DL — SIGNIFICANT CHANGE UP (ref 7–23)
BUN SERPL-MCNC: 12 MG/DL — SIGNIFICANT CHANGE UP (ref 7–23)
CALCIUM SERPL-MCNC: 8.9 MG/DL — SIGNIFICANT CHANGE UP (ref 8.4–10.5)
CALCIUM SERPL-MCNC: 9.3 MG/DL — SIGNIFICANT CHANGE UP (ref 8.4–10.5)
CHLORIDE SERPL-SCNC: 102 MMOL/L — SIGNIFICANT CHANGE UP (ref 96–108)
CHLORIDE SERPL-SCNC: 103 MMOL/L — SIGNIFICANT CHANGE UP (ref 96–108)
CK MB BLD-MCNC: 1.1 % — SIGNIFICANT CHANGE UP (ref 0–3.5)
CK MB CFR SERPL CALC: 2.2 NG/ML — SIGNIFICANT CHANGE UP (ref 0–3.8)
CK SERPL-CCNC: 192 U/L — HIGH (ref 25–170)
CO2 SERPL-SCNC: 25 MMOL/L — SIGNIFICANT CHANGE UP (ref 22–31)
CO2 SERPL-SCNC: 26 MMOL/L — SIGNIFICANT CHANGE UP (ref 22–31)
CREAT SERPL-MCNC: 0.45 MG/DL — LOW (ref 0.5–1.3)
CREAT SERPL-MCNC: 0.55 MG/DL — SIGNIFICANT CHANGE UP (ref 0.5–1.3)
EGFR: 115 ML/MIN/1.73M2 — SIGNIFICANT CHANGE UP
EGFR: 121 ML/MIN/1.73M2 — SIGNIFICANT CHANGE UP
EOSINOPHIL # BLD AUTO: 0.42 K/UL — SIGNIFICANT CHANGE UP (ref 0–0.5)
EOSINOPHIL NFR BLD AUTO: 1.7 % — SIGNIFICANT CHANGE UP (ref 0–6)
FIBRINOGEN PPP-MCNC: 320 MG/DL — SIGNIFICANT CHANGE UP (ref 200–445)
GAS PNL BLDA: SIGNIFICANT CHANGE UP
GLUCOSE BLDC GLUCOMTR-MCNC: 101 MG/DL — HIGH (ref 70–99)
GLUCOSE BLDC GLUCOMTR-MCNC: 101 MG/DL — HIGH (ref 70–99)
GLUCOSE BLDC GLUCOMTR-MCNC: 107 MG/DL — HIGH (ref 70–99)
GLUCOSE BLDC GLUCOMTR-MCNC: 117 MG/DL — HIGH (ref 70–99)
GLUCOSE BLDC GLUCOMTR-MCNC: 117 MG/DL — HIGH (ref 70–99)
GLUCOSE BLDC GLUCOMTR-MCNC: 119 MG/DL — HIGH (ref 70–99)
GLUCOSE BLDC GLUCOMTR-MCNC: 121 MG/DL — HIGH (ref 70–99)
GLUCOSE BLDC GLUCOMTR-MCNC: 121 MG/DL — HIGH (ref 70–99)
GLUCOSE BLDC GLUCOMTR-MCNC: 123 MG/DL — HIGH (ref 70–99)
GLUCOSE SERPL-MCNC: 147 MG/DL — HIGH (ref 70–99)
GLUCOSE SERPL-MCNC: 193 MG/DL — HIGH (ref 70–99)
HCG UR QL: NEGATIVE — SIGNIFICANT CHANGE UP
HCT VFR BLD CALC: 30.8 % — LOW (ref 34.5–45)
HCT VFR BLD CALC: 33.5 % — LOW (ref 34.5–45)
HGB BLD-MCNC: 10.6 G/DL — LOW (ref 11.5–15.5)
HGB BLD-MCNC: 9.9 G/DL — LOW (ref 11.5–15.5)
INR BLD: 1.35 RATIO — HIGH (ref 0.88–1.16)
INR BLD: 1.41 RATIO — HIGH (ref 0.88–1.16)
LYMPHOCYTES # BLD AUTO: 21.8 % — SIGNIFICANT CHANGE UP (ref 13–44)
LYMPHOCYTES # BLD AUTO: 5.42 K/UL — HIGH (ref 1–3.3)
MAGNESIUM SERPL-MCNC: 1.6 MG/DL — SIGNIFICANT CHANGE UP (ref 1.6–2.6)
MANUAL SMEAR VERIFICATION: SIGNIFICANT CHANGE UP
MCHC RBC-ENTMCNC: 26.8 PG — LOW (ref 27–34)
MCHC RBC-ENTMCNC: 26.9 PG — LOW (ref 27–34)
MCHC RBC-ENTMCNC: 31.6 GM/DL — LOW (ref 32–36)
MCHC RBC-ENTMCNC: 32.1 GM/DL — SIGNIFICANT CHANGE UP (ref 32–36)
MCV RBC AUTO: 83.7 FL — SIGNIFICANT CHANGE UP (ref 80–100)
MCV RBC AUTO: 84.6 FL — SIGNIFICANT CHANGE UP (ref 80–100)
MICROCYTES BLD QL: SLIGHT — SIGNIFICANT CHANGE UP
MONOCYTES # BLD AUTO: 1.09 K/UL — HIGH (ref 0–0.9)
MONOCYTES NFR BLD AUTO: 4.4 % — SIGNIFICANT CHANGE UP (ref 2–14)
NEUTROPHILS # BLD AUTO: 17.49 K/UL — HIGH (ref 1.8–7.4)
NEUTROPHILS NFR BLD AUTO: 70.4 % — SIGNIFICANT CHANGE UP (ref 43–77)
NRBC # BLD: 0 /100 WBCS — SIGNIFICANT CHANGE UP (ref 0–0)
PHOSPHATE SERPL-MCNC: 3.8 MG/DL — SIGNIFICANT CHANGE UP (ref 2.5–4.5)
PLAT MORPH BLD: NORMAL — SIGNIFICANT CHANGE UP
PLATELET # BLD AUTO: 222 K/UL — SIGNIFICANT CHANGE UP (ref 150–400)
PLATELET # BLD AUTO: 245 K/UL — SIGNIFICANT CHANGE UP (ref 150–400)
POTASSIUM SERPL-MCNC: 3.5 MMOL/L — SIGNIFICANT CHANGE UP (ref 3.5–5.3)
POTASSIUM SERPL-MCNC: 4.2 MMOL/L — SIGNIFICANT CHANGE UP (ref 3.5–5.3)
POTASSIUM SERPL-SCNC: 3.5 MMOL/L — SIGNIFICANT CHANGE UP (ref 3.5–5.3)
POTASSIUM SERPL-SCNC: 4.2 MMOL/L — SIGNIFICANT CHANGE UP (ref 3.5–5.3)
PROT SERPL-MCNC: 6.3 G/DL — SIGNIFICANT CHANGE UP (ref 6–8.3)
PROT SERPL-MCNC: 6.8 G/DL — SIGNIFICANT CHANGE UP (ref 6–8.3)
PROTHROM AB SERPL-ACNC: 15.7 SEC — HIGH (ref 10.5–13.4)
PROTHROM AB SERPL-ACNC: 16.3 SEC — HIGH (ref 10.5–13.4)
RBC # BLD: 3.68 M/UL — LOW (ref 3.8–5.2)
RBC # BLD: 3.96 M/UL — SIGNIFICANT CHANGE UP (ref 3.8–5.2)
RBC # FLD: 12.8 % — SIGNIFICANT CHANGE UP (ref 10.3–14.5)
RBC # FLD: 13 % — SIGNIFICANT CHANGE UP (ref 10.3–14.5)
RBC BLD AUTO: ABNORMAL
SODIUM SERPL-SCNC: 138 MMOL/L — SIGNIFICANT CHANGE UP (ref 135–145)
SODIUM SERPL-SCNC: 139 MMOL/L — SIGNIFICANT CHANGE UP (ref 135–145)
TROPONIN T, HIGH SENSITIVITY RESULT: 14 NG/L — SIGNIFICANT CHANGE UP (ref 0–51)
VARIANT LYMPHS # BLD: 1.7 % — SIGNIFICANT CHANGE UP (ref 0–6)
WBC # BLD: 10.16 K/UL — SIGNIFICANT CHANGE UP (ref 3.8–10.5)
WBC # BLD: 24.85 K/UL — HIGH (ref 3.8–10.5)
WBC # FLD AUTO: 10.16 K/UL — SIGNIFICANT CHANGE UP (ref 3.8–10.5)
WBC # FLD AUTO: 24.85 K/UL — HIGH (ref 3.8–10.5)

## 2023-03-28 PROCEDURE — 99291 CRITICAL CARE FIRST HOUR: CPT

## 2023-03-28 PROCEDURE — 93010 ELECTROCARDIOGRAM REPORT: CPT

## 2023-03-28 PROCEDURE — 99233 SBSQ HOSP IP/OBS HIGH 50: CPT

## 2023-03-28 PROCEDURE — S2900 ROBOTIC SURGICAL SYSTEM: CPT | Mod: NC

## 2023-03-28 PROCEDURE — 71045 X-RAY EXAM CHEST 1 VIEW: CPT | Mod: 26

## 2023-03-28 PROCEDURE — 33533 CABG ARTERIAL SINGLE: CPT

## 2023-03-28 DEVICE — CATH CVC 2 LUMEM  8FR X 11CM: Type: IMPLANTABLE DEVICE | Status: FUNCTIONAL

## 2023-03-28 DEVICE — LIGATING CLIPS WECK HORIZON MEDIUM (BLUE) 24: Type: IMPLANTABLE DEVICE | Status: FUNCTIONAL

## 2023-03-28 DEVICE — KIT A-LINE 1LUM 20G X 12CM SAFE KIT: Type: IMPLANTABLE DEVICE | Status: FUNCTIONAL

## 2023-03-28 DEVICE — LIGATING CLIPS WECK HORIZON SMALL-WIDE (RED) 24: Type: IMPLANTABLE DEVICE | Status: FUNCTIONAL

## 2023-03-28 DEVICE — SHUNT CORONARY MAQUET AXIUS 1.75MM: Type: IMPLANTABLE DEVICE | Status: FUNCTIONAL

## 2023-03-28 DEVICE — CHEST DRAIN THORACIC PVC 28FR RIGHT ANGLE: Type: IMPLANTABLE DEVICE | Status: FUNCTIONAL

## 2023-03-28 RX ORDER — CHLORHEXIDINE GLUCONATE 213 G/1000ML
1 SOLUTION TOPICAL DAILY
Refills: 0 | Status: DISCONTINUED | OUTPATIENT
Start: 2023-03-28 | End: 2023-04-02

## 2023-03-28 RX ORDER — ONDANSETRON 8 MG/1
4 TABLET, FILM COATED ORAL EVERY 6 HOURS
Refills: 0 | Status: DISCONTINUED | OUTPATIENT
Start: 2023-03-28 | End: 2023-03-30

## 2023-03-28 RX ORDER — CHLORHEXIDINE GLUCONATE 213 G/1000ML
5 SOLUTION TOPICAL
Refills: 0 | Status: DISCONTINUED | OUTPATIENT
Start: 2023-03-28 | End: 2023-03-28

## 2023-03-28 RX ORDER — AMIODARONE HYDROCHLORIDE 400 MG/1
400 TABLET ORAL
Refills: 0 | Status: COMPLETED | OUTPATIENT
Start: 2023-03-28 | End: 2023-03-31

## 2023-03-28 RX ORDER — NALOXONE HYDROCHLORIDE 4 MG/.1ML
0.1 SPRAY NASAL
Refills: 0 | Status: DISCONTINUED | OUTPATIENT
Start: 2023-03-28 | End: 2023-03-30

## 2023-03-28 RX ORDER — HYDROMORPHONE HYDROCHLORIDE 2 MG/ML
30 INJECTION INTRAMUSCULAR; INTRAVENOUS; SUBCUTANEOUS
Refills: 0 | Status: DISCONTINUED | OUTPATIENT
Start: 2023-03-28 | End: 2023-03-29

## 2023-03-28 RX ORDER — INSULIN HUMAN 100 [IU]/ML
3 INJECTION, SOLUTION SUBCUTANEOUS
Qty: 100 | Refills: 0 | Status: DISCONTINUED | OUTPATIENT
Start: 2023-03-28 | End: 2023-03-29

## 2023-03-28 RX ORDER — DEXTROSE 50 % IN WATER 50 %
25 SYRINGE (ML) INTRAVENOUS
Refills: 0 | Status: DISCONTINUED | OUTPATIENT
Start: 2023-03-28 | End: 2023-03-28

## 2023-03-28 RX ORDER — OXYCODONE HYDROCHLORIDE 5 MG/1
5 TABLET ORAL EVERY 4 HOURS
Refills: 0 | Status: DISCONTINUED | OUTPATIENT
Start: 2023-03-28 | End: 2023-04-02

## 2023-03-28 RX ORDER — POTASSIUM CHLORIDE 20 MEQ
10 PACKET (EA) ORAL
Refills: 0 | Status: DISCONTINUED | OUTPATIENT
Start: 2023-03-28 | End: 2023-03-28

## 2023-03-28 RX ORDER — ALBUMIN HUMAN 25 %
250 VIAL (ML) INTRAVENOUS ONCE
Refills: 0 | Status: COMPLETED | OUTPATIENT
Start: 2023-03-28 | End: 2023-03-28

## 2023-03-28 RX ORDER — POLYETHYLENE GLYCOL 3350 17 G/17G
17 POWDER, FOR SOLUTION ORAL DAILY
Refills: 0 | Status: DISCONTINUED | OUTPATIENT
Start: 2023-03-29 | End: 2023-04-02

## 2023-03-28 RX ORDER — ACETAMINOPHEN 500 MG
650 TABLET ORAL EVERY 6 HOURS
Refills: 0 | Status: COMPLETED | OUTPATIENT
Start: 2023-03-28 | End: 2023-03-31

## 2023-03-28 RX ORDER — ONDANSETRON 8 MG/1
4 TABLET, FILM COATED ORAL ONCE
Refills: 0 | Status: COMPLETED | OUTPATIENT
Start: 2023-03-28 | End: 2023-03-28

## 2023-03-28 RX ORDER — HYDROMORPHONE HYDROCHLORIDE 2 MG/ML
0.5 INJECTION INTRAMUSCULAR; INTRAVENOUS; SUBCUTANEOUS EVERY 6 HOURS
Refills: 0 | Status: DISCONTINUED | OUTPATIENT
Start: 2023-03-28 | End: 2023-03-30

## 2023-03-28 RX ORDER — ASPIRIN/CALCIUM CARB/MAGNESIUM 324 MG
81 TABLET ORAL DAILY
Refills: 0 | Status: DISCONTINUED | OUTPATIENT
Start: 2023-03-28 | End: 2023-04-02

## 2023-03-28 RX ORDER — SENNA PLUS 8.6 MG/1
2 TABLET ORAL AT BEDTIME
Refills: 0 | Status: DISCONTINUED | OUTPATIENT
Start: 2023-03-29 | End: 2023-04-02

## 2023-03-28 RX ORDER — PANTOPRAZOLE SODIUM 20 MG/1
40 TABLET, DELAYED RELEASE ORAL DAILY
Refills: 0 | Status: DISCONTINUED | OUTPATIENT
Start: 2023-03-28 | End: 2023-03-29

## 2023-03-28 RX ORDER — NOREPINEPHRINE BITARTRATE/D5W 8 MG/250ML
0.04 PLASTIC BAG, INJECTION (ML) INTRAVENOUS
Qty: 8 | Refills: 0 | Status: DISCONTINUED | OUTPATIENT
Start: 2023-03-28 | End: 2023-03-29

## 2023-03-28 RX ORDER — MEPERIDINE HYDROCHLORIDE 50 MG/ML
25 INJECTION INTRAMUSCULAR; INTRAVENOUS; SUBCUTANEOUS ONCE
Refills: 0 | Status: DISCONTINUED | OUTPATIENT
Start: 2023-03-28 | End: 2023-03-28

## 2023-03-28 RX ORDER — POTASSIUM CHLORIDE 20 MEQ
10 PACKET (EA) ORAL
Refills: 0 | Status: COMPLETED | OUTPATIENT
Start: 2023-03-28 | End: 2023-03-28

## 2023-03-28 RX ORDER — SODIUM CHLORIDE 9 MG/ML
1000 INJECTION INTRAMUSCULAR; INTRAVENOUS; SUBCUTANEOUS
Refills: 0 | Status: DISCONTINUED | OUTPATIENT
Start: 2023-03-28 | End: 2023-03-31

## 2023-03-28 RX ORDER — SODIUM CHLORIDE 9 MG/ML
500 INJECTION, SOLUTION INTRAVENOUS ONCE
Refills: 0 | Status: DISCONTINUED | OUTPATIENT
Start: 2023-03-28 | End: 2023-03-29

## 2023-03-28 RX ORDER — ASPIRIN/CALCIUM CARB/MAGNESIUM 324 MG
300 TABLET ORAL ONCE
Refills: 0 | Status: DISCONTINUED | OUTPATIENT
Start: 2023-03-28 | End: 2023-03-28

## 2023-03-28 RX ORDER — CHLORHEXIDINE GLUCONATE 213 G/1000ML
15 SOLUTION TOPICAL EVERY 12 HOURS
Refills: 0 | Status: DISCONTINUED | OUTPATIENT
Start: 2023-03-28 | End: 2023-03-28

## 2023-03-28 RX ORDER — GABAPENTIN 400 MG/1
100 CAPSULE ORAL EVERY 8 HOURS
Refills: 0 | Status: COMPLETED | OUTPATIENT
Start: 2023-03-28 | End: 2023-04-02

## 2023-03-28 RX ORDER — DEXTROSE 50 % IN WATER 50 %
50 SYRINGE (ML) INTRAVENOUS
Refills: 0 | Status: DISCONTINUED | OUTPATIENT
Start: 2023-03-28 | End: 2023-03-28

## 2023-03-28 RX ORDER — MAGNESIUM SULFATE 500 MG/ML
2 VIAL (ML) INJECTION ONCE
Refills: 0 | Status: COMPLETED | OUTPATIENT
Start: 2023-03-28 | End: 2023-03-28

## 2023-03-28 RX ORDER — ASCORBIC ACID 60 MG
500 TABLET,CHEWABLE ORAL
Refills: 0 | Status: COMPLETED | OUTPATIENT
Start: 2023-03-28 | End: 2023-04-02

## 2023-03-28 RX ORDER — CEFUROXIME AXETIL 250 MG
1500 TABLET ORAL EVERY 8 HOURS
Refills: 0 | Status: COMPLETED | OUTPATIENT
Start: 2023-03-28 | End: 2023-03-29

## 2023-03-28 RX ORDER — ACETAMINOPHEN 500 MG
650 TABLET ORAL EVERY 6 HOURS
Refills: 0 | Status: COMPLETED | OUTPATIENT
Start: 2023-03-31 | End: 2024-02-27

## 2023-03-28 RX ORDER — HYDROMORPHONE HYDROCHLORIDE 2 MG/ML
0.5 INJECTION INTRAMUSCULAR; INTRAVENOUS; SUBCUTANEOUS ONCE
Refills: 0 | Status: DISCONTINUED | OUTPATIENT
Start: 2023-03-28 | End: 2023-03-28

## 2023-03-28 RX ORDER — OXYCODONE HYDROCHLORIDE 5 MG/1
10 TABLET ORAL EVERY 4 HOURS
Refills: 0 | Status: DISCONTINUED | OUTPATIENT
Start: 2023-03-28 | End: 2023-04-02

## 2023-03-28 RX ADMIN — Medication 1000 MILLIGRAM(S): at 07:06

## 2023-03-28 RX ADMIN — Medication 100 MILLIEQUIVALENT(S): at 14:15

## 2023-03-28 RX ADMIN — HYDROMORPHONE HYDROCHLORIDE 0.5 MILLIGRAM(S): 2 INJECTION INTRAMUSCULAR; INTRAVENOUS; SUBCUTANEOUS at 14:00

## 2023-03-28 RX ADMIN — CHLORHEXIDINE GLUCONATE 30 MILLILITER(S): 213 SOLUTION TOPICAL at 07:05

## 2023-03-28 RX ADMIN — GABAPENTIN 100 MILLIGRAM(S): 400 CAPSULE ORAL at 18:24

## 2023-03-28 RX ADMIN — HYDROMORPHONE HYDROCHLORIDE 30 MILLILITER(S): 2 INJECTION INTRAMUSCULAR; INTRAVENOUS; SUBCUTANEOUS at 16:00

## 2023-03-28 RX ADMIN — Medication 100 MILLIEQUIVALENT(S): at 16:00

## 2023-03-28 RX ADMIN — INSULIN HUMAN 3 UNIT(S)/HR: 100 INJECTION, SOLUTION SUBCUTANEOUS at 19:38

## 2023-03-28 RX ADMIN — Medication 81 MILLIGRAM(S): at 20:20

## 2023-03-28 RX ADMIN — Medication 650 MILLIGRAM(S): at 18:55

## 2023-03-28 RX ADMIN — Medication 2000 MILLIGRAM(S): at 05:14

## 2023-03-28 RX ADMIN — Medication 5.85 MICROGRAM(S)/KG/MIN: at 19:38

## 2023-03-28 RX ADMIN — HYDROMORPHONE HYDROCHLORIDE 0.5 MILLIGRAM(S): 2 INJECTION INTRAMUSCULAR; INTRAVENOUS; SUBCUTANEOUS at 14:15

## 2023-03-28 RX ADMIN — CHLORHEXIDINE GLUCONATE 1 APPLICATION(S): 213 SOLUTION TOPICAL at 21:10

## 2023-03-28 RX ADMIN — Medication 650 MILLIGRAM(S): at 18:25

## 2023-03-28 RX ADMIN — Medication 50 MILLIGRAM(S): at 05:14

## 2023-03-28 RX ADMIN — GABAPENTIN 100 MILLIGRAM(S): 400 CAPSULE ORAL at 23:14

## 2023-03-28 RX ADMIN — Medication 125 MILLILITER(S): at 20:27

## 2023-03-28 RX ADMIN — Medication 500 MILLIGRAM(S): at 18:24

## 2023-03-28 RX ADMIN — AMIODARONE HYDROCHLORIDE 400 MILLIGRAM(S): 400 TABLET ORAL at 18:24

## 2023-03-28 RX ADMIN — Medication 100 MILLIGRAM(S): at 21:06

## 2023-03-28 RX ADMIN — PANTOPRAZOLE SODIUM 40 MILLIGRAM(S): 20 TABLET, DELAYED RELEASE ORAL at 20:20

## 2023-03-28 RX ADMIN — Medication 1000 MILLIGRAM(S): at 07:36

## 2023-03-28 RX ADMIN — GABAPENTIN 200 MILLIGRAM(S): 400 CAPSULE ORAL at 07:06

## 2023-03-28 RX ADMIN — ONDANSETRON 4 MILLIGRAM(S): 8 TABLET, FILM COATED ORAL at 22:45

## 2023-03-28 RX ADMIN — Medication 125 MILLILITER(S): at 17:00

## 2023-03-28 RX ADMIN — HYDROMORPHONE HYDROCHLORIDE 30 MILLILITER(S): 2 INJECTION INTRAMUSCULAR; INTRAVENOUS; SUBCUTANEOUS at 19:00

## 2023-03-28 RX ADMIN — Medication 25 GRAM(S): at 21:31

## 2023-03-28 NOTE — PROGRESS NOTE ADULT - SUBJECTIVE AND OBJECTIVE BOX
SUBJ: intermittent CP,   for OR today     MEDICATIONS  (STANDING):  acetaminophen     Tablet .. 1000 milliGRAM(s) Oral once  aspirin enteric coated 81 milliGRAM(s) Oral daily  atorvastatin 80 milliGRAM(s) Oral at bedtime  cefuroxime  IVPB 1500 milliGRAM(s) IV Intermittent once  chlorhexidine 0.12% Liquid 30 milliLiter(s) Swish and Spit once  coronavirus bivalent (EUA) Booster Vaccine (PFIZER) 0.3 milliLiter(s) IntraMuscular once  dextrose 5%. 1000 milliLiter(s) (100 mL/Hr) IV Continuous <Continuous>  dextrose 5%. 1000 milliLiter(s) (50 mL/Hr) IV Continuous <Continuous>  dextrose 50% Injectable 25 Gram(s) IV Push once  dextrose 50% Injectable 25 Gram(s) IV Push once  dextrose 50% Injectable 12.5 Gram(s) IV Push once  gabapentin 200 milliGRAM(s) Oral once  glucagon  Injectable 1 milliGRAM(s) IntraMuscular once  influenza   Vaccine 0.5 milliLiter(s) IntraMuscular once  insulin lispro (ADMELOG) corrective regimen sliding scale   SubCutaneous at bedtime  insulin lispro (ADMELOG) corrective regimen sliding scale   SubCutaneous three times a day before meals  metoprolol succinate ER 50 milliGRAM(s) Oral two times a day  sodium chloride 0.9%. 1000 milliLiter(s) (75 mL/Hr) IV Continuous <Continuous>    MEDICATIONS  (PRN):  acetaminophen     Tablet .. 650 milliGRAM(s) Oral every 6 hours PRN Temp greater or equal to 38C (100.4F), Mild Pain (1 - 3)  dextrose Oral Gel 15 Gram(s) Oral once PRN Blood Glucose LESS THAN 70 milliGRAM(s)/deciliter  melatonin 5 milliGRAM(s) Oral at bedtime PRN Insomnia    Vital Signs Last 24 Hrs  T(C): 36.6 (28 Mar 2023 05:36), Max: 36.8 (27 Mar 2023 11:21)  T(F): 97.8 (28 Mar 2023 05:36), Max: 98.2 (27 Mar 2023 11:21)  HR: 75 (28 Mar 2023 05:36) (71 - 75)  BP: 110/71 (28 Mar 2023 05:36) (106/70 - 124/76)  BP(mean): --  RR: 12 (28 Mar 2023 05:36) (12 - 18)  SpO2: 99% (28 Mar 2023 05:36) (99% - 100%)    Parameters below as of 28 Mar 2023 05:09  Patient On (Oxygen Delivery Method): room air        REVIEW OF SYSTEMS:  CONSTITUTIONAL: No fever, weight loss, or fatigue  EYES: No eye pain, visual disturbances, or discharge  ENMT:  No difficulty hearing, tinnitus, vertigo; No sinus or throat pain  NECK: No pain or stiffness  RESPIRATORY: No cough, wheezing, chills or hemoptysis; No shortness of breath  CARDIOVASCULAR: +chest pain, no palpitations, dizziness, or leg swelling  GASTROINTESTINAL: No abdominal or epigastric pain. No nausea, vomiting, or hematemesis; No diarrhea or constipation. No melena or hematochezia.  GENITOURINARY: No dysuria, frequency, hematuria, or incontinence  NEUROLOGICAL: No headaches, memory loss, loss of strength, numbness, or tremors  SKIN: No itching, burning, rashes, or lesions   LYMPH NODES: No enlarged glands  ENDOCRINE: No heat or cold intolerance; No hair loss  MUSCULOSKELETAL: No joint pain or swelling; No muscle, back, or extremity pain  PSYCHIATRIC: No depression, anxiety, mood swings, or difficulty sleeping  HEME/LYMPH: No easy bruising, or bleeding gums  ALLERY AND IMMUNOLOGIC: No hives or eczema          PHYSICAL EXAM:  · CONSTITUTIONAL: Well-developed, well nourished      · EYES: EOMI; PERRL; no drainage or redness  · NECK: No bruits; no thyromegaly or nodules  ·RESPIRATORY:   airway patent; breath sounds equal; good air movement; respirations non-labored; clear to auscultation bilaterally; no chest wall tenderness; no intercostal retractions; no rales,rhonchi or wheeze  · CARDIOVASCULAR: regular rate and rhythm  no rub  no murmur  normal PMI  . GASTROINTESTINAL:  no distention; no masses palpable; bowel sounds normal  · EXTREMITIES: No cyanosis, clubbing or edema  · VASCULAR:  Equal and normal pulses (radial, femoral)  ·NEUROLOGICAL:  Alert and oriented x 3; sensation intact; deep reflexes intact; cranial nerves intact; no spontaneous movement; superficial reflexes intact; normal strength  · SKIN: No lesions; no rash  . LYMPH NODES: No lymphadedenopathy  · MUSCULOSKELETAL:  No calf tenderness  no joint swelling	    TELEMETRY: no events    ECG:    TTE:    LABS:                     RADIOLOGY & ADDITIONAL STUDIES:    IMPRESSION AND PLAN:  RAGHAV LAINEZ is a 48y/o Female with PMHx of DM2 on metformin who presentd with CP found to have severe obstruction of the pLAD. Pt now s/p LHC demonstrating 100% stenosis in ostial LAD    for upcoming CABG OR 3/28    CAD  for CABG  early onset CAD  LDL not at level consistent with familial hyperlipidemia  check LPa  continue   aspirin enteric coated 81 milliGRAM(s) Oral daily  atorvastatin 80 milliGRAM(s) Oral at bedtime   metoprolol succinate ER 50 milliGRAM(s) Oral two times a day    She will need an outpt cardiologist in [place]    Gustavo Espitia MD, PhD  Cardiology Attending  [facility]/ [facility]    For day time coverage Mon-Fri see Non-Service Consult Attending on [email or web address], password: [email or web address]; daytime weekends covered by general cardiology consult service attending.)

## 2023-03-28 NOTE — PROGRESS NOTE ADULT - SUBJECTIVE AND OBJECTIVE BOX
Patient is a 47y old  Female who presents with a chief complaint of preop cabg (27 Mar 2023 11:57)      SUBJECTIVE / OVERNIGHT EVENTS: No new complaints.   Review of Systems  chest pain yes  palpitations no  sob no  nausea no  headache no    MEDICATIONS  (STANDING):  acetaminophen     Tablet .. 650 milliGRAM(s) Oral every 6 hours  aMIOdarone    Tablet 400 milliGRAM(s) Oral two times a day  ascorbic acid 500 milliGRAM(s) Oral two times a day  aspirin enteric coated 81 milliGRAM(s) Oral daily  aspirin Suppository 300 milliGRAM(s) Rectal once  cefuroxime  IVPB 1500 milliGRAM(s) IV Intermittent every 8 hours  chlorhexidine 0.12% Liquid 15 milliLiter(s) Oral Mucosa every 12 hours  chlorhexidine 0.12% Liquid 5 milliLiter(s) Oral Mucosa two times a day  chlorhexidine 2% Cloths 1 Application(s) Topical daily  dextrose 50% Injectable 50 milliLiter(s) IV Push every 15 minutes  dextrose 50% Injectable 25 milliLiter(s) IV Push every 15 minutes  gabapentin 100 milliGRAM(s) Oral every 8 hours  HYDROmorphone PCA (1 mG/mL) 30 milliLiter(s) PCA Continuous PCA Continuous  insulin regular Infusion 3 Unit(s)/Hr (3 mL/Hr) IV Continuous <Continuous>  lactated ringers Bolus 500 milliLiter(s) IV Bolus once  meperidine     Injectable 25 milliGRAM(s) IV Push once  norepinephrine Infusion 0.04 MICROgram(s)/kG/Min (5.85 mL/Hr) IV Continuous <Continuous>  pantoprazole  Injectable 40 milliGRAM(s) IV Push daily  potassium chloride  10 mEq/50 mL IVPB 10 milliEquivalent(s) IV Intermittent every 1 hour  potassium chloride  10 mEq/50 mL IVPB 10 milliEquivalent(s) IV Intermittent every 1 hour  potassium chloride  10 mEq/50 mL IVPB 10 milliEquivalent(s) IV Intermittent every 1 hour  sodium chloride 0.9%. 1000 milliLiter(s) (75 mL/Hr) IV Continuous <Continuous>  sodium chloride 0.9%. 1000 milliLiter(s) (10 mL/Hr) IV Continuous <Continuous>    MEDICATIONS  (PRN):  HYDROmorphone  Injectable 0.5 milliGRAM(s) IV Push every 6 hours PRN Breakthrough Pain  naloxone Injectable 0.1 milliGRAM(s) IV Push every 3 minutes PRN For ANY of the following changes in patient status:  A. RR LESS THAN 10 breaths per minute, B. Oxygen saturation LESS THAN 90%, C. Sedation score of 6  ondansetron Injectable 4 milliGRAM(s) IV Push every 6 hours PRN Nausea  oxyCODONE    IR 5 milliGRAM(s) Oral every 4 hours PRN Moderate Pain (4 - 6)  oxyCODONE    IR 10 milliGRAM(s) Oral every 4 hours PRN Severe Pain (7 - 10)      Vital Signs Last 24 Hrs  T(C): 36.7 (28 Mar 2023 16:00), Max: 36.7 (27 Mar 2023 20:02)  T(F): 98.1 (28 Mar 2023 16:00), Max: 98.1 (27 Mar 2023 20:02)  HR: 61 (28 Mar 2023 19:15) (55 - 75)  BP: 110/71 (28 Mar 2023 08:09) (110/71 - 124/76)  BP(mean): 96 (28 Mar 2023 08:09) (96 - 96)  RR: 15 (28 Mar 2023 19:15) (8 - 29)  SpO2: 100% (28 Mar 2023 19:15) (99% - 100%)    Parameters below as of 28 Mar 2023 20:00  Patient On (Oxygen Delivery Method): nasal cannula        PHYSICAL EXAM:  GENERAL: NAD, well-developed  HEAD:  Atraumatic, Normocephalic  EYES: EOMI, PERRLA, conjunctiva and sclera clear  NECK: Supple, No JVD  CHEST/LUNG: Clear to auscultation bilaterally; No wheeze  HEART: Regular rate and rhythm; No murmurs, rubs, or gallops  ABDOMEN: Soft, Nontender, Nondistended; Bowel sounds present  EXTREMITIES:  2+ Peripheral Pulses, No clubbing, cyanosis, or edema  PSYCH: AAOx3  NEUROLOGY: non-focal  SKIN: No rashes or lesions    LABS:                        9.9    24.85 )-----------( 245      ( 28 Mar 2023 14:13 )             30.8     03-28    138  |  102  |  11  ----------------------------<  147<H>  3.5   |  26  |  0.45<L>    Ca    8.9      28 Mar 2023 14:14  Phos  3.8     03-28  Mg     1.6     03-28    TPro  6.3  /  Alb  3.7  /  TBili  0.7  /  DBili  x   /  AST  15  /  ALT  18  /  AlkPhos  66  03-28    PT/INR - ( 28 Mar 2023 14:13 )   PT: 16.3 sec;   INR: 1.41 ratio         PTT - ( 28 Mar 2023 14:13 )  PTT:29.7 sec  CARDIAC MARKERS ( 28 Mar 2023 14:14 )  x     / x     / 192 U/L / x     / 2.2 ng/mL            RADIOLOGY & ADDITIONAL TESTS:    Imaging Personally Reviewed:    Consultant(s) Notes Reviewed:      Care Discussed with Consultants/Other Providers:

## 2023-03-28 NOTE — PROGRESS NOTE ADULT - SUBJECTIVE AND OBJECTIVE BOX
HPI:  47yoF presenting to the ED yesterday with a HA over last couple days.   During her evaluation she noted that she has had several episodes of intermittent left sided CP over the last couple days. States that it is exertional in nature and radiates to her left arm. States that she can walk about 1/2block before she starts to have symptoms. Prior to this she reports that she had a couple short lived [place] but her ECG's were normal so no further evaluation was done. She denies LE edema, orthopnea, PND.   Her ECG was NSR without ischemic findings and her Trop T was <6.   As a result she was ordered for a CTa coronaries which is concerning for a severe obstruction of the pLAD.  (18 Mar 2023 20:02)      Patient seen and examined at the bedside.    Remained critically ill on continuous ICU monitoring.    OBJECTIVE:  Vital Signs Last 24 Hrs  T(C): 36.7 (28 Mar 2023 16:00), Max: 36.7 (27 Mar 2023 20:02)  T(F): 98.1 (28 Mar 2023 16:00), Max: 98.1 (27 Mar 2023 20:02)  HR: 63 (28 Mar 2023 16:15) (55 - 75)  BP: 110/71 (28 Mar 2023 08:09) (110/71 - 124/76)  BP(mean): 96 (28 Mar 2023 08:09) (96 - 96)  RR: 16 (28 Mar 2023 16:15) (8 - 29)  SpO2: 100% (28 Mar 2023 16:15) (99% - 100%)    Parameters below as of 28 Mar 2023 16:00  Patient On (Oxygen Delivery Method): nasal cannula  O2 Flow (L/min): 6      Physical Exam:   General: NAD   Neurology: nonfocal   Eyes: bilateral pupils equal and reactive   ENT/Neck: Neck supple, trachea midline, No JVD   Respiratory: Clear bilaterally   CV: S1S2, no murmurs        [x] Sternal dressing, [x] L Pleural CT        [x] Sinus rhythm  Abdominal: Soft, NT, ND +BS  Extremities: 1-2+ pedal edema noted, + peripheral pulses   Skin: No Rashes, Hematoma, Ecchymosis                         Assessment:  46yo F presenting to the ED yesterday with a HA over last couple days. During her evaluation she noted that she has had several episodes of intermittent left sided CP over the last couple days. States that it is exertional in nature and radiates to her left arm. States that she can walk about 1/2block before she starts to have symptoms. Prior to this she reports that she had a couple short lived episodes of [place] in [place] country but her ECG's were normal so no further evaluation was done.     CAD s/p CABG 03/28/23   Hemodynamic instability   Hypovolemia   Stress hyperglycemia     Plan:   ***Neuro***  Addressed analgesic regimen to optimize function.    ***Cardiovascular***  Patient admitted with coronary artery disease s/p CABG on 3/28/23. IV Levophed infusion for __ shock. Continue with amiodarone for atrial fibrillation prophylaxis. Invasive hemodynamic monitoring with a PA catheter & an A-line were required for the following of serial CI's/MV02's and continuous central venous, pulmonary artery pressure and MAP/BP monitoring to ensure adequate cardiovascular support. ASA continued for graft occlusion-thromboembolism prophylaxis.     ***Pulmonary***  Respiratory status required supplemental oxygen, close monitoring of breathing pattern and respiratory rate & the following of continuous pulse oximetry for support & to prevent decompensation.      ***Heme***  Monitor hemoglobin and hematocrit levels.    ***GI***  NPO Diet. Protonix for stress ulcer prophylaxis.     ***Renal***  Optimize renal perfusion with adequate volume resuscitation and continued monitoring of urine output, fluid balance, electrolytes, and BUN/Creatinine.      ***ID***  Afebrile, white blood count within normal limits. Continue trending white blood count and monitoring fever curve. Perioperative coverage with Cefuroxime.    ***Endocrine***  Metabolic stability, stress hyperglycemia required an IV regular Insulin drip while following serial glucose levels to help achieve and maintain euglycemia.          Patient requires continuous monitoring with bedside rhythm monitoring, pulse oximetry monitoring, and continuous central venous and arterial pressure monitoring; and intermittent blood gas analysis. Care plan discussed with the ICU care team.   Patient remained critical, at risk for life threatening decompensation.    I have spent 30 minutes providing critical care management to this patient.    By signing my name below, I, Opal Kingston, attest that this documentation has been prepared under the direction and in the presence of Riana Kaur MD   Electronically signed: Ernesto Castellon, 03-28-23 @ 17:42    I, Riana Kaur, personally performed the services described in this documentation. all medical record entries made by the sonalibnicola were at my direction and in my presence. I have reviewed the chart and agree that the record reflects my personal performance and is accurate and complete  Electronically signed: Riana Kaur MD  HPI:  47yoF presenting to the ED yesterday with a HA over last couple days.   During her evaluation she noted that she has had several episodes of intermittent left sided CP over the last couple days. States that it is exertional in nature and radiates to her left arm. States that she can walk about 1/2block before she starts to have symptoms. Prior to this she reports that she had a couple short lived episodes of [place] but her ECG's were normal so no further evaluation was done. She denies LE edema, orthopnea, PND.   Her ECG was NSR without ischemic findings and her Trop T was <6.   As a result she was ordered for a CTa coronaries which is concerning for a severe obstruction of the pLAD.  (18 Mar 2023 20:02)      Patient seen and examined at the bedside.    Remained critically ill on continuous ICU monitoring.    OBJECTIVE:  Vital Signs Last 24 Hrs  T(C): 36.7 (28 Mar 2023 16:00), Max: 36.7 (27 Mar 2023 20:02)  T(F): 98.1 (28 Mar 2023 16:00), Max: 98.1 (27 Mar 2023 20:02)  HR: 63 (28 Mar 2023 16:15) (55 - 75)  BP: 110/71 (28 Mar 2023 08:09) (110/71 - 124/76)  BP(mean): 96 (28 Mar 2023 08:09) (96 - 96)  RR: 16 (28 Mar 2023 16:15) (8 - 29)  SpO2: 100% (28 Mar 2023 16:15) (99% - 100%)    Parameters below as of 28 Mar 2023 16:00  Patient On (Oxygen Delivery Method): nasal cannula  O2 Flow (L/min): 6      Physical Exam:   General: Mildly obese female, some splinting at rest  Neurology: without focal deficit  Eyes: bilateral pupils equal and reactive   ENT/Neck: Neck supple, trachea midline, No JVD   Respiratory: Clear bilaterally   CV: S1S2, no murmurs        [x] left thoracotomy dressing, [x] L Pleural CT        [x] Sinus rhythm  Abdominal: Soft, NT, ND +BS  Extremities: No pedal edema noted, + peripheral pulses   Skin: No Rashes, Hematoma, Ecchymosis                         Assessment:  46yo F presenting to the ED yesterday with a HA over last couple days. During her evaluation she noted that she has had several episodes of intermittent left sided CP over the last couple days. States that it is exertional in nature and radiates to her left arm. States that she can walk about 1/2block before she starts to have symptoms. Prior to this she reports that she had a couple short lived episodes of [place] but her ECG's were normal so no further evaluation was done.     CAD s/p CABG 03/28/23   Hemodynamic instability   Hypovolemia   Stress hyperglycemia     Plan:   ***Neuro***  Addressed analgesic regimen to optimize function. PCA ordered for management of pain and splinting.    ***Cardiovascular***  Patient admitted with coronary artery disease s/p CABG on 3/28/23. Patient has required volume resuscitation and a low dose IV Levophed infusion for vasodilatory shock. Continue with amiodarone for atrial fibrillation prophylaxis. Invasive hemodynamic monitoring with a central venous catheter & an A-line were required for continuous central venous and MAP/BP monitoring to ensure adequate cardiovascular support. ASA continued for graft occlusion-thromboembolism prophylaxis.     ***Pulmonary***  Respiratory status required supplemental oxygen, close monitoring of breathing pattern and respiratory rate & the following of continuous pulse oximetry for support & to prevent decompensation. Encourage incentive spirometry, patient has mild hypercarbia.     ***Heme***  Anemia due to acute blood loss. No plan for transfusion. Monitor hemoglobin and hematocrit levels.    ***GI***  NPO, advance to clears as tolerated. Protonix for stress ulcer prophylaxis.     ***Renal***  Optimize renal perfusion with adequate volume resuscitation and continued monitoring of urine output, fluid balance, electrolytes, and BUN/Creatinine.      ***ID***  Afebrile, white blood count within normal limits. Continue trending white blood count and monitoring fever curve. Perioperative coverage with Cefuroxime.    ***Endocrine***  Metabolic stability, type 2 diabetes with hyperglycemia required an IV regular Insulin drip while following serial glucose levels to help achieve and maintain euglycemia.          Patient requires continuous monitoring with bedside rhythm monitoring, pulse oximetry monitoring, and continuous central venous and arterial pressure monitoring; and intermittent blood gas analysis. Care plan discussed with the ICU care team.   Patient remained critical, at risk for life threatening decompensation.    I have spent 30 minutes providing critical care management to this patient.    By signing my name below, I, Opal Kingston, attest that this documentation has been prepared under the direction and in the presence of Riana Kaur MD   Electronically signed: Ernesto Castellon, 03-28-23 @ 17:42    I, Riana Kaur, personally performed the services described in this documentation. all medical record entries made by the scribe were at my direction and in my presence. I have reviewed the chart and agree that the record reflects my personal performance and is accurate and complete  Electronically signed: Riana Kaur MD

## 2023-03-28 NOTE — PROGRESS NOTE ADULT - ASSESSMENT
47 f with    CAD/ Chest pain/ Abnormal CT coronaries    - telemetry  - ASA  - BB  - Cardiology evaluation   - cath high risk as per cardiology  - CT Surgery follow For possible robotic CABG with dr Nix      Diabetes Mellitus  - BS control  - ADA diet     DVT prophylaxis  - low risk     Jan Mitchell MD phone [phone]

## 2023-03-28 NOTE — BRIEF OPERATIVE NOTE - NSICDXBRIEFPROCEDURE_GEN_ALL_CORE_FT
PROCEDURES:  Minimally invasive direct coronary artery bypass (MIDCAB) with transesophageal echocardiography (DAVINA) 28-Mar-2023 14:25:02  Isael Stein

## 2023-03-29 DIAGNOSIS — I10 ESSENTIAL (PRIMARY) HYPERTENSION: ICD-10-CM

## 2023-03-29 LAB
ALBUMIN SERPL ELPH-MCNC: 4 G/DL — SIGNIFICANT CHANGE UP (ref 3.3–5)
ALP SERPL-CCNC: 53 U/L — SIGNIFICANT CHANGE UP (ref 40–120)
ALT FLD-CCNC: 16 U/L — SIGNIFICANT CHANGE UP (ref 10–45)
ANION GAP SERPL CALC-SCNC: 10 MMOL/L — SIGNIFICANT CHANGE UP (ref 5–17)
APTT BLD: 28.6 SEC — SIGNIFICANT CHANGE UP (ref 27.5–35.5)
AST SERPL-CCNC: 18 U/L — SIGNIFICANT CHANGE UP (ref 10–40)
BASOPHILS # BLD AUTO: 0.03 K/UL — SIGNIFICANT CHANGE UP (ref 0–0.2)
BASOPHILS NFR BLD AUTO: 0.2 % — SIGNIFICANT CHANGE UP (ref 0–2)
BILIRUB SERPL-MCNC: 0.5 MG/DL — SIGNIFICANT CHANGE UP (ref 0.2–1.2)
BUN SERPL-MCNC: 9 MG/DL — SIGNIFICANT CHANGE UP (ref 7–23)
CALCIUM SERPL-MCNC: 8.8 MG/DL — SIGNIFICANT CHANGE UP (ref 8.4–10.5)
CHLORIDE SERPL-SCNC: 102 MMOL/L — SIGNIFICANT CHANGE UP (ref 96–108)
CO2 SERPL-SCNC: 24 MMOL/L — SIGNIFICANT CHANGE UP (ref 22–31)
CREAT SERPL-MCNC: 0.41 MG/DL — LOW (ref 0.5–1.3)
EGFR: 124 ML/MIN/1.73M2 — SIGNIFICANT CHANGE UP
EOSINOPHIL # BLD AUTO: 0.01 K/UL — SIGNIFICANT CHANGE UP (ref 0–0.5)
EOSINOPHIL NFR BLD AUTO: 0.1 % — SIGNIFICANT CHANGE UP (ref 0–6)
GAS PNL BLDA: SIGNIFICANT CHANGE UP
GLUCOSE BLDC GLUCOMTR-MCNC: 102 MG/DL — HIGH (ref 70–99)
GLUCOSE BLDC GLUCOMTR-MCNC: 106 MG/DL — HIGH (ref 70–99)
GLUCOSE BLDC GLUCOMTR-MCNC: 116 MG/DL — HIGH (ref 70–99)
GLUCOSE BLDC GLUCOMTR-MCNC: 119 MG/DL — HIGH (ref 70–99)
GLUCOSE BLDC GLUCOMTR-MCNC: 120 MG/DL — HIGH (ref 70–99)
GLUCOSE BLDC GLUCOMTR-MCNC: 126 MG/DL — HIGH (ref 70–99)
GLUCOSE BLDC GLUCOMTR-MCNC: 132 MG/DL — HIGH (ref 70–99)
GLUCOSE BLDC GLUCOMTR-MCNC: 134 MG/DL — HIGH (ref 70–99)
GLUCOSE BLDC GLUCOMTR-MCNC: 135 MG/DL — HIGH (ref 70–99)
GLUCOSE BLDC GLUCOMTR-MCNC: 139 MG/DL — HIGH (ref 70–99)
GLUCOSE BLDC GLUCOMTR-MCNC: 145 MG/DL — HIGH (ref 70–99)
GLUCOSE BLDC GLUCOMTR-MCNC: 151 MG/DL — HIGH (ref 70–99)
GLUCOSE BLDC GLUCOMTR-MCNC: 153 MG/DL — HIGH (ref 70–99)
GLUCOSE BLDC GLUCOMTR-MCNC: 155 MG/DL — HIGH (ref 70–99)
GLUCOSE BLDC GLUCOMTR-MCNC: 173 MG/DL — HIGH (ref 70–99)
GLUCOSE BLDC GLUCOMTR-MCNC: 173 MG/DL — HIGH (ref 70–99)
GLUCOSE BLDC GLUCOMTR-MCNC: 176 MG/DL — HIGH (ref 70–99)
GLUCOSE BLDC GLUCOMTR-MCNC: 176 MG/DL — HIGH (ref 70–99)
GLUCOSE BLDC GLUCOMTR-MCNC: 182 MG/DL — HIGH (ref 70–99)
GLUCOSE BLDC GLUCOMTR-MCNC: 213 MG/DL — HIGH (ref 70–99)
GLUCOSE BLDC GLUCOMTR-MCNC: 239 MG/DL — HIGH (ref 70–99)
GLUCOSE BLDC GLUCOMTR-MCNC: 99 MG/DL — SIGNIFICANT CHANGE UP (ref 70–99)
GLUCOSE SERPL-MCNC: 103 MG/DL — HIGH (ref 70–99)
HCT VFR BLD CALC: 28.3 % — LOW (ref 34.5–45)
HGB BLD-MCNC: 8.9 G/DL — LOW (ref 11.5–15.5)
IMM GRANULOCYTES NFR BLD AUTO: 0.4 % — SIGNIFICANT CHANGE UP (ref 0–0.9)
INR BLD: 1.34 RATIO — HIGH (ref 0.88–1.16)
LYMPHOCYTES # BLD AUTO: 16 % — SIGNIFICANT CHANGE UP (ref 13–44)
LYMPHOCYTES # BLD AUTO: 2.19 K/UL — SIGNIFICANT CHANGE UP (ref 1–3.3)
MAGNESIUM SERPL-MCNC: 2.3 MG/DL — SIGNIFICANT CHANGE UP (ref 1.6–2.6)
MCHC RBC-ENTMCNC: 26.7 PG — LOW (ref 27–34)
MCHC RBC-ENTMCNC: 31.4 GM/DL — LOW (ref 32–36)
MCV RBC AUTO: 85 FL — SIGNIFICANT CHANGE UP (ref 80–100)
MONOCYTES # BLD AUTO: 0.89 K/UL — SIGNIFICANT CHANGE UP (ref 0–0.9)
MONOCYTES NFR BLD AUTO: 6.5 % — SIGNIFICANT CHANGE UP (ref 2–14)
NEUTROPHILS # BLD AUTO: 10.49 K/UL — HIGH (ref 1.8–7.4)
NEUTROPHILS NFR BLD AUTO: 76.8 % — SIGNIFICANT CHANGE UP (ref 43–77)
NRBC # BLD: 0 /100 WBCS — SIGNIFICANT CHANGE UP (ref 0–0)
PHOSPHATE SERPL-MCNC: 4.1 MG/DL — SIGNIFICANT CHANGE UP (ref 2.5–4.5)
PLATELET # BLD AUTO: 220 K/UL — SIGNIFICANT CHANGE UP (ref 150–400)
POTASSIUM SERPL-MCNC: 4.3 MMOL/L — SIGNIFICANT CHANGE UP (ref 3.5–5.3)
POTASSIUM SERPL-SCNC: 4.3 MMOL/L — SIGNIFICANT CHANGE UP (ref 3.5–5.3)
PROT SERPL-MCNC: 6.3 G/DL — SIGNIFICANT CHANGE UP (ref 6–8.3)
PROTHROM AB SERPL-ACNC: 15.5 SEC — HIGH (ref 10.5–13.4)
RBC # BLD: 3.33 M/UL — LOW (ref 3.8–5.2)
RBC # FLD: 13 % — SIGNIFICANT CHANGE UP (ref 10.3–14.5)
SODIUM SERPL-SCNC: 136 MMOL/L — SIGNIFICANT CHANGE UP (ref 135–145)
WBC # BLD: 13.66 K/UL — HIGH (ref 3.8–10.5)
WBC # FLD AUTO: 13.66 K/UL — HIGH (ref 3.8–10.5)

## 2023-03-29 PROCEDURE — 99233 SBSQ HOSP IP/OBS HIGH 50: CPT

## 2023-03-29 PROCEDURE — 93010 ELECTROCARDIOGRAM REPORT: CPT

## 2023-03-29 PROCEDURE — 99291 CRITICAL CARE FIRST HOUR: CPT

## 2023-03-29 PROCEDURE — 71045 X-RAY EXAM CHEST 1 VIEW: CPT | Mod: 26

## 2023-03-29 RX ORDER — ALBUMIN HUMAN 25 %
250 VIAL (ML) INTRAVENOUS ONCE
Refills: 0 | Status: COMPLETED | OUTPATIENT
Start: 2023-03-29 | End: 2023-03-29

## 2023-03-29 RX ORDER — INSULIN LISPRO 100/ML
8 VIAL (ML) SUBCUTANEOUS
Refills: 0 | Status: DISCONTINUED | OUTPATIENT
Start: 2023-03-29 | End: 2023-03-30

## 2023-03-29 RX ORDER — ATORVASTATIN CALCIUM 80 MG/1
80 TABLET, FILM COATED ORAL AT BEDTIME
Refills: 0 | Status: DISCONTINUED | OUTPATIENT
Start: 2023-03-29 | End: 2023-04-02

## 2023-03-29 RX ORDER — METOPROLOL TARTRATE 50 MG
12.5 TABLET ORAL EVERY 12 HOURS
Refills: 0 | Status: DISCONTINUED | OUTPATIENT
Start: 2023-03-29 | End: 2023-03-30

## 2023-03-29 RX ORDER — ONDANSETRON 8 MG/1
4 TABLET, FILM COATED ORAL ONCE
Refills: 0 | Status: DISCONTINUED | OUTPATIENT
Start: 2023-03-29 | End: 2023-04-02

## 2023-03-29 RX ORDER — INSULIN LISPRO 100/ML
VIAL (ML) SUBCUTANEOUS
Refills: 0 | Status: DISCONTINUED | OUTPATIENT
Start: 2023-03-29 | End: 2023-04-02

## 2023-03-29 RX ORDER — PANTOPRAZOLE SODIUM 20 MG/1
40 TABLET, DELAYED RELEASE ORAL
Refills: 0 | Status: DISCONTINUED | OUTPATIENT
Start: 2023-03-29 | End: 2023-04-02

## 2023-03-29 RX ORDER — ACETAMINOPHEN 500 MG
1000 TABLET ORAL ONCE
Refills: 0 | Status: COMPLETED | OUTPATIENT
Start: 2023-03-29 | End: 2023-03-29

## 2023-03-29 RX ORDER — DEXTROSE 50 % IN WATER 50 %
50 SYRINGE (ML) INTRAVENOUS
Refills: 0 | Status: DISCONTINUED | OUTPATIENT
Start: 2023-03-29 | End: 2023-03-29

## 2023-03-29 RX ORDER — INSULIN GLARGINE 100 [IU]/ML
38 INJECTION, SOLUTION SUBCUTANEOUS AT BEDTIME
Refills: 0 | Status: DISCONTINUED | OUTPATIENT
Start: 2023-03-29 | End: 2023-03-30

## 2023-03-29 RX ORDER — HYDROMORPHONE HYDROCHLORIDE 2 MG/ML
30 INJECTION INTRAMUSCULAR; INTRAVENOUS; SUBCUTANEOUS
Refills: 0 | Status: DISCONTINUED | OUTPATIENT
Start: 2023-03-29 | End: 2023-03-30

## 2023-03-29 RX ORDER — DEXTROSE 50 % IN WATER 50 %
25 SYRINGE (ML) INTRAVENOUS
Refills: 0 | Status: DISCONTINUED | OUTPATIENT
Start: 2023-03-29 | End: 2023-03-29

## 2023-03-29 RX ORDER — INSULIN LISPRO 100/ML
VIAL (ML) SUBCUTANEOUS AT BEDTIME
Refills: 0 | Status: DISCONTINUED | OUTPATIENT
Start: 2023-03-29 | End: 2023-04-02

## 2023-03-29 RX ORDER — CLOPIDOGREL BISULFATE 75 MG/1
75 TABLET, FILM COATED ORAL DAILY
Refills: 0 | Status: DISCONTINUED | OUTPATIENT
Start: 2023-03-29 | End: 2023-04-02

## 2023-03-29 RX ORDER — INSULIN HUMAN 100 [IU]/ML
3 INJECTION, SOLUTION SUBCUTANEOUS
Qty: 100 | Refills: 0 | Status: DISCONTINUED | OUTPATIENT
Start: 2023-03-29 | End: 2023-03-30

## 2023-03-29 RX ORDER — ENOXAPARIN SODIUM 100 MG/ML
40 INJECTION SUBCUTANEOUS EVERY 24 HOURS
Refills: 0 | Status: DISCONTINUED | OUTPATIENT
Start: 2023-03-29 | End: 2023-04-02

## 2023-03-29 RX ADMIN — HYDROMORPHONE HYDROCHLORIDE 30 MILLILITER(S): 2 INJECTION INTRAMUSCULAR; INTRAVENOUS; SUBCUTANEOUS at 19:06

## 2023-03-29 RX ADMIN — GABAPENTIN 100 MILLIGRAM(S): 400 CAPSULE ORAL at 21:09

## 2023-03-29 RX ADMIN — Medication 500 MILLIGRAM(S): at 05:05

## 2023-03-29 RX ADMIN — INSULIN GLARGINE 38 UNIT(S): 100 INJECTION, SOLUTION SUBCUTANEOUS at 21:11

## 2023-03-29 RX ADMIN — Medication 12.5 MILLIGRAM(S): at 17:22

## 2023-03-29 RX ADMIN — Medication 650 MILLIGRAM(S): at 23:57

## 2023-03-29 RX ADMIN — Medication 1000 MILLIGRAM(S): at 04:47

## 2023-03-29 RX ADMIN — Medication 1000 MILLIGRAM(S): at 14:51

## 2023-03-29 RX ADMIN — INSULIN HUMAN 3 UNIT(S)/HR: 100 INJECTION, SOLUTION SUBCUTANEOUS at 21:13

## 2023-03-29 RX ADMIN — ENOXAPARIN SODIUM 40 MILLIGRAM(S): 100 INJECTION SUBCUTANEOUS at 13:01

## 2023-03-29 RX ADMIN — POLYETHYLENE GLYCOL 3350 17 GRAM(S): 17 POWDER, FOR SOLUTION ORAL at 13:01

## 2023-03-29 RX ADMIN — Medication 400 MILLIGRAM(S): at 04:32

## 2023-03-29 RX ADMIN — Medication 650 MILLIGRAM(S): at 17:23

## 2023-03-29 RX ADMIN — Medication 500 MILLIGRAM(S): at 17:23

## 2023-03-29 RX ADMIN — AMIODARONE HYDROCHLORIDE 400 MILLIGRAM(S): 400 TABLET ORAL at 05:05

## 2023-03-29 RX ADMIN — GABAPENTIN 100 MILLIGRAM(S): 400 CAPSULE ORAL at 05:05

## 2023-03-29 RX ADMIN — Medication 125 MILLILITER(S): at 08:30

## 2023-03-29 RX ADMIN — Medication 100 MILLIGRAM(S): at 04:59

## 2023-03-29 RX ADMIN — CLOPIDOGREL BISULFATE 75 MILLIGRAM(S): 75 TABLET, FILM COATED ORAL at 13:00

## 2023-03-29 RX ADMIN — HYDROMORPHONE HYDROCHLORIDE 30 MILLILITER(S): 2 INJECTION INTRAMUSCULAR; INTRAVENOUS; SUBCUTANEOUS at 07:19

## 2023-03-29 RX ADMIN — INSULIN HUMAN 3 UNIT(S)/HR: 100 INJECTION, SOLUTION SUBCUTANEOUS at 13:01

## 2023-03-29 RX ADMIN — GABAPENTIN 100 MILLIGRAM(S): 400 CAPSULE ORAL at 13:01

## 2023-03-29 RX ADMIN — SENNA PLUS 2 TABLET(S): 8.6 TABLET ORAL at 21:10

## 2023-03-29 RX ADMIN — ONDANSETRON 4 MILLIGRAM(S): 8 TABLET, FILM COATED ORAL at 00:12

## 2023-03-29 RX ADMIN — Medication 125 MILLILITER(S): at 06:05

## 2023-03-29 RX ADMIN — Medication 400 MILLIGRAM(S): at 13:06

## 2023-03-29 RX ADMIN — Medication 81 MILLIGRAM(S): at 13:00

## 2023-03-29 RX ADMIN — ATORVASTATIN CALCIUM 80 MILLIGRAM(S): 80 TABLET, FILM COATED ORAL at 21:11

## 2023-03-29 RX ADMIN — AMIODARONE HYDROCHLORIDE 400 MILLIGRAM(S): 400 TABLET ORAL at 17:23

## 2023-03-29 NOTE — PROGRESS NOTE ADULT - SUBJECTIVE AND OBJECTIVE BOX
Patient seen and examined at the bedside.    Remained critically ill on continuous ICU monitoring.      Brief Summary:  46 y/o F with CAD s/p CABG 03/28/23       24 Hour events:      OBJECTIVE:  Vital Signs Last 24 Hrs  T(C): 36.9 (29 Mar 2023 04:00), Max: 36.9 (29 Mar 2023 04:00)  T(F): 98.4 (29 Mar 2023 04:00), Max: 98.4 (29 Mar 2023 04:00)  HR: 73 (29 Mar 2023 07:00) (55 - 87)  BP: 88/50 (29 Mar 2023 05:45) (88/50 - 110/71)  BP(mean): 63 (29 Mar 2023 05:45) (63 - 96)  RR: 12 (29 Mar 2023 07:00) (8 - 29)  SpO2: 100% (29 Mar 2023 07:00) (99% - 100%)    Parameters below as of 29 Mar 2023 04:00  Patient On (Oxygen Delivery Method): nasal cannula  O2 Flow (L/min): 4                  Physical Exam:   General: Mildly obese female, some splinting at rest  Neurology: without focal deficit  Eyes: bilateral pupils equal and reactive   ENT/Neck: Neck supple, trachea midline, No JVD   Respiratory: Clear bilaterally   CV: S1S2, no murmurs        [x] left thoracotomy dressing, [x] L Pleural CT        [x] Sinus rhythm  Abdominal: Soft, NT, ND +BS  Extremities: No pedal edema noted, + peripheral pulses   Skin: No Rashes, Hematoma, Ecchymosis                                -------------------------------------------------------------------------------------------------------------------------------    Labs:                        8.9    13.66 )-----------( 220      ( 29 Mar 2023 00:35 )             28.3     03-29    136  |  102  |  9   ----------------------------<  103<H>  4.3   |  24  |  0.41<L>    Ca    8.8      29 Mar 2023 00:35  Phos  4.1     03-29  Mg     2.3     03-29    TPro  6.3  /  Alb  4.0  /  TBili  0.5  /  DBili  x   /  AST  18  /  ALT  16  /  AlkPhos  53  03-29    LIVER FUNCTIONS - ( 29 Mar 2023 00:35 )  Alb: 4.0 g/dL / Pro: 6.3 g/dL / ALK PHOS: 53 U/L / ALT: 16 U/L / AST: 18 U/L / GGT: x           PT/INR - ( 29 Mar 2023 00:35 )   PT: 15.5 sec;   INR: 1.34 ratio         PTT - ( 29 Mar 2023 00:35 )  PTT:28.6 sec  ABG - ( 29 Mar 2023 06:30 )  pH, Arterial: 7.35  pH, Blood: x     /  pCO2: 43    /  pO2: 176   / HCO3: 24    / Base Excess: -1.8  /  SaO2: 99.6        ------------------------------------------------------------------------------------------------------------------------------  Assessment:  46yo F presenting to the ED yesterday with a HA over last couple days.    CAD s/p CABG 03/28/23   Hemodynamic instability   Hypovolemia       Plan:   ***Neuro***  Post operative neuro assessment when sedation / anesthesia has cleared.  Postoperative acute pain control with Tylenol, Gabapentin, PRN Oxycodone, and PRN Dilaudid for pain management.     ***Cardiovascular***  Invasive hemodynamic monitoring, assess perfusion indices   At high risk for hemodynamic instability and cardiac arrhythmias.  IVF for perioperative hypovolemia.  Wean pressors for MAP > 65 mm Hg   ASA daily   Amiodarone for A. Fib prophylaxis   Monitor chest tube output.      ***Pulmonary***  Deep breathing and coughing exercises.  Wean oxygen as able.      ***GI***  Tolerating Diet   Protein calorie malnutrition - Tube feeds via nasal feeding tube.   Protonix for stress ulcer prophylaxis   Bowel Regimen   Zofran PRN for nausea/vomiting     ***Renal***  Tolliver catheter for strict I/O measurements.   Replete electrolytes as indicated.      ***ID***  No antibiotic coverage.       ***Endocrine***  Stress Hyperglycemia  Insulin as needed to maintain euglycemia.    ***Hematology***  Acute blood loss anemia - no current transfusion indication         Patient requires continuous monitoring with bedside rhythm monitoring, pulse oximetry monitoring, and continuous central venous and arterial pressure monitoring; and intermittent blood gas analysis. Care plan discussed with the ICU care team.   Patient remained critical, at risk for life threatening decompensation.    I have spent 30 minutes providing critical care management to this patient.    By signing my name below, I, Todd Cast, attest that this documentation has been prepared under the direction and in the presence of Roma Kaplan MD  Electronically signed: Todd Cast, 03-29-23 @ 07:25    I, Roma Kaplan MD, personally performed the services described in this documentation. all medical record entries made by the scribe were at my direction and in my presence. I have reviewed the chart and agree that the record reflects my personal performance and is accurate and complete  Electronically signed: Roma Kaplan MD Patient seen and examined at the bedside.    Remained critically ill on continuous ICU monitoring.      Brief Summary:  46 y/o F with CAD s/p CABG 03/28/23       24 Hour events:  - Currently off Levo  - One dose of Albumin given  - Plan to start Statin       OBJECTIVE:  Vital Signs Last 24 Hrs  T(C): 36.9 (29 Mar 2023 04:00), Max: 36.9 (29 Mar 2023 04:00)  T(F): 98.4 (29 Mar 2023 04:00), Max: 98.4 (29 Mar 2023 04:00)  HR: 73 (29 Mar 2023 07:00) (55 - 87)  BP: 88/50 (29 Mar 2023 05:45) (88/50 - 110/71)  BP(mean): 63 (29 Mar 2023 05:45) (63 - 96)  RR: 12 (29 Mar 2023 07:00) (8 - 29)  SpO2: 100% (29 Mar 2023 07:00) (99% - 100%)    Parameters below as of 29 Mar 2023 04:00  Patient On (Oxygen Delivery Method): nasal cannula  O2 Flow (L/min): 4                  Physical Exam:   General: Mildly obese female, some splinting at rest  Neurology: without focal deficit  Eyes: bilateral pupils equal and reactive   ENT/Neck: Neck supple, trachea midline, No JVD   Respiratory: Clear bilaterally   CV: S1S2, no murmurs        [x] left thoracotomy dressing, [x] L Pleural CT        [x] Sinus rhythm  Abdominal: Soft, NT, ND +BS  Extremities: No pedal edema noted, + peripheral pulses   Skin: No Rashes, Hematoma, Ecchymosis                                -------------------------------------------------------------------------------------------------------------------------------    Labs:                        8.9    13.66 )-----------( 220      ( 29 Mar 2023 00:35 )             28.3     03-29    136  |  102  |  9   ----------------------------<  103<H>  4.3   |  24  |  0.41<L>    Ca    8.8      29 Mar 2023 00:35  Phos  4.1     03-29  Mg     2.3     03-29    TPro  6.3  /  Alb  4.0  /  TBili  0.5  /  DBili  x   /  AST  18  /  ALT  16  /  AlkPhos  53  03-29    LIVER FUNCTIONS - ( 29 Mar 2023 00:35 )  Alb: 4.0 g/dL / Pro: 6.3 g/dL / ALK PHOS: 53 U/L / ALT: 16 U/L / AST: 18 U/L / GGT: x           PT/INR - ( 29 Mar 2023 00:35 )   PT: 15.5 sec;   INR: 1.34 ratio         PTT - ( 29 Mar 2023 00:35 )  PTT:28.6 sec  ABG - ( 29 Mar 2023 06:30 )  pH, Arterial: 7.35  pH, Blood: x     /  pCO2: 43    /  pO2: 176   / HCO3: 24    / Base Excess: -1.8  /  SaO2: 99.6        ------------------------------------------------------------------------------------------------------------------------------  Assessment:  46yo F presenting to the ED yesterday with a HA over last couple days.    CAD s/p CABG 03/28/23   Hemodynamic instability   Hypovolemia       Plan:   ***Neuro***  Post operative neuro assessment when sedation / anesthesia has cleared.  Postoperative acute pain control with Tylenol, Gabapentin, PRN Oxycodone, and PRN Dilaudid for pain management.     ***Cardiovascular***  Invasive hemodynamic monitoring, assess perfusion indices   At high risk for hemodynamic instability and cardiac arrhythmias.  IVF for perioperative hypovolemia.  Wean pressors for MAP > 65 mm Hg   ASA daily   Amiodarone for A. Fib prophylaxis   Monitor chest tube output.      ***Pulmonary***  Deep breathing and coughing exercises.  Wean oxygen as able.      ***GI***  Tolerating Diet   Protein calorie malnutrition - Tube feeds via nasal feeding tube.   Protonix for stress ulcer prophylaxis   Bowel Regimen   Zofran PRN for nausea/vomiting     ***Renal***  Tolliver catheter for strict I/O measurements.   Replete electrolytes as indicated.      ***ID***  No antibiotic coverage.       ***Endocrine***  Stress Hyperglycemia  Insulin as needed to maintain euglycemia.    ***Hematology***  Acute blood loss anemia - no current transfusion indication         Patient requires continuous monitoring with bedside rhythm monitoring, pulse oximetry monitoring, and continuous central venous and arterial pressure monitoring; and intermittent blood gas analysis. Care plan discussed with the ICU care team.   Patient remained critical, at risk for life threatening decompensation.    I have spent 30 minutes providing critical care management to this patient.    By signing my name below, I, Todd Cast, attest that this documentation has been prepared under the direction and in the presence of Roma Kaplan MD  Electronically signed: Todd Cast, 03-29-23 @ 07:25    I, Roma Kaplan MD, personally performed the services described in this documentation. all medical record entries made by the scribe were at my direction and in my presence. I have reviewed the chart and agree that the record reflects my personal performance and is accurate and complete  Electronically signed: Roma Kaplan MD Patient seen and examined at the bedside.    Remains critically ill on continuous ICU monitoring.      Brief Summary:  46 y/o F with CAD s/p mid-CAB 03/28/23       24 Hour events:  - Norepinephrine weaned to off   - On Insulin infusion - Endocrine consulted       Objective:  Vital Signs Last 24 Hrs  T(C): 36.9 (29 Mar 2023 04:00), Max: 36.9 (29 Mar 2023 04:00)  T(F): 98.4 (29 Mar 2023 04:00), Max: 98.4 (29 Mar 2023 04:00)  HR: 73 (29 Mar 2023 07:00) (55 - 87)  BP: 88/50 (29 Mar 2023 05:45) (88/50 - 110/71)  BP(mean): 63 (29 Mar 2023 05:45) (63 - 96)  RR: 12 (29 Mar 2023 07:00) (8 - 29)  SpO2: 100% (29 Mar 2023 07:00) (99% - 100%)    Parameters below as of 29 Mar 2023 04:00  Patient On (Oxygen Delivery Method): nasal cannula  O2 Flow (L/min): 4      Physical Exam:   General: Mildly obese female, some splinting at rest  Neurology: Awake, alert, no focal deficit  Eyes: Bilateral pupils reactive   ENT/Neck: Neck supple, No JVD   Respiratory: Clear bilaterally   CV: Sinus rhythm  Left chest tube with serosanguinous output.  Abdominal: Soft, Nontender  Extremities: No pedal edema noted, + peripheral pulses   Skin: Warm                               -------------------------------------------------------------------------------------------------------------------------------    Labs:                        8.9    13.66 )-----------( 220      ( 29 Mar 2023 00:35 )             28.3     03-29    136  |  102  |  9   ----------------------------<  103<H>  4.3   |  24  |  0.41<L>    Ca    8.8      29 Mar 2023 00:35  Phos  4.1     03-29  Mg     2.3     03-29    TPro  6.3  /  Alb  4.0  /  TBili  0.5  /  DBili  x   /  AST  18  /  ALT  16  /  AlkPhos  53  03-29    LIVER FUNCTIONS - ( 29 Mar 2023 00:35 )  Alb: 4.0 g/dL / Pro: 6.3 g/dL / ALK PHOS: 53 U/L / ALT: 16 U/L / AST: 18 U/L / GGT: x           PT/INR - ( 29 Mar 2023 00:35 )   PT: 15.5 sec;   INR: 1.34 ratio         PTT - ( 29 Mar 2023 00:35 )  PTT:28.6 sec  ABG - ( 29 Mar 2023 06:30 )  pH, Arterial: 7.35  pH, Blood: x     /  pCO2: 43    /  pO2: 176   / HCO3: 24    / Base Excess: -1.8  /  SaO2: 99.6        ------------------------------------------------------------------------------------------------------------------------------  Assessment:  46yo F with CAD s/p CABG 03/28/23     At high risk for hemodynamic instability   Hypovolemia   Postop acute respiratory insufficiency  Acute blood loss anemia  Stress hyperglycemia.      Plan:   ***Neuro***  Postoperative acute pain control with Tylenol, Gabapentin, PRN Oxycodone, and Dilaudid for pain management.     ***Cardiovascular***  At high risk for hemodynamic instability and cardiac arrhythmias.  IVF for perioperative hypovolemia.  Pressors weaned to off.  Will start beta blocker when BP allows.   ASA /Plavix / statin daily   Amiodarone for A. Fib prophylaxis   Monitor chest tube output.    ***Pulmonary***  Deep breathing and coughing exercises.  Wean oxygen as able.    ***GI***  Tolerating Diet   Protonix for stress ulcer prophylaxis   Bowel Regimen     ***Renal***  Trend creatinine.   Replete electrolytes as indicated.    ***ID***  No antibiotic coverage.     ***Endocrine***  Stress Hyperglycemia  Insulin infusion - plan to transition to Lantus and premeal overnight.  Appreciate Endocrine consult.    ***Hematology***  Acute blood loss anemia - no current transfusion indication   Trend CBC and monitor for bleeding.      Patient requires continuous monitoring with bedside rhythm monitoring, pulse oximetry monitoring, and continuous central venous and arterial pressure monitoring; and intermittent blood gas analysis. Care plan discussed with the ICU care team.   Patient remains critical, at risk for life threatening decompensation.    I have spent 30 minutes providing critical care management to this patient.    By signing my name below, I, Leonardaryan Segun, attest that this documentation has been prepared under the direction and in the presence of Roma Kaplan MD  Electronically signed: Todd Cast, 03-29-23 @ 07:25    I, Roma Kaplan MD, personally performed the services described in this documentation. all medical record entries made by the scribe were at my direction and in my presence. I have reviewed the chart and agree that the record reflects my personal performance and is accurate and complete  Electronically signed: Roma Kaplan MD

## 2023-03-29 NOTE — PROGRESS NOTE ADULT - SUBJECTIVE AND OBJECTIVE BOX
Patient is a 47y old  Female who presents with a chief complaint of cardiac surgery (29 Mar 2023 16:41)      SUBJECTIVE / OVERNIGHT EVENTS: c/o incisional pain   Review of Systems  chest pain no  palpitations no  sob no  nausea no  headache no    MEDICATIONS  (STANDING):  acetaminophen     Tablet .. 650 milliGRAM(s) Oral every 6 hours  aMIOdarone    Tablet 400 milliGRAM(s) Oral two times a day  ascorbic acid 500 milliGRAM(s) Oral two times a day  aspirin enteric coated 81 milliGRAM(s) Oral daily  atorvastatin 80 milliGRAM(s) Oral at bedtime  chlorhexidine 2% Cloths 1 Application(s) Topical daily  clopidogrel Tablet 75 milliGRAM(s) Oral daily  dextrose 50% Injectable 50 milliLiter(s) IV Push every 15 minutes  dextrose 50% Injectable 25 milliLiter(s) IV Push every 15 minutes  enoxaparin Injectable 40 milliGRAM(s) SubCutaneous every 24 hours  gabapentin 100 milliGRAM(s) Oral every 8 hours  HYDROmorphone PCA (1 mG/mL) 30 milliLiter(s) PCA Continuous PCA Continuous  insulin glargine Injectable (LANTUS) 38 Unit(s) SubCutaneous at bedtime  insulin lispro (ADMELOG) corrective regimen sliding scale   SubCutaneous three times a day before meals  insulin lispro (ADMELOG) corrective regimen sliding scale   SubCutaneous at bedtime  insulin lispro Injectable (ADMELOG) 8 Unit(s) SubCutaneous three times a day before meals  insulin regular Infusion 3 Unit(s)/Hr (3 mL/Hr) IV Continuous <Continuous>  metoprolol tartrate 12.5 milliGRAM(s) Oral every 12 hours  ondansetron Injectable 4 milliGRAM(s) IV Push once  pantoprazole    Tablet 40 milliGRAM(s) Oral before breakfast  polyethylene glycol 3350 17 Gram(s) Oral daily  senna 2 Tablet(s) Oral at bedtime  sodium chloride 0.9%. 1000 milliLiter(s) (10 mL/Hr) IV Continuous <Continuous>    MEDICATIONS  (PRN):  HYDROmorphone  Injectable 0.5 milliGRAM(s) IV Push every 6 hours PRN Breakthrough Pain  naloxone Injectable 0.1 milliGRAM(s) IV Push every 3 minutes PRN For ANY of the following changes in patient status:  A. RR LESS THAN 10 breaths per minute, B. Oxygen saturation LESS THAN 90%, C. Sedation score of 6  ondansetron Injectable 4 milliGRAM(s) IV Push every 6 hours PRN Nausea  oxyCODONE    IR 5 milliGRAM(s) Oral every 4 hours PRN Moderate Pain (4 - 6)  oxyCODONE    IR 10 milliGRAM(s) Oral every 4 hours PRN Severe Pain (7 - 10)      Vital Signs Last 24 Hrs  T(C): 36.3 (29 Mar 2023 20:00), Max: 37.1 (29 Mar 2023 12:00)  T(F): 97.3 (29 Mar 2023 20:00), Max: 98.8 (29 Mar 2023 12:00)  HR: 99 (29 Mar 2023 21:00) (61 - 103)  BP: 125/60 (29 Mar 2023 17:00) (88/50 - 130/67)  BP(mean): 86 (29 Mar 2023 17:00) (63 - 93)  RR: 23 (29 Mar 2023 21:00) (10 - 27)  SpO2: 100% (29 Mar 2023 21:00) (94% - 100%)    Parameters below as of 29 Mar 2023 20:00  Patient On (Oxygen Delivery Method): nasal cannula  O2 Flow (L/min): 4      PHYSICAL EXAM:  GENERAL: NAD, well-developed  HEAD:  Atraumatic, Normocephalic  EYES: EOMI, PERRLA, conjunctiva and sclera clear  NECK: Supple, No JVD  CHEST/LUNG: Clear to auscultation bilaterally; No wheeze Incisions clean CT in place   HEART: Regular rate and rhythm; No murmurs, rubs, or gallops  ABDOMEN: Soft, Nontender, Nondistended; Bowel sounds present  EXTREMITIES:  2+ Peripheral Pulses, No clubbing, cyanosis, or edema  PSYCH: AAOx3  NEUROLOGY: non-focal  SKIN: No rashes or lesions    LABS:                        8.9    13.66 )-----------( 220      ( 29 Mar 2023 00:35 )             28.3     03-29    136  |  102  |  9   ----------------------------<  103<H>  4.3   |  24  |  0.41<L>    Ca    8.8      29 Mar 2023 00:35  Phos  4.1     03-29  Mg     2.3     03-29    TPro  6.3  /  Alb  4.0  /  TBili  0.5  /  DBili  x   /  AST  18  /  ALT  16  /  AlkPhos  53  03-29    PT/INR - ( 29 Mar 2023 00:35 )   PT: 15.5 sec;   INR: 1.34 ratio         PTT - ( 29 Mar 2023 00:35 )  PTT:28.6 sec  CARDIAC MARKERS ( 28 Mar 2023 14:14 )  x     / x     / 192 U/L / x     / 2.2 ng/mL            RADIOLOGY & ADDITIONAL TESTS:    Imaging Personally Reviewed:    Consultant(s) Notes Reviewed:      Care Discussed with Consultants/Other Providers:

## 2023-03-29 NOTE — PROGRESS NOTE ADULT - SUBJECTIVE AND OBJECTIVE BOX
Day 1 of Anesthesia Pain Management Service    SUBJECTIVE: I'm ok    Pain Scale Score:	[X] Refer to charted pain scores    THERAPY:    [ ] IV PCA Morphine		[ ] 5 mg/mL	[ ] 1 mg/mL  [X] IV PCA Hydromorphone	[ ] 5 mg/mL	[X] 1 mg/mL  [ ] IV PCA Fentanyl		[ ] 50 micrograms/mL    Demand dose: 0.2 mg     Lockout: 6 minutes   Continuous Rate: 0 mg/hr  4 Hour Limit: 4 mg    MEDICATIONS  (STANDING):  acetaminophen     Tablet .. 650 milliGRAM(s) Oral every 6 hours  albumin human  5% IVPB 250 milliLiter(s) IV Intermittent once  aMIOdarone    Tablet 400 milliGRAM(s) Oral two times a day  ascorbic acid 500 milliGRAM(s) Oral two times a day  aspirin enteric coated 81 milliGRAM(s) Oral daily  chlorhexidine 2% Cloths 1 Application(s) Topical daily  clopidogrel Tablet 75 milliGRAM(s) Oral daily  gabapentin 100 milliGRAM(s) Oral every 8 hours  HYDROmorphone PCA (1 mG/mL) 30 milliLiter(s) PCA Continuous PCA Continuous  insulin regular Infusion 3 Unit(s)/Hr (3 mL/Hr) IV Continuous <Continuous>  lactated ringers Bolus 500 milliLiter(s) IV Bolus once  norepinephrine Infusion 0.04 MICROgram(s)/kG/Min (5.85 mL/Hr) IV Continuous <Continuous>  ondansetron Injectable 4 milliGRAM(s) IV Push once  pantoprazole  Injectable 40 milliGRAM(s) IV Push daily  polyethylene glycol 3350 17 Gram(s) Oral daily  senna 2 Tablet(s) Oral at bedtime  sodium chloride 0.9%. 1000 milliLiter(s) (75 mL/Hr) IV Continuous <Continuous>  sodium chloride 0.9%. 1000 milliLiter(s) (10 mL/Hr) IV Continuous <Continuous>    MEDICATIONS  (PRN):  HYDROmorphone  Injectable 0.5 milliGRAM(s) IV Push every 6 hours PRN Breakthrough Pain  naloxone Injectable 0.1 milliGRAM(s) IV Push every 3 minutes PRN For ANY of the following changes in patient status:  A. RR LESS THAN 10 breaths per minute, B. Oxygen saturation LESS THAN 90%, C. Sedation score of 6  ondansetron Injectable 4 milliGRAM(s) IV Push every 6 hours PRN Nausea  oxyCODONE    IR 5 milliGRAM(s) Oral every 4 hours PRN Moderate Pain (4 - 6)  oxyCODONE    IR 10 milliGRAM(s) Oral every 4 hours PRN Severe Pain (7 - 10)      OBJECTIVE:    Sedation Score:	[ ] Alert 	[X ] Drowsy 	[ ] Arousable	[ ] Asleep	[ ] Unresponsive    Side Effects:	[X ] None	[ ] Nausea	[ ] Vomiting	[ ] Pruritus  		[ ] Other:    Vital Signs Last 24 Hrs  T(C): 36.8 (29 Mar 2023 08:00), Max: 36.9 (29 Mar 2023 04:00)  T(F): 98.2 (29 Mar 2023 08:00), Max: 98.4 (29 Mar 2023 04:00)  HR: 78 (29 Mar 2023 09:15) (55 - 94)  BP: 130/67 (29 Mar 2023 09:00) (88/50 - 130/67)  BP(mean): 92 (29 Mar 2023 09:00) (63 - 93)  RR: 17 (29 Mar 2023 09:15) (8 - 29)  SpO2: 100% (29 Mar 2023 09:15) (95% - 100%)    Parameters below as of 29 Mar 2023 08:00  Patient On (Oxygen Delivery Method): nasal cannula  O2 Flow (L/min): 4      ASSESSMENT/ PLAN    Therapy to  be:               [X] Continued   [ ] Discontinued   [ ] Changed to PRN Analgesics    Documentation and Verification of current medications:   [X] Done	[ ] Not done, not eligible    Comments: Some disorientation last night. Consider decreasing demand dosing.

## 2023-03-29 NOTE — CONSULT NOTE ADULT - PROBLEM SELECTOR RECOMMENDATION 2
On medications,  no chest pain, stable, monitored and followed up by primary cardiothoracic team/cardiology team.  s.p  CABG
A1c 8.3  Please consult [facility] endocrine for diabetes management  Continue AC/HS FS  Continue diabetic diet

## 2023-03-29 NOTE — PHYSICAL THERAPY INITIAL EVALUATION ADULT - ADDITIONAL COMMENTS
Pt lives with spouse in pvt house with 3 steps to enter and FOS to bedroom/bath. Pt was independent prior to admission without AD.

## 2023-03-29 NOTE — PROGRESS NOTE ADULT - SUBJECTIVE AND OBJECTIVE BOX
SUBJ:  CABG yesterday  off levo  plan to start statin today    MEDICATIONS  (STANDING):  acetaminophen     Tablet .. 650 milliGRAM(s) Oral every 6 hours  aMIOdarone    Tablet 400 milliGRAM(s) Oral two times a day  ascorbic acid 500 milliGRAM(s) Oral two times a day  aspirin enteric coated 81 milliGRAM(s) Oral daily  atorvastatin 80 milliGRAM(s) Oral at bedtime  chlorhexidine 2% Cloths 1 Application(s) Topical daily  clopidogrel Tablet 75 milliGRAM(s) Oral daily  enoxaparin Injectable 40 milliGRAM(s) SubCutaneous every 24 hours  gabapentin 100 milliGRAM(s) Oral every 8 hours  HYDROmorphone PCA (1 mG/mL) 30 milliLiter(s) PCA Continuous PCA Continuous  insulin glargine Injectable (LANTUS) 38 Unit(s) SubCutaneous at bedtime  insulin lispro (ADMELOG) corrective regimen sliding scale   SubCutaneous three times a day before meals  insulin lispro (ADMELOG) corrective regimen sliding scale   SubCutaneous at bedtime  insulin lispro Injectable (ADMELOG) 8 Unit(s) SubCutaneous three times a day before meals  insulin regular Infusion 3 Unit(s)/Hr (3 mL/Hr) IV Continuous <Continuous>  lactated ringers Bolus 500 milliLiter(s) IV Bolus once  norepinephrine Infusion 0.04 MICROgram(s)/kG/Min (5.85 mL/Hr) IV Continuous <Continuous>  ondansetron Injectable 4 milliGRAM(s) IV Push once  pantoprazole    Tablet 40 milliGRAM(s) Oral before breakfast  polyethylene glycol 3350 17 Gram(s) Oral daily  senna 2 Tablet(s) Oral at bedtime  sodium chloride 0.9%. 1000 milliLiter(s) (75 mL/Hr) IV Continuous <Continuous>  sodium chloride 0.9%. 1000 milliLiter(s) (10 mL/Hr) IV Continuous <Continuous>    MEDICATIONS  (PRN):  HYDROmorphone  Injectable 0.5 milliGRAM(s) IV Push every 6 hours PRN Breakthrough Pain  naloxone Injectable 0.1 milliGRAM(s) IV Push every 3 minutes PRN For ANY of the following changes in patient status:  A. RR LESS THAN 10 breaths per minute, B. Oxygen saturation LESS THAN 90%, C. Sedation score of 6  ondansetron Injectable 4 milliGRAM(s) IV Push every 6 hours PRN Nausea  oxyCODONE    IR 5 milliGRAM(s) Oral every 4 hours PRN Moderate Pain (4 - 6)  oxyCODONE    IR 10 milliGRAM(s) Oral every 4 hours PRN Severe Pain (7 - 10)      Vital Signs Last 24 Hrs  T(C): 37.1 (29 Mar 2023 12:00), Max: 37.1 (29 Mar 2023 12:00)  T(F): 98.8 (29 Mar 2023 12:00), Max: 98.8 (29 Mar 2023 12:00)  HR: 82 (29 Mar 2023 14:00) (58 - 103)  BP: 118/64 (29 Mar 2023 10:00) (88/50 - 130/67)  BP(mean): 85 (29 Mar 2023 10:00) (63 - 93)  RR: 21 (29 Mar 2023 14:00) (8 - 27)  SpO2: 100% (29 Mar 2023 14:00) (95% - 100%)    Parameters below as of 29 Mar 2023 12:00  Patient On (Oxygen Delivery Method): nasal cannula  O2 Flow (L/min): 4      REVIEW OF SYSTEMS:  CONSTITUTIONAL: No fever, weight loss, or fatigue  EYES: No eye pain, visual disturbances, or discharge  ENMT:  No difficulty hearing, tinnitus, vertigo; No sinus or throat pain  NECK: No pain or stiffness  RESPIRATORY: No cough, wheezing, chills or hemoptysis; No shortness of breath  CARDIOVASCULAR: No chest pain, palpitations, dizziness, or leg swelling  GASTROINTESTINAL: No abdominal or epigastric pain. No nausea, vomiting, or hematemesis; No diarrhea or constipation. No melena or hematochezia.  GENITOURINARY: No dysuria, frequency, hematuria, or incontinence  NEUROLOGICAL: No headaches, memory loss, loss of strength, numbness, or tremors  SKIN: No itching, burning, rashes, or lesions   LYMPH NODES: No enlarged glands  ENDOCRINE: No heat or cold intolerance; No hair loss  MUSCULOSKELETAL: No joint pain or swelling; No muscle, back, or extremity pain  PSYCHIATRIC: No depression, anxiety, mood swings, or difficulty sleeping  HEME/LYMPH: No easy bruising, or bleeding gums  ALLERY AND IMMUNOLOGIC: No hives or eczema          PHYSICAL EXAM:  · CONSTITUTIONAL: Well-developed, well nourished      · EYES: EOMI; PERRL; no drainage or redness  · NECK: No bruits; no thyromegaly or nodules  ·RESPIRATORY:   airway patent; breath sounds equal; good air movement; respirations non-labored; clear to auscultation bilaterally; no chest wall tenderness; no intercostal retractions; no rales,rhonchi or wheeze  · CARDIOVASCULAR: regular rate and rhythm  no rub  no murmur  normal PMI  . GASTROINTESTINAL:  no distention; no masses palpable; bowel sounds normal  · EXTREMITIES: No cyanosis, clubbing or edema  · VASCULAR:  Equal and normal pulses (radial, femoral)  ·NEUROLOGICAL:  Alert and oriented x 3; sensation intact; deep reflexes intact; cranial nerves intact; no spontaneous movement; superficial reflexes intact; normal strength  · SKIN: No lesions; no rash  . LYMPH NODES: No lymphadedenopathy  · MUSCULOSKELETAL:  No calf tenderness  no joint swelling	    TELEMETRY:    ECG:    TTE:    LABS:   CBC Full  -  ( 29 Mar 2023 00:35 )  WBC Count : 13.66 K/uL  RBC Count : 3.33 M/uL  Hemoglobin : 8.9 g/dL  Hematocrit : 28.3 %  Platelet Count - Automated : 220 K/uL  Mean Cell Volume : 85.0 fl  Mean Cell Hemoglobin : 26.7 pg  Mean Cell Hemoglobin Concentration : 31.4 gm/dL  Auto Neutrophil # : 10.49 K/uL  Auto Lymphocyte # : 2.19 K/uL  Auto Monocyte # : 0.89 K/uL  Auto Eosinophil # : 0.01 K/uL  Auto Basophil # : 0.03 K/uL  Auto Neutrophil % : 76.8 %  Auto Lymphocyte % : 16.0 %  Auto Monocyte % : 6.5 %  Auto Eosinophil % : 0.1 %  Auto Basophil % : 0.2 %      03-29    136  |  102  |  9   ----------------------------<  103<H>  4.3   |  24  |  0.41<L>    Ca    8.8      29 Mar 2023 00:35  Phos  4.1     03-29  Mg     2.3     03-29    TPro  6.3  /  Alb  4.0  /  TBili  0.5  /  DBili  x   /  AST  18  /  ALT  16  /  AlkPhos  53  03-29    CARDIAC MARKERS ( 28 Mar 2023 14:14 )  x     / x     / 192 U/L / x     / 2.2 ng/mL        RADIOLOGY & ADDITIONAL STUDIES:    IMPRESSION AND PLAN:      RAGHAV LAINEZ is a 46y/o Female with PMHx of DM2 on metformin who presentd with CP found to have severe obstruction of the pLAD. Pt now s/p LHC demonstrating 100% stenosis in ostial LAD    successful CABG  3/28    CAD  for CABG  early onset CAD  LDL not at level consistent with familial hyperlipidemia  check LPa  continue   aspirin enteric coated 81 milliGRAM(s) Oral daily  atorvastatin 80 milliGRAM(s) Oral at bedtime  clopidogrel Tablet 75 milliGRAM(s) Oral daily      aMIOdarone    Tablet 400 milliGRAM(s) Oral two times a day      She will need an outpt cardiologist in [place]    Gustavo Espitia MD, PhD  Cardiology Attending  [facility]/ [facility]    For day time coverage Mon-Fri see Non-Service Consult Attending on [email or web address], password: [email or web address]; daytime weekends covered by general cardiology consult service attending.)

## 2023-03-29 NOTE — PHYSICAL THERAPY INITIAL EVALUATION ADULT - DID THE PATIENT HAVE SURGERY?
Minimally invasive direct coronary artery bypass (MIDCAB) with transesophageal echocardiography (DAVINA) 28-Mar-2023 14:25:02  Isael Stein./yes

## 2023-03-29 NOTE — OCCUPATIONAL THERAPY INITIAL EVALUATION ADULT - PERTINENT HX OF CURRENT PROBLEM, REHAB EVAL
47yoF presenting to the ED yesterday with a HA over last couple days.   During her evaluation she noted that she has had several episodes of intermittent left sided CP over the last couple days. States that it is exertional in nature and radiates to her left arm. States that she can walk about 1/2block before she starts to have symptoms. Prior to this she reports that she had a couple short lived episodes [place] but her ECG's were normal so no further evaluation was done. Her ECG was NSR without ischemic findings and her Trop T was <6. s/p Minimally invasive direct coronary artery bypass (MIDCAB) with transesophageal echocardiography (DAVINA) 3/28
(0) indicator not present

## 2023-03-29 NOTE — PROGRESS NOTE ADULT - ASSESSMENT
47 f with    CAD/ Chest pain/ Abnormal CT coronaries  / s/p Robotic CABG  - telemetry  - ASA  - BB  - Chest tube   - Cardiology follow  - CTu care     Diabetes Mellitus  - BS control  - ADA diet     DVT prophylaxis  - low risk     Jan Mitchell MD phone [phone]

## 2023-03-29 NOTE — OCCUPATIONAL THERAPY INITIAL EVALUATION ADULT - ORIENTATION, REHAB EVAL
Pt complaining of nausea. AMBER Lemons notified and aware   oriented to person, place, time and situation

## 2023-03-29 NOTE — CONSULT NOTE ADULT - ASSESSMENT
Assessment  DMT2: 47y Female with DM T2 with hyperglycemia, A1C 8.3% , was on oral meds at home, s/p surgery, transitioned off insulin drip now on basal bolus insulin with coverage, eating partial meals.   CAD: on medications, stable, monitored. s/p mid CABG, [facility] global care.   ?Hypothyroidism: Not on supplemental synthroid. TSH mildly elevated 4.37, pending free T4  HTN: on antihypertensive medications, monitored, asymptomatic.        Discussed plan and management wit Dr Jimi Krause MD  Cell: [phone]  Office: [phone]

## 2023-03-29 NOTE — PHYSICAL THERAPY INITIAL EVALUATION ADULT - GENERAL OBSERVATIONS, REHAB EVAL
Pt received seated in chair in NAD, +A-line, +R IJ, +CT, +O2 4L NC, +Tolliver, +ICU monitoring. Pt AOx4 pleasant and cooperative.

## 2023-03-29 NOTE — CONSULT NOTE ADULT - PROBLEM SELECTOR RECOMMENDATION 9
Continue preop cardiac surgery workup  Check PFTs, US Carotids, and TTE  Continue ASA 81mg, ToprolXL 50 BID and atorvastatin 80mg daily  Dr. Nix to review cath imaging, pt is requesting PCI if candidate
STOP IV insulin  Will start on Lantus 30 units at bed time.  Will start Admelog 8 units before each meal in addition to Admelog correction scale coverage.  Will continue monitoring FS, log, and glucose trends, will Follow up.  Patient counseled for compliance with consistent low carb diet and exercise as tolerated outpatient.

## 2023-03-29 NOTE — CONSULT NOTE ADULT - SUBJECTIVE AND OBJECTIVE BOX
HPI:  47yoF presenting to the ED yesterday with a HA over last couple days.   During her evaluation she noted that she has had several episodes of intermittent left sided CP over the last couple days. States that it is exertional in nature and radiates to her left arm. States that she can walk about 1/2block before she starts to have symptoms. Prior to this she reports that she had a couple short lived episodes of [place] ago [place] but her ECG's were normal so no further evaluation was done. She denies LE edema, orthopnea, PND.   Her ECG was NSR without ischemic findings and her Trop T was <6.   As a result she was ordered for a CTa coronaries which is concerning for a severe obstruction of the pLAD.  (18 Mar 2023 20:02)      Patient has history of diabetes, A1C  8.3 % on home Metformin.   Endo was consulted for glycemic control.      PAST MEDICAL & SURGICAL HISTORY:  DM (diabetes mellitus)  On metformin      COVID-19 vaccine series completed      H/O  section          FAMILY HISTORY:  FH: heart disease (Father)        Social History:  Social History:    Marital Status:  ( x  )    (   ) Single    (   )    (  )   Occupation:   Lives with: (  ) alone  (  ) children   ( x ) spouse   (  ) parents  (  ) other    Substance Use (street drugs): (x ) never used  (  ) other:  Tobacco Usage:  (  x ) never smoked   (   ) former smoker   (   ) current smoker  (     ) pack years  (        ) last cigarette date  Alcohol Usage: no            HOME MEDICATIONS:  Home Medications:  Excedrin oral tablet: 2 tab(s) orally , As Needed - for headache (18 Mar 2023 19:25)  melatonin 5 mg oral tablet: 1 tab(s) orally once a day (at bedtime), As Needed (18 Mar 2023 19:25)  metFORMIN 500 mg oral tablet: 1 tab(s) orally 2 times a day (18 Mar 2023 19:25)            MEDICATIONS  (STANDING):  acetaminophen     Tablet .. 650 milliGRAM(s) Oral every 6 hours  aMIOdarone    Tablet 400 milliGRAM(s) Oral two times a day  ascorbic acid 500 milliGRAM(s) Oral two times a day  aspirin enteric coated 81 milliGRAM(s) Oral daily  atorvastatin 80 milliGRAM(s) Oral at bedtime  chlorhexidine 2% Cloths 1 Application(s) Topical daily  clopidogrel Tablet 75 milliGRAM(s) Oral daily  enoxaparin Injectable 40 milliGRAM(s) SubCutaneous every 24 hours  gabapentin 100 milliGRAM(s) Oral every 8 hours  HYDROmorphone PCA (1 mG/mL) 30 milliLiter(s) PCA Continuous PCA Continuous  insulin glargine Injectable (LANTUS) 38 Unit(s) SubCutaneous at bedtime  insulin lispro (ADMELOG) corrective regimen sliding scale   SubCutaneous three times a day before meals  insulin lispro (ADMELOG) corrective regimen sliding scale   SubCutaneous at bedtime  insulin lispro Injectable (ADMELOG) 8 Unit(s) SubCutaneous three times a day before meals  lactated ringers Bolus 500 milliLiter(s) IV Bolus once  norepinephrine Infusion 0.04 MICROgram(s)/kG/Min (5.85 mL/Hr) IV Continuous <Continuous>  ondansetron Injectable 4 milliGRAM(s) IV Push once  pantoprazole    Tablet 40 milliGRAM(s) Oral before breakfast  polyethylene glycol 3350 17 Gram(s) Oral daily  senna 2 Tablet(s) Oral at bedtime  sodium chloride 0.9%. 1000 milliLiter(s) (75 mL/Hr) IV Continuous <Continuous>  sodium chloride 0.9%. 1000 milliLiter(s) (10 mL/Hr) IV Continuous <Continuous>    MEDICATIONS  (PRN):  HYDROmorphone  Injectable 0.5 milliGRAM(s) IV Push every 6 hours PRN Breakthrough Pain  naloxone Injectable 0.1 milliGRAM(s) IV Push every 3 minutes PRN For ANY of the following changes in patient status:  A. RR LESS THAN 10 breaths per minute, B. Oxygen saturation LESS THAN 90%, C. Sedation score of 6  ondansetron Injectable 4 milliGRAM(s) IV Push every 6 hours PRN Nausea  oxyCODONE    IR 5 milliGRAM(s) Oral every 4 hours PRN Moderate Pain (4 - 6)  oxyCODONE    IR 10 milliGRAM(s) Oral every 4 hours PRN Severe Pain (7 - 10)      Allergies    No Known Allergies    Intolerances        Review of Systems:  Neuro: No HA, no dizziness  Cardiovascular: No chest pain, no palpitations  Respiratory: no SOB, no cough  GI: No nausea, vomiting, abdominal pain  MSK: Denies joint/muscle pain      ALL OTHER SYSTEMS REVIEWED AND NEGATIVE      PHYSICAL EXAM:  VITALS: T(C): 37.1 (03-29-23 @ 12:00)  T(F): 98.8 (23 @ 12:00), Max: 98.8 (23 @ 12:00)  HR: 73 (23 @ 15:00) (59 - 103)  BP: 118/64 (23 @ 10:00) (88/50 - 130/67)  RR:  (10 - 27)  SpO2:  (95% - 100%)  Wt(kg): --  GENERAL: NAD, well-groomed, well-developed  NEURO:  alert and oriented  RESPIRATORY: Clear to auscultation bilaterally; No rales, rhonchi, wheezing  CARDIOVASCULAR: Si S2  GI: Soft, non distended, normal bowel sounds  MUSCULOSKELETAL: Moves all extremities equally       POCT Blood Glucose.: 139 mg/dL (23 @ 15:46)  POCT Blood Glucose.: 134 mg/dL (23 @ 15:00)  POCT Blood Glucose.: 155 mg/dL (23 @ 11:59)  POCT Blood Glucose.: 173 mg/dL (23 @ 11:16)  POCT Blood Glucose.: 213 mg/dL (23 @ 09:53)  POCT Blood Glucose.: 239 mg/dL (23 @ 08:55)  POCT Blood Glucose.: 99 mg/dL (23 @ 07:58)  POCT Blood Glucose.: 106 mg/dL (23 @ 06:51)  POCT Blood Glucose.: 116 mg/dL (23 @ 05:59)  POCT Blood Glucose.: 126 mg/dL (23 @ 05:08)  POCT Blood Glucose.: 153 mg/dL (23 @ 03:59)  POCT Blood Glucose.: 119 mg/dL (23 @ 01:55)  POCT Blood Glucose.: 102 mg/dL (23 @ 01:07)  POCT Blood Glucose.: 120 mg/dL (23 @ 00:10)  POCT Blood Glucose.: 101 mg/dL (23 @ 23:00)  POCT Blood Glucose.: 101 mg/dL (03-28-23 @ 21:59)  POCT Blood Glucose.: 107 mg/dL (23 @ 21:05)  POCT Blood Glucose.: 117 mg/dL (23 @ 19:56)  POCT Blood Glucose.: 121 mg/dL (23 @ 18:53)  POCT Blood Glucose.: 119 mg/dL (23 @ 17:55)  POCT Blood Glucose.: 121 mg/dL (23 @ 17:14)  POCT Blood Glucose.: 117 mg/dL (23 @ 16:02)  POCT Blood Glucose.: 123 mg/dL (23 @ 14:59)  POCT Blood Glucose.: 214 mg/dL (23 @ 21:07)  POCT Blood Glucose.: 197 mg/dL (23 @ 16:59)  POCT Blood Glucose.: 235 mg/dL (23 @ 12:04)  POCT Blood Glucose.: 220 mg/dL (23 @ 08:27)  POCT Blood Glucose.: 251 mg/dL (23 @ 21:11)  POCT Blood Glucose.: 202 mg/dL (23 @ 16:54)                            8.9    13.66 )-----------( 220      ( 29 Mar 2023 00:35 )             28.3           136  |  102  |  9   ----------------------------<  103<H>  4.3   |  24  |  0.41<L>    eGFR: 124    Ca    8.8        Mg     2.3       Phos  4.1         TPro  6.3  /  Alb  4.0  /  TBili  0.5  /  DBili  x   /  AST  18  /  ALT  16  /  AlkPhos  53        Thyroid Function Tests:   @ 22:54 TSH 4.37 FreeT4 -- T3 82 Anti TPO -- Anti Thyroglobulin Ab -- TSI --    Diet, Regular:   Consistent Carbohydrate No Snacks (CSTCHO) (23 @ 20:58) [Active]         Chol 181 Direct LDL -- LDL calculated 108<H> HDL 42<L> Trig 157<H>  A1C with Estimated Average Glucose Result: 8.3 % (23 @ 06:23)              < from: CT Abdomen and Pelvis w/ IV Cont (18 @ 16:43) >  ADRENALS: Within normal limits.    < end of copied text >

## 2023-03-29 NOTE — PROGRESS NOTE ADULT - SUBJECTIVE AND OBJECTIVE BOX
Pain Management Attending Addendum    SUBJECTIVE: Patient doing well with IV PCA    Therapy:    [X] IV PCA         [ ] PRN Analgesics    OBJECTIVE:   [X] Pain appropriately controlled    [ ] Other:    Side Effects:  [ ] None	             [ ] Nausea              [ ] Pruritis                	[X] Other: Confusion - improved with decreased demand dosing    ASSESSMENT/PLAN: Continue current therapy    Comments:

## 2023-03-30 LAB
ALBUMIN SERPL ELPH-MCNC: 3.7 G/DL — SIGNIFICANT CHANGE UP (ref 3.3–5)
ALP SERPL-CCNC: 48 U/L — SIGNIFICANT CHANGE UP (ref 40–120)
ALT FLD-CCNC: 14 U/L — SIGNIFICANT CHANGE UP (ref 10–45)
ANION GAP SERPL CALC-SCNC: 8 MMOL/L — SIGNIFICANT CHANGE UP (ref 5–17)
AST SERPL-CCNC: 19 U/L — SIGNIFICANT CHANGE UP (ref 10–40)
BASOPHILS # BLD AUTO: 0.02 K/UL — SIGNIFICANT CHANGE UP (ref 0–0.2)
BASOPHILS NFR BLD AUTO: 0.2 % — SIGNIFICANT CHANGE UP (ref 0–2)
BILIRUB SERPL-MCNC: 0.4 MG/DL — SIGNIFICANT CHANGE UP (ref 0.2–1.2)
BUN SERPL-MCNC: 7 MG/DL — SIGNIFICANT CHANGE UP (ref 7–23)
CALCIUM SERPL-MCNC: 8.7 MG/DL — SIGNIFICANT CHANGE UP (ref 8.4–10.5)
CHLORIDE SERPL-SCNC: 104 MMOL/L — SIGNIFICANT CHANGE UP (ref 96–108)
CO2 SERPL-SCNC: 26 MMOL/L — SIGNIFICANT CHANGE UP (ref 22–31)
CREAT SERPL-MCNC: 0.51 MG/DL — SIGNIFICANT CHANGE UP (ref 0.5–1.3)
EGFR: 117 ML/MIN/1.73M2 — SIGNIFICANT CHANGE UP
EOSINOPHIL # BLD AUTO: 0.1 K/UL — SIGNIFICANT CHANGE UP (ref 0–0.5)
EOSINOPHIL NFR BLD AUTO: 1 % — SIGNIFICANT CHANGE UP (ref 0–6)
GAS PNL BLDA: SIGNIFICANT CHANGE UP
GAS PNL BLDA: SIGNIFICANT CHANGE UP
GLUCOSE BLDC GLUCOMTR-MCNC: 177 MG/DL — HIGH (ref 70–99)
GLUCOSE BLDC GLUCOMTR-MCNC: 189 MG/DL — HIGH (ref 70–99)
GLUCOSE BLDC GLUCOMTR-MCNC: 193 MG/DL — HIGH (ref 70–99)
GLUCOSE BLDC GLUCOMTR-MCNC: 203 MG/DL — HIGH (ref 70–99)
GLUCOSE BLDC GLUCOMTR-MCNC: 220 MG/DL — HIGH (ref 70–99)
GLUCOSE SERPL-MCNC: 138 MG/DL — HIGH (ref 70–99)
HCT VFR BLD CALC: 25.3 % — LOW (ref 34.5–45)
HCT VFR BLD CALC: 25.3 % — LOW (ref 34.5–45)
HGB BLD-MCNC: 7.7 G/DL — LOW (ref 11.5–15.5)
HGB BLD-MCNC: 7.7 G/DL — LOW (ref 11.5–15.5)
IMM GRANULOCYTES NFR BLD AUTO: 0.3 % — SIGNIFICANT CHANGE UP (ref 0–0.9)
LYMPHOCYTES # BLD AUTO: 2.26 K/UL — SIGNIFICANT CHANGE UP (ref 1–3.3)
LYMPHOCYTES # BLD AUTO: 21.7 % — SIGNIFICANT CHANGE UP (ref 13–44)
MAGNESIUM SERPL-MCNC: 1.9 MG/DL — SIGNIFICANT CHANGE UP (ref 1.6–2.6)
MCHC RBC-ENTMCNC: 26.4 PG — LOW (ref 27–34)
MCHC RBC-ENTMCNC: 26.6 PG — LOW (ref 27–34)
MCHC RBC-ENTMCNC: 30.4 GM/DL — LOW (ref 32–36)
MCHC RBC-ENTMCNC: 30.4 GM/DL — LOW (ref 32–36)
MCV RBC AUTO: 86.6 FL — SIGNIFICANT CHANGE UP (ref 80–100)
MCV RBC AUTO: 87.2 FL — SIGNIFICANT CHANGE UP (ref 80–100)
MONOCYTES # BLD AUTO: 0.9 K/UL — SIGNIFICANT CHANGE UP (ref 0–0.9)
MONOCYTES NFR BLD AUTO: 8.6 % — SIGNIFICANT CHANGE UP (ref 2–14)
NEUTROPHILS # BLD AUTO: 7.12 K/UL — SIGNIFICANT CHANGE UP (ref 1.8–7.4)
NEUTROPHILS NFR BLD AUTO: 68.2 % — SIGNIFICANT CHANGE UP (ref 43–77)
NRBC # BLD: 0 /100 WBCS — SIGNIFICANT CHANGE UP (ref 0–0)
NRBC # BLD: 0 /100 WBCS — SIGNIFICANT CHANGE UP (ref 0–0)
PHOSPHATE SERPL-MCNC: 2.6 MG/DL — SIGNIFICANT CHANGE UP (ref 2.5–4.5)
PLATELET # BLD AUTO: 201 K/UL — SIGNIFICANT CHANGE UP (ref 150–400)
PLATELET # BLD AUTO: 247 K/UL — SIGNIFICANT CHANGE UP (ref 150–400)
POTASSIUM SERPL-MCNC: 3.9 MMOL/L — SIGNIFICANT CHANGE UP (ref 3.5–5.3)
POTASSIUM SERPL-SCNC: 3.9 MMOL/L — SIGNIFICANT CHANGE UP (ref 3.5–5.3)
PROT SERPL-MCNC: 6.1 G/DL — SIGNIFICANT CHANGE UP (ref 6–8.3)
RBC # BLD: 2.9 M/UL — LOW (ref 3.8–5.2)
RBC # BLD: 2.92 M/UL — LOW (ref 3.8–5.2)
RBC # FLD: 13.2 % — SIGNIFICANT CHANGE UP (ref 10.3–14.5)
RBC # FLD: 13.4 % — SIGNIFICANT CHANGE UP (ref 10.3–14.5)
SODIUM SERPL-SCNC: 138 MMOL/L — SIGNIFICANT CHANGE UP (ref 135–145)
T3FREE SERPL-MCNC: 2.18 PG/ML — SIGNIFICANT CHANGE UP (ref 2–4.4)
T4 FREE SERPL-MCNC: 1.7 NG/DL — SIGNIFICANT CHANGE UP (ref 0.9–1.8)
TSH SERPL-MCNC: 2.35 UIU/ML — SIGNIFICANT CHANGE UP (ref 0.27–4.2)
WBC # BLD: 10.43 K/UL — SIGNIFICANT CHANGE UP (ref 3.8–10.5)
WBC # BLD: 15.23 K/UL — HIGH (ref 3.8–10.5)
WBC # FLD AUTO: 10.43 K/UL — SIGNIFICANT CHANGE UP (ref 3.8–10.5)
WBC # FLD AUTO: 15.23 K/UL — HIGH (ref 3.8–10.5)

## 2023-03-30 PROCEDURE — 99291 CRITICAL CARE FIRST HOUR: CPT

## 2023-03-30 PROCEDURE — 99233 SBSQ HOSP IP/OBS HIGH 50: CPT

## 2023-03-30 PROCEDURE — 93308 TTE F-UP OR LMTD: CPT | Mod: 26

## 2023-03-30 PROCEDURE — 71045 X-RAY EXAM CHEST 1 VIEW: CPT | Mod: 26

## 2023-03-30 PROCEDURE — 93010 ELECTROCARDIOGRAM REPORT: CPT

## 2023-03-30 RX ORDER — ALBUMIN HUMAN 25 %
250 VIAL (ML) INTRAVENOUS ONCE
Refills: 0 | Status: COMPLETED | OUTPATIENT
Start: 2023-03-30 | End: 2023-03-30

## 2023-03-30 RX ORDER — NOREPINEPHRINE BITARTRATE/D5W 8 MG/250ML
0.05 PLASTIC BAG, INJECTION (ML) INTRAVENOUS
Qty: 8 | Refills: 0 | Status: DISCONTINUED | OUTPATIENT
Start: 2023-03-30 | End: 2023-03-30

## 2023-03-30 RX ORDER — INSULIN GLARGINE 100 [IU]/ML
40 INJECTION, SOLUTION SUBCUTANEOUS AT BEDTIME
Refills: 0 | Status: DISCONTINUED | OUTPATIENT
Start: 2023-03-30 | End: 2023-04-01

## 2023-03-30 RX ORDER — FENTANYL CITRATE 50 UG/ML
25 INJECTION INTRAVENOUS ONCE
Refills: 0 | Status: DISCONTINUED | OUTPATIENT
Start: 2023-03-30 | End: 2023-03-30

## 2023-03-30 RX ORDER — LIDOCAINE 4 G/100G
1 CREAM TOPICAL DAILY
Refills: 0 | Status: DISCONTINUED | OUTPATIENT
Start: 2023-03-30 | End: 2023-04-02

## 2023-03-30 RX ORDER — INSULIN LISPRO 100/ML
9 VIAL (ML) SUBCUTANEOUS
Refills: 0 | Status: DISCONTINUED | OUTPATIENT
Start: 2023-03-30 | End: 2023-03-31

## 2023-03-30 RX ORDER — VASOPRESSIN 20 [USP'U]/ML
0.02 INJECTION INTRAVENOUS
Qty: 40 | Refills: 0 | Status: DISCONTINUED | OUTPATIENT
Start: 2023-03-30 | End: 2023-03-30

## 2023-03-30 RX ORDER — MAGNESIUM SULFATE 500 MG/ML
2 VIAL (ML) INJECTION ONCE
Refills: 0 | Status: COMPLETED | OUTPATIENT
Start: 2023-03-30 | End: 2023-03-30

## 2023-03-30 RX ADMIN — ENOXAPARIN SODIUM 40 MILLIGRAM(S): 100 INJECTION SUBCUTANEOUS at 13:00

## 2023-03-30 RX ADMIN — AMIODARONE HYDROCHLORIDE 400 MILLIGRAM(S): 400 TABLET ORAL at 17:59

## 2023-03-30 RX ADMIN — Medication 500 MILLIGRAM(S): at 17:59

## 2023-03-30 RX ADMIN — HYDROMORPHONE HYDROCHLORIDE 0.5 MILLIGRAM(S): 2 INJECTION INTRAMUSCULAR; INTRAVENOUS; SUBCUTANEOUS at 13:30

## 2023-03-30 RX ADMIN — SENNA PLUS 2 TABLET(S): 8.6 TABLET ORAL at 21:28

## 2023-03-30 RX ADMIN — LIDOCAINE 1 PATCH: 4 CREAM TOPICAL at 20:38

## 2023-03-30 RX ADMIN — GABAPENTIN 100 MILLIGRAM(S): 400 CAPSULE ORAL at 06:16

## 2023-03-30 RX ADMIN — PANTOPRAZOLE SODIUM 40 MILLIGRAM(S): 20 TABLET, DELAYED RELEASE ORAL at 06:17

## 2023-03-30 RX ADMIN — Medication 500 MILLIGRAM(S): at 06:16

## 2023-03-30 RX ADMIN — Medication 9 UNIT(S): at 11:58

## 2023-03-30 RX ADMIN — CLOPIDOGREL BISULFATE 75 MILLIGRAM(S): 75 TABLET, FILM COATED ORAL at 13:00

## 2023-03-30 RX ADMIN — POLYETHYLENE GLYCOL 3350 17 GRAM(S): 17 POWDER, FOR SOLUTION ORAL at 13:30

## 2023-03-30 RX ADMIN — GABAPENTIN 100 MILLIGRAM(S): 400 CAPSULE ORAL at 21:28

## 2023-03-30 RX ADMIN — Medication 125 MILLILITER(S): at 10:59

## 2023-03-30 RX ADMIN — OXYCODONE HYDROCHLORIDE 5 MILLIGRAM(S): 5 TABLET ORAL at 22:05

## 2023-03-30 RX ADMIN — Medication 8 UNIT(S): at 08:06

## 2023-03-30 RX ADMIN — Medication 650 MILLIGRAM(S): at 13:00

## 2023-03-30 RX ADMIN — Medication 650 MILLIGRAM(S): at 18:00

## 2023-03-30 RX ADMIN — LIDOCAINE 1 PATCH: 4 CREAM TOPICAL at 13:33

## 2023-03-30 RX ADMIN — AMIODARONE HYDROCHLORIDE 400 MILLIGRAM(S): 400 TABLET ORAL at 06:16

## 2023-03-30 RX ADMIN — Medication 1: at 20:02

## 2023-03-30 RX ADMIN — Medication 2: at 11:57

## 2023-03-30 RX ADMIN — Medication 7.31 MICROGRAM(S)/KG/MIN: at 20:04

## 2023-03-30 RX ADMIN — CHLORHEXIDINE GLUCONATE 1 APPLICATION(S): 213 SOLUTION TOPICAL at 13:34

## 2023-03-30 RX ADMIN — ATORVASTATIN CALCIUM 80 MILLIGRAM(S): 80 TABLET, FILM COATED ORAL at 21:28

## 2023-03-30 RX ADMIN — VASOPRESSIN 3 UNIT(S)/MIN: 20 INJECTION INTRAVENOUS at 20:06

## 2023-03-30 RX ADMIN — GABAPENTIN 100 MILLIGRAM(S): 400 CAPSULE ORAL at 13:29

## 2023-03-30 RX ADMIN — FENTANYL CITRATE 25 MICROGRAM(S): 50 INJECTION INTRAVENOUS at 22:16

## 2023-03-30 RX ADMIN — Medication 9 UNIT(S): at 20:38

## 2023-03-30 RX ADMIN — OXYCODONE HYDROCHLORIDE 5 MILLIGRAM(S): 5 TABLET ORAL at 21:35

## 2023-03-30 RX ADMIN — INSULIN GLARGINE 40 UNIT(S): 100 INJECTION, SOLUTION SUBCUTANEOUS at 21:29

## 2023-03-30 RX ADMIN — Medication 650 MILLIGRAM(S): at 18:10

## 2023-03-30 RX ADMIN — Medication 650 MILLIGRAM(S): at 06:17

## 2023-03-30 RX ADMIN — Medication 81 MILLIGRAM(S): at 13:00

## 2023-03-30 RX ADMIN — Medication 125 MILLILITER(S): at 10:35

## 2023-03-30 RX ADMIN — HYDROMORPHONE HYDROCHLORIDE 0.5 MILLIGRAM(S): 2 INJECTION INTRAMUSCULAR; INTRAVENOUS; SUBCUTANEOUS at 13:00

## 2023-03-30 RX ADMIN — Medication 25 GRAM(S): at 02:41

## 2023-03-30 RX ADMIN — FENTANYL CITRATE 25 MICROGRAM(S): 50 INJECTION INTRAVENOUS at 22:45

## 2023-03-30 RX ADMIN — SODIUM CHLORIDE 10 MILLILITER(S): 9 INJECTION INTRAMUSCULAR; INTRAVENOUS; SUBCUTANEOUS at 20:05

## 2023-03-30 RX ADMIN — HYDROMORPHONE HYDROCHLORIDE 30 MILLILITER(S): 2 INJECTION INTRAMUSCULAR; INTRAVENOUS; SUBCUTANEOUS at 07:15

## 2023-03-30 RX ADMIN — Medication 2: at 08:06

## 2023-03-30 RX ADMIN — Medication 650 MILLIGRAM(S): at 14:34

## 2023-03-30 NOTE — DIETITIAN INITIAL EVALUATION ADULT - PERTINENT MEDS FT
MEDICATIONS  (STANDING):  acetaminophen     Tablet .. 650 milliGRAM(s) Oral every 6 hours  aMIOdarone    Tablet 400 milliGRAM(s) Oral two times a day  ascorbic acid 500 milliGRAM(s) Oral two times a day  aspirin enteric coated 81 milliGRAM(s) Oral daily  atorvastatin 80 milliGRAM(s) Oral at bedtime  bisacodyl Suppository 10 milliGRAM(s) Rectal once  chlorhexidine 2% Cloths 1 Application(s) Topical daily  clopidogrel Tablet 75 milliGRAM(s) Oral daily  enoxaparin Injectable 40 milliGRAM(s) SubCutaneous every 24 hours  gabapentin 100 milliGRAM(s) Oral every 8 hours  insulin glargine Injectable (LANTUS) 40 Unit(s) SubCutaneous at bedtime  insulin lispro (ADMELOG) corrective regimen sliding scale   SubCutaneous three times a day before meals  insulin lispro (ADMELOG) corrective regimen sliding scale   SubCutaneous at bedtime  insulin lispro Injectable (ADMELOG) 9 Unit(s) SubCutaneous three times a day before meals  lidocaine   4% Patch 1 Patch Transdermal daily  norepinephrine Infusion 0.05 MICROgram(s)/kG/Min (7.31 mL/Hr) IV Continuous <Continuous>  ondansetron Injectable 4 milliGRAM(s) IV Push once  pantoprazole    Tablet 40 milliGRAM(s) Oral before breakfast  polyethylene glycol 3350 17 Gram(s) Oral daily  senna 2 Tablet(s) Oral at bedtime  sodium chloride 0.9%. 1000 milliLiter(s) (10 mL/Hr) IV Continuous <Continuous>  vasopressin Infusion 0.02 Unit(s)/Min (3 mL/Hr) IV Continuous <Continuous>    MEDICATIONS  (PRN):  oxyCODONE    IR 5 milliGRAM(s) Oral every 4 hours PRN Moderate Pain (4 - 6)  oxyCODONE    IR 10 milliGRAM(s) Oral every 4 hours PRN Severe Pain (7 - 10)

## 2023-03-30 NOTE — PROGRESS NOTE ADULT - ASSESSMENT
47 f with    CAD/ Chest pain/ Abnormal CT coronaries  / s/p Robotic CABG  - telemetry  - ASA  - BB  - Chest tube   - Cardiology follow    Diabetes Mellitus  - BS control  - ADA diet     DVT prophylaxis  - low risk     CTU care    Jan Mitchell MD phone [phone]

## 2023-03-30 NOTE — DIETITIAN INITIAL EVALUATION ADULT - ORAL INTAKE PTA/DIET HISTORY
Unknown how pt was eating PTA nor if pt was following therapeutic diet. Confirms no known food allergies. Unknown if pt took micronutrient or oral nutrient supplement use. Unknown if pt with Hx of chewing or swallowing issues.

## 2023-03-30 NOTE — DIETITIAN INITIAL EVALUATION ADULT - REASON INDICATOR FOR ASSESSMENT
Pt seen for length of stay  Source: Medical record, RN, and limited information obtained from pt as very lethargic

## 2023-03-30 NOTE — PROGRESS NOTE ADULT - ASSESSMENT
Assessment  DMT2: 47y Female with DM T2 with hyperglycemia, A1C 8.3% , was on oral meds at home, s/p surgery, transitioned off insulin drip now on basal bolus insulin with coverage, glucose still running high, insulin adjusted, eating partial meals.   CAD: on medications, stable, monitored. s/p mid CABG, [facility] global care.   ?Hypothyroidism: Not on supplemental synthroid. TSH mildly elevated 4.37, now normal TSH and normal FT4 . Euthyroid.   HTN: on antihypertensive medications, monitored, asymptomatic.        Discussed plan and management wit Dr Jimi Krause MD  Cell: [phone]  Office: [phone]               Assessment  DMT2: 47y Female with DM T2 with hyperglycemia, A1C 8.3% , was on oral meds at home, s/p surgery, transitioned off insulin drip now on basal bolus insulin with coverage, glucose still running high,  insulin adjusted, eating partial meals.   CAD: on medications, stable, monitored. s/p mid CABG, [facility] global care.   ?Hypothyroidism: Not on supplemental synthroid. TSH mildly elevated 4.37, now normal TSH and normal FT4 . Euthyroid.   HTN: on antihypertensive medications, monitored, asymptomatic.        Discussed plan and management wit Dr Jimi Krause MD  Cell: [phone]  Office: [phone]

## 2023-03-30 NOTE — DIETITIAN INITIAL EVALUATION ADULT - NSFNSADHERENCEPTAFT_GEN_A_CORE
Noted with Hx of T2DM; took metformin per H&P. Recent A1C: 8.3 % (03-18-23 @ 06:23) indicates suboptimal glycemic control.

## 2023-03-30 NOTE — DIETITIAN INITIAL EVALUATION ADULT - OTHER INFO
Wt Hx:   Dosing wt 78 kG/171.9 lbs. Daily wt in lbs: 165.3 (3/20), 177.2 (3/29), 193.3 (3/30)  Wt gain noted during admission and likely secondary to intraoperative fluid shifts vs bed scale discrepancy.   UBW unknown.  Ht: 66 inches    IBW: 130 lbs   IBW%: 132%  Wt Hx per HIE (lbs): 171 (3/17/23)    Nutrition-Related Concerns:   - CAD s/p CABG 03/28/23 (Minimally invasive direct coronary artery bypass)   - Stress hyperglycemia. Ordered for Lantus and Admelog   - Ordered for Vitamin C  - On sodium chloride 0.9% at 10 mL/hr

## 2023-03-30 NOTE — DIETITIAN INITIAL EVALUATION ADULT - ADD RECOMMEND
Provide nutrition education as able. Continue to trend labs, weight, skin integrity, and intake. As medically feasible, continue to provide micronutrients.

## 2023-03-30 NOTE — PROGRESS NOTE ADULT - SUBJECTIVE AND OBJECTIVE BOX
Patient seen and examined at the bedside.    Remained critically ill on continuous ICU monitoring.      Brief Summary:  46 y/o F with CAD s/p mid-CAB 03/28/23       24 Hour events:      OBJECTIVE:  Vital Signs Last 24 Hrs  T(C): 36.5 (30 Mar 2023 00:00), Max: 37.1 (29 Mar 2023 12:00)  T(F): 97.7 (30 Mar 2023 00:00), Max: 98.8 (29 Mar 2023 12:00)  HR: 79 (30 Mar 2023 06:30) (72 - 103)  BP: 125/60 (29 Mar 2023 17:00) (118/64 - 130/67)  BP(mean): 86 (29 Mar 2023 17:00) (85 - 93)  RR: 20 (30 Mar 2023 06:30) (9 - 27)  SpO2: 100% (30 Mar 2023 06:30) (94% - 100%)    Parameters below as of 30 Mar 2023 04:00  Patient On (Oxygen Delivery Method): nasal cannula  O2 Flow (L/min): 2              Physical Exam:   General: Mildly obese female, some splinting at rest  Neurology: Awake, alert, no focal deficit  Eyes: Bilateral pupils reactive   ENT/Neck: Neck supple, No JVD   Respiratory: Clear bilaterally   CV: Sinus rhythm  Left chest tube with serosanguinous output.  Abdominal: Soft, Nontender  Extremities: No pedal edema noted, + peripheral pulses   Skin: Warm       -------------------------------------------------------------------------------------------------------------------------------    Labs:                        7.7    10.43 )-----------( 201      ( 30 Mar 2023 00:10 )             25.3     03-30    138  |  104  |  7   ----------------------------<  138<H>  3.9   |  26  |  0.51    Ca    8.7      30 Mar 2023 00:11  Phos  2.6     03-30  Mg     1.9     03-30    TPro  6.1  /  Alb  3.7  /  TBili  0.4  /  DBili  x   /  AST  19  /  ALT  14  /  AlkPhos  48  03-30    LIVER FUNCTIONS - ( 30 Mar 2023 00:11 )  Alb: 3.7 g/dL / Pro: 6.1 g/dL / ALK PHOS: 48 U/L / ALT: 14 U/L / AST: 19 U/L / GGT: x           PT/INR - ( 29 Mar 2023 00:35 )   PT: 15.5 sec;   INR: 1.34 ratio         PTT - ( 29 Mar 2023 00:35 )  PTT:28.6 sec  ABG - ( 29 Mar 2023 23:52 )  pH, Arterial: 7.38  pH, Blood: x     /  pCO2: 45    /  pO2: 170   / HCO3: 27    / Base Excess: 1.2   /  SaO2: 99.0        ------------------------------------------------------------------------------------------------------------------------------  Assessment:  46yo F with CAD s/p CABG 03/28/23     At high risk for hemodynamic instability   Hypovolemia   Postop acute respiratory insufficiency  Acute blood loss anemia  Stress hyperglycemia.      Plan:   ***Neuro***  Postoperative acute pain control with Tylenol, Gabapentin, PRN Oxycodone, and Dilaudid for pain management.     ***Cardiovascular***  At high risk for hemodynamic instability and cardiac arrhythmias.  IVF for perioperative hypovolemia.  Pressors weaned to off.  Will start beta blocker when BP allows.   ASA /Plavix / statin daily   Amiodarone for A. Fib prophylaxis   Monitor chest tube output.    ***Pulmonary***  Deep breathing and coughing exercises.  Wean oxygen as able.    ***GI***  Tolerating Diet   Protonix for stress ulcer prophylaxis   Bowel Regimen   Zofran PRN for nausea/vomiting     ***Renal***  Trend creatinine.   Replete electrolytes as indicated.    ***ID***  No antibiotic coverage.     ***Endocrine***  Stress Hyperglycemia  Insulin infusion - plan to transition to Lantus and premeal overnight yesterday.  Appreciate Endocrine consult.    ***Hematology***  Acute blood loss anemia - no current transfusion indication   Trend CBC and monitor for bleeding.  Lovenox for DVT prophylaxis         Patient requires continuous monitoring with bedside rhythm monitoring, pulse oximetry monitoring, and continuous central venous and arterial pressure monitoring; and intermittent blood gas analysis. Care plan discussed with the ICU care team.   Patient remained critical, at risk for life threatening decompensation.    I have spent 30 minutes providing critical care management to this patient.    By signing my name below, I, Leonardaryan Segun, attest that this documentation has been prepared under the direction and in the presence of Roma Kaplan MD  Electronically signed: Todd Cast, 03-30-23 @ 07:46    I, Roma Kaplan MD, personally performed the services described in this documentation. all medical record entries made by the scribe were at my direction and in my presence. I have reviewed the chart and agree that the record reflects my personal performance and is accurate and complete  Electronically signed: Roma Kaplan MD Patient seen and examined at the bedside.    Remained critically ill on continuous ICU monitoring.      Brief Summary:  48 y/o F with CAD s/p mid-CAB 03/28/23       24 Hour events:  - Orthostatic this morning due to which Levo was administered   - Follow up on CVC  - Administer 1 unit of blood  - Discontinue beta blockers     OBJECTIVE:  Vital Signs Last 24 Hrs  T(C): 36.5 (30 Mar 2023 00:00), Max: 37.1 (29 Mar 2023 12:00)  T(F): 97.7 (30 Mar 2023 00:00), Max: 98.8 (29 Mar 2023 12:00)  HR: 79 (30 Mar 2023 06:30) (72 - 103)  BP: 125/60 (29 Mar 2023 17:00) (118/64 - 130/67)  BP(mean): 86 (29 Mar 2023 17:00) (85 - 93)  RR: 20 (30 Mar 2023 06:30) (9 - 27)  SpO2: 100% (30 Mar 2023 06:30) (94% - 100%)    Parameters below as of 30 Mar 2023 04:00  Patient On (Oxygen Delivery Method): nasal cannula  O2 Flow (L/min): 2              Physical Exam:   General: Mildly obese female, some splinting at rest  Neurology: Awake, alert, no focal deficit  Eyes: Bilateral pupils reactive   ENT/Neck: Neck supple, No JVD   Respiratory: Clear bilaterally   CV: Sinus rhythm  Left chest tube with serosanguinous output.  Abdominal: Soft, Nontender  Extremities: No pedal edema noted, + peripheral pulses   Skin: Warm       -------------------------------------------------------------------------------------------------------------------------------    Labs:                        7.7    10.43 )-----------( 201      ( 30 Mar 2023 00:10 )             25.3     03-30    138  |  104  |  7   ----------------------------<  138<H>  3.9   |  26  |  0.51    Ca    8.7      30 Mar 2023 00:11  Phos  2.6     03-30  Mg     1.9     03-30    TPro  6.1  /  Alb  3.7  /  TBili  0.4  /  DBili  x   /  AST  19  /  ALT  14  /  AlkPhos  48  03-30    LIVER FUNCTIONS - ( 30 Mar 2023 00:11 )  Alb: 3.7 g/dL / Pro: 6.1 g/dL / ALK PHOS: 48 U/L / ALT: 14 U/L / AST: 19 U/L / GGT: x           PT/INR - ( 29 Mar 2023 00:35 )   PT: 15.5 sec;   INR: 1.34 ratio         PTT - ( 29 Mar 2023 00:35 )  PTT:28.6 sec  ABG - ( 29 Mar 2023 23:52 )  pH, Arterial: 7.38  pH, Blood: x     /  pCO2: 45    /  pO2: 170   / HCO3: 27    / Base Excess: 1.2   /  SaO2: 99.0        ------------------------------------------------------------------------------------------------------------------------------  Assessment:  48yo F with CAD s/p CABG 03/28/23     At high risk for hemodynamic instability   Hypovolemia   Postop acute respiratory insufficiency  Acute blood loss anemia  Stress hyperglycemia.      Plan:   ***Neuro***  Postoperative acute pain control with Tylenol, Gabapentin, PRN Oxycodone, and Dilaudid for pain management.     ***Cardiovascular***  At high risk for hemodynamic instability and cardiac arrhythmias.  IVF for perioperative hypovolemia.  Pressors weaned to off.  Will start beta blocker when BP allows.   ASA /Plavix / statin daily   Amiodarone for A. Fib prophylaxis   Monitor chest tube output.    ***Pulmonary***  Deep breathing and coughing exercises.  Wean oxygen as able.    ***GI***  Tolerating Diet   Protonix for stress ulcer prophylaxis   Bowel Regimen   Zofran PRN for nausea/vomiting     ***Renal***  Trend creatinine.   Replete electrolytes as indicated.    ***ID***  No antibiotic coverage.     ***Endocrine***  Stress Hyperglycemia  Insulin infusion - plan to transition to Lantus and premeal overnight yesterday.  Appreciate Endocrine consult.    ***Hematology***  Acute blood loss anemia - no current transfusion indication   Trend CBC and monitor for bleeding.  Lovenox for DVT prophylaxis         Patient requires continuous monitoring with bedside rhythm monitoring, pulse oximetry monitoring, and continuous central venous and arterial pressure monitoring; and intermittent blood gas analysis. Care plan discussed with the ICU care team.   Patient remained critical, at risk for life threatening decompensation.    I have spent 30 minutes providing critical care management to this patient.    By signing my name below, I, Todd Cast, attest that this documentation has been prepared under the direction and in the presence of Roma Kaplan MD  Electronically signed: Todd Cast, 03-30-23 @ 07:46    I, Roma Kaplan MD, personally performed the services described in this documentation. all medical record entries made by the scribe were at my direction and in my presence. I have reviewed the chart and agree that the record reflects my personal performance and is accurate and complete  Electronically signed: Roma Kaplan MD Patient seen and examined at the bedside.    Remained critically ill on continuous ICU monitoring.      Brief Summary:  46 y/o F with CAD s/p mid-CAB 03/28/23       24 Hour events:  - Two episodes of significant orthostatic hypotension this morning requiring Norepinephrine.  - 1 unit prbcs transfused for Hgb < 8.  - TTE pending.      Objective:  Vital Signs Last 24 Hrs  T(C): 36.5 (30 Mar 2023 00:00), Max: 37.1 (29 Mar 2023 12:00)  T(F): 97.7 (30 Mar 2023 00:00), Max: 98.8 (29 Mar 2023 12:00)  HR: 79 (30 Mar 2023 06:30) (72 - 103)  BP: 125/60 (29 Mar 2023 17:00) (118/64 - 130/67)  BP(mean): 86 (29 Mar 2023 17:00) (85 - 93)  RR: 20 (30 Mar 2023 06:30) (9 - 27)  SpO2: 100% (30 Mar 2023 06:30) (94% - 100%)    Parameters below as of 30 Mar 2023 04:00  Patient On (Oxygen Delivery Method): nasal cannula  O2 Flow (L/min): 2              Physical Exam:   General: Sitting in chair, some splinting at rest  Neurology: Awake, alert, no focal deficit  Eyes: Bilateral pupils reactive   ENT/Neck: Neck supple, No JVD   Respiratory: Clear bilaterally   CV: Sinus rhythm  Left chest tube with serosanguinous output.  Abdominal: Soft, Nontender  Extremities: No pedal edema noted, + peripheral pulses   Skin: Warm       -------------------------------------------------------------------------------------------------------------------------------    Labs:                        7.7    10.43 )-----------( 201      ( 30 Mar 2023 00:10 )             25.3     03-30    138  |  104  |  7   ----------------------------<  138<H>  3.9   |  26  |  0.51    Ca    8.7      30 Mar 2023 00:11  Phos  2.6     03-30  Mg     1.9     03-30    TPro  6.1  /  Alb  3.7  /  TBili  0.4  /  DBili  x   /  AST  19  /  ALT  14  /  AlkPhos  48  03-30    LIVER FUNCTIONS - ( 30 Mar 2023 00:11 )  Alb: 3.7 g/dL / Pro: 6.1 g/dL / ALK PHOS: 48 U/L / ALT: 14 U/L / AST: 19 U/L / GGT: x           PT/INR - ( 29 Mar 2023 00:35 )   PT: 15.5 sec;   INR: 1.34 ratio         PTT - ( 29 Mar 2023 00:35 )  PTT:28.6 sec  ABG - ( 29 Mar 2023 23:52 )  pH, Arterial: 7.38  pH, Blood: x     /  pCO2: 45    /  pO2: 170   / HCO3: 27    / Base Excess: 1.2   /  SaO2: 99.0        ------------------------------------------------------------------------------------------------------------------------------  Assessment:  46yo F with CAD s/p CABG 03/28/23     Hemodynamic instability   Hypovolemia   Postop acute respiratory insufficiency  Acute blood loss anemia  Stress hyperglycemia.      Plan:   ***Neuro***  Postoperative acute pain control with Tylenol, Gabapentin, PRN Oxycodone, and Dilaudid for pain management.     ***Cardiovascular***  Hemodynamic instability  At high risk for cardiac arrhythmias.  IVF/prbcs for perioperative hypovolemia.  Wean pressor as able.  Follow up TTE results.  Beta blocker on hold.  ASA /Plavix / statin daily   Amiodarone for A. Fib prophylaxis   Monitor chest tube output.    ***Pulmonary***  Deep breathing and coughing exercises.  Wean oxygen as able.    ***GI***  Tolerating Diet   Protonix for stress ulcer prophylaxis   Bowel Regimen     ***Renal***  Trend creatinine.   Replete electrolytes as indicated.    ***ID***  No antibiotic coverage.     ***Endocrine***  Stress Hyperglycemia  Transitioned to Lantus, premeal, and sliding scale insulin.  Appreciate Endocrine consult.    ***Hematology***  Acute blood loss anemia - transfused 1 unit prbcs  Trend CBC and monitor for bleeding.  Lovenox for DVT prophylaxis         Patient requires continuous monitoring with bedside rhythm monitoring, pulse oximetry monitoring, and continuous central venous and arterial pressure monitoring; and intermittent blood gas analysis. Care plan discussed with the ICU care team.   Patient remains critical, at risk for life threatening decompensation.    I have spent 35 minutes providing critical care management to this patient.    By signing my name below, I, Todd Cast, attest that this documentation has been prepared under the direction and in the presence of Roam Kaplan MD  Electronically signed: Todd Cast, 03-30-23 @ 07:46    I, Roma Kaplan MD, personally performed the services described in this documentation. all medical record entries made by the scribe were at my direction and in my presence. I have reviewed the chart and agree that the record reflects my personal performance and is accurate and complete  Electronically signed: Roma Kaplan MD

## 2023-03-30 NOTE — PROGRESS NOTE ADULT - SUBJECTIVE AND OBJECTIVE BOX
SUBJ:  CABG 3/28  ambulated today, sudden hypotension with presyncope  put back in bed and back on norepinephrine       MEDICATIONS  (STANDING):  acetaminophen     Tablet .. 650 milliGRAM(s) Oral every 6 hours  aMIOdarone    Tablet 400 milliGRAM(s) Oral two times a day  ascorbic acid 500 milliGRAM(s) Oral two times a day  aspirin enteric coated 81 milliGRAM(s) Oral daily  atorvastatin 80 milliGRAM(s) Oral at bedtime  bisacodyl Suppository 10 milliGRAM(s) Rectal once  chlorhexidine 2% Cloths 1 Application(s) Topical daily  clopidogrel Tablet 75 milliGRAM(s) Oral daily  enoxaparin Injectable 40 milliGRAM(s) SubCutaneous every 24 hours  gabapentin 100 milliGRAM(s) Oral every 8 hours  insulin glargine Injectable (LANTUS) 40 Unit(s) SubCutaneous at bedtime  insulin lispro (ADMELOG) corrective regimen sliding scale   SubCutaneous three times a day before meals  insulin lispro (ADMELOG) corrective regimen sliding scale   SubCutaneous at bedtime  insulin lispro Injectable (ADMELOG) 9 Unit(s) SubCutaneous three times a day before meals  lidocaine   4% Patch 1 Patch Transdermal daily  norepinephrine Infusion 0.05 MICROgram(s)/kG/Min (7.31 mL/Hr) IV Continuous <Continuous>  ondansetron Injectable 4 milliGRAM(s) IV Push once  pantoprazole    Tablet 40 milliGRAM(s) Oral before breakfast  polyethylene glycol 3350 17 Gram(s) Oral daily  senna 2 Tablet(s) Oral at bedtime  sodium chloride 0.9%. 1000 milliLiter(s) (10 mL/Hr) IV Continuous <Continuous>  vasopressin Infusion 0.02 Unit(s)/Min (3 mL/Hr) IV Continuous <Continuous>    MEDICATIONS  (PRN):  oxyCODONE    IR 5 milliGRAM(s) Oral every 4 hours PRN Moderate Pain (4 - 6)  oxyCODONE    IR 10 milliGRAM(s) Oral every 4 hours PRN Severe Pain (7 - 10)      Vital Signs Last 24 Hrs  T(C): 37.1 (29 Mar 2023 12:00), Max: 37.1 (29 Mar 2023 12:00)  T(F): 98.8 (29 Mar 2023 12:00), Max: 98.8 (29 Mar 2023 12:00)  HR: 82 (29 Mar 2023 14:00) (58 - 103)  BP: 118/64 (29 Mar 2023 10:00) (88/50 - 130/67)  BP(mean): 85 (29 Mar 2023 10:00) (63 - 93)  RR: 21 (29 Mar 2023 14:00) (8 - 27)  SpO2: 100% (29 Mar 2023 14:00) (95% - 100%)    Parameters below as of 29 Mar 2023 12:00  Patient On (Oxygen Delivery Method): nasal cannula  O2 Flow (L/min): 4      REVIEW OF SYSTEMS:  CONSTITUTIONAL: No fever, weight loss, or fatigue  EYES: No eye pain, visual disturbances, or discharge  ENMT:  No difficulty hearing, tinnitus, vertigo; No sinus or throat pain  NECK: No pain or stiffness  RESPIRATORY: No cough, wheezing, chills or hemoptysis; No shortness of breath  CARDIOVASCULAR: No chest pain, palpitations, dizziness, or leg swelling  GASTROINTESTINAL: No abdominal or epigastric pain. No nausea, vomiting, or hematemesis; No diarrhea or constipation. No melena or hematochezia.  GENITOURINARY: No dysuria, frequency, hematuria, or incontinence  NEUROLOGICAL: No headaches, memory loss, loss of strength, numbness, or tremors  SKIN: No itching, burning, rashes, or lesions   LYMPH NODES: No enlarged glands  ENDOCRINE: No heat or cold intolerance; No hair loss  MUSCULOSKELETAL: No joint pain or swelling; No muscle, back, or extremity pain  PSYCHIATRIC: No depression, anxiety, mood swings, or difficulty sleeping  HEME/LYMPH: No easy bruising, or bleeding gums  ALLERY AND IMMUNOLOGIC: No hives or eczema          PHYSICAL EXAM:  · CONSTITUTIONAL: Well-developed, well nourished      · EYES: EOMI; PERRL; no drainage or redness  · NECK: + lines  ·RESPIRATORY:   airway patent; breath sounds equal; good air movement; respirations non-labored; clear to auscultation bilaterally; no chest wall tenderness; no intercostal retractions; no rales,rhonchi or wheeze  + chest tune  · CARDIOVASCULAR: regular rate and rhythm  no rub  no murmur  normal PMI  chest wall incisions clean and dry, + tender  . GASTROINTESTINAL:  no distention; no masses palpable; bowel sounds normal  · EXTREMITIES: No cyanosis, clubbing or edema  · VASCULAR:  Equal and normal pulses (radial, femoral)  ·NEUROLOGICAL:  Alert and oriented x 3; sensation intact; deep reflexes intact; cranial nerves intact; no spontaneous movement; superficial reflexes intact; normal strength  · SKIN: No lesions; no rash  . LYMPH NODES: No lymphadedenopathy  · MUSCULOSKELETAL:  No calf tenderness  no joint swelling	      LABS:                        7.7    15.23 )-----------( 247      ( 30 Mar 2023 10:40 )             25.3   03-30    138  |  104  |  7   ----------------------------<  138<H>  3.9   |  26  |  0.51    Ca    8.7      30 Mar 2023 00:11  Phos  2.6     03-30  Mg     1.9     03-30    TPro  6.1  /  Alb  3.7  /  TBili  0.4  /  DBili  x   /  AST  19  /  ALT  14  /  AlkPhos  48  03-30    RADIOLOGY & ADDITIONAL STUDIES:    IMPRESSION AND PLAN:      RAGHAV LAINEZ is a 46y/o Female with PMHx of DM2 on metformin who presentd with CP found to have severe obstruction of the pLAD. Pt now s/p LHC demonstrating 100% stenosis in ostial LAD    successful CABG  3/28  hypotensive event, agree with plan for ECHO and transfusion  will follow    CAD  for CABG  early onset CAD  LDL not at level consistent with familial hyperlipidemia    continue   aspirin enteric coated 81 milliGRAM(s) Oral daily  atorvastatin 80 milliGRAM(s) Oral at bedtime  clopidogrel Tablet 75 milliGRAM(s) Oral daily      aMIOdarone    Tablet 400 milliGRAM(s) Oral two times a day      She will need an outpt cardiologist in [place]    Gustavo Espitia MD, PhD  Cardiology Attending  [facility]    For day time coverage Mon-Fri see Non-Service Consult Attending on [email or web address], password: [email or web address]; daytime weekends covered by general cardiology consult service attending.)

## 2023-03-30 NOTE — DIETITIAN INITIAL EVALUATION ADULT - PERTINENT LABORATORY DATA
03-30    138  |  104  |  7   ----------------------------<  138<H>  3.9   |  26  |  0.51    Ca    8.7      30 Mar 2023 00:11  Phos  2.6     03-30  Mg     1.9     03-30    TPro  6.1  /  Alb  3.7  /  TBili  0.4  /  DBili  x   /  AST  19  /  ALT  14  /  AlkPhos  48  03-30  POCT Blood Glucose.: 220 mg/dL (03-30-23 @ 11:41)  A1C with Estimated Average Glucose Result: 8.3 % (03-18-23 @ 06:23)

## 2023-03-30 NOTE — PROGRESS NOTE ADULT - SUBJECTIVE AND OBJECTIVE BOX
Chief complaint  Patient is a 47y old  Female who presents with a chief complaint of cardiac surgery (29 Mar 2023 16:41)         Labs and Fingersticks  CAPILLARY BLOOD GLUCOSE      POCT Blood Glucose.: 220 mg/dL (30 Mar 2023 11:41)  POCT Blood Glucose.: 203 mg/dL (30 Mar 2023 08:01)  POCT Blood Glucose.: 135 mg/dL (29 Mar 2023 23:51)  POCT Blood Glucose.: 132 mg/dL (29 Mar 2023 22:56)  POCT Blood Glucose.: 151 mg/dL (29 Mar 2023 22:10)  POCT Blood Glucose.: 176 mg/dL (29 Mar 2023 21:08)  POCT Blood Glucose.: 176 mg/dL (29 Mar 2023 20:08)  POCT Blood Glucose.: 173 mg/dL (29 Mar 2023 18:52)  POCT Blood Glucose.: 182 mg/dL (29 Mar 2023 18:16)  POCT Blood Glucose.: 145 mg/dL (29 Mar 2023 17:02)  POCT Blood Glucose.: 139 mg/dL (29 Mar 2023 15:46)  POCT Blood Glucose.: 134 mg/dL (29 Mar 2023 15:00)      Anion Gap, Serum: 8 (03-30 @ 00:11)  Anion Gap, Serum: 10 (03-29 @ 00:35)      Calcium, Total Serum: 8.7 (03-30 @ 00:11)  Calcium, Total Serum: 8.8 (03-29 @ 00:35)  Albumin, Serum: 3.7 (03-30 @ 00:11)  Albumin, Serum: 4.0 (03-29 @ 00:35)    Alanine Aminotransferase (ALT/SGPT): 14 (03-30 @ 00:11)  Alanine Aminotransferase (ALT/SGPT): 16 (03-29 @ 00:35)  Alkaline Phosphatase, Serum: 48 (03-30 @ 00:11)  Alkaline Phosphatase, Serum: 53 (03-29 @ 00:35)  Aspartate Aminotransferase (AST/SGOT): 19 (03-30 @ 00:11)  Aspartate Aminotransferase (AST/SGOT): 18 (03-29 @ 00:35)        03-30    138  |  104  |  7   ----------------------------<  138<H>  3.9   |  26  |  0.51    Ca    8.7      30 Mar 2023 00:11  Phos  2.6     03-30  Mg     1.9     03-30    TPro  6.1  /  Alb  3.7  /  TBili  0.4  /  DBili  x   /  AST  19  /  ALT  14  /  AlkPhos  48  03-30                        7.7    15.23 )-----------( 247      ( 30 Mar 2023 10:40 )             25.3     Medications  MEDICATIONS  (STANDING):  acetaminophen     Tablet .. 650 milliGRAM(s) Oral every 6 hours  aMIOdarone    Tablet 400 milliGRAM(s) Oral two times a day  ascorbic acid 500 milliGRAM(s) Oral two times a day  aspirin enteric coated 81 milliGRAM(s) Oral daily  atorvastatin 80 milliGRAM(s) Oral at bedtime  bisacodyl Suppository 10 milliGRAM(s) Rectal once  chlorhexidine 2% Cloths 1 Application(s) Topical daily  clopidogrel Tablet 75 milliGRAM(s) Oral daily  enoxaparin Injectable 40 milliGRAM(s) SubCutaneous every 24 hours  gabapentin 100 milliGRAM(s) Oral every 8 hours  insulin glargine Injectable (LANTUS) 40 Unit(s) SubCutaneous at bedtime  insulin lispro (ADMELOG) corrective regimen sliding scale   SubCutaneous three times a day before meals  insulin lispro (ADMELOG) corrective regimen sliding scale   SubCutaneous at bedtime  insulin lispro Injectable (ADMELOG) 9 Unit(s) SubCutaneous three times a day before meals  lidocaine   4% Patch 1 Patch Transdermal daily  norepinephrine Infusion 0.05 MICROgram(s)/kG/Min (7.31 mL/Hr) IV Continuous <Continuous>  ondansetron Injectable 4 milliGRAM(s) IV Push once  pantoprazole    Tablet 40 milliGRAM(s) Oral before breakfast  polyethylene glycol 3350 17 Gram(s) Oral daily  senna 2 Tablet(s) Oral at bedtime  sodium chloride 0.9%. 1000 milliLiter(s) (10 mL/Hr) IV Continuous <Continuous>  vasopressin Infusion 0.02 Unit(s)/Min (3 mL/Hr) IV Continuous <Continuous>      Physical Exam  General: Patient comfortable in bed   Vital Signs Last 12 Hrs  T(F): 97.7 (03-30-23 @ 12:00), Max: 98 (03-30-23 @ 08:00)  HR: 88 (03-30-23 @ 13:00) (73 - 104)  BP: 124/67 (03-30-23 @ 13:00) (78/49 - 131/74)  BP(mean): 90 (03-30-23 @ 13:00) (60 - 98)  RR: 22 (03-30-23 @ 13:00) (13 - 28)  SpO2: 100% (03-30-23 @ 13:00) (95% - 100%)    CVS: S1S2   Respiratory: No wheezing, no crepitations  GI: Abdomen soft, bowel sounds positive  Musculoskeletal:  moves all extremities         Chief complaint  Patient is a 47y old  Female who presents with a chief complaint of cardiac surgery (29 Mar 2023 16:41)     Labs and Fingersticks  CAPILLARY BLOOD GLUCOSE      POCT Blood Glucose.: 220 mg/dL (30 Mar 2023 11:41)  POCT Blood Glucose.: 203 mg/dL (30 Mar 2023 08:01)  POCT Blood Glucose.: 135 mg/dL (29 Mar 2023 23:51)  POCT Blood Glucose.: 132 mg/dL (29 Mar 2023 22:56)  POCT Blood Glucose.: 151 mg/dL (29 Mar 2023 22:10)  POCT Blood Glucose.: 176 mg/dL (29 Mar 2023 21:08)  POCT Blood Glucose.: 176 mg/dL (29 Mar 2023 20:08)  POCT Blood Glucose.: 173 mg/dL (29 Mar 2023 18:52)  POCT Blood Glucose.: 182 mg/dL (29 Mar 2023 18:16)  POCT Blood Glucose.: 145 mg/dL (29 Mar 2023 17:02)  POCT Blood Glucose.: 139 mg/dL (29 Mar 2023 15:46)  POCT Blood Glucose.: 134 mg/dL (29 Mar 2023 15:00)      Anion Gap, Serum: 8 (03-30 @ 00:11)  Anion Gap, Serum: 10 (03-29 @ 00:35)      Calcium, Total Serum: 8.7 (03-30 @ 00:11)  Calcium, Total Serum: 8.8 (03-29 @ 00:35)  Albumin, Serum: 3.7 (03-30 @ 00:11)  Albumin, Serum: 4.0 (03-29 @ 00:35)    Alanine Aminotransferase (ALT/SGPT): 14 (03-30 @ 00:11)  Alanine Aminotransferase (ALT/SGPT): 16 (03-29 @ 00:35)  Alkaline Phosphatase, Serum: 48 (03-30 @ 00:11)  Alkaline Phosphatase, Serum: 53 (03-29 @ 00:35)  Aspartate Aminotransferase (AST/SGOT): 19 (03-30 @ 00:11)  Aspartate Aminotransferase (AST/SGOT): 18 (03-29 @ 00:35)        03-30    138  |  104  |  7   ----------------------------<  138<H>  3.9   |  26  |  0.51    Ca    8.7      30 Mar 2023 00:11  Phos  2.6     03-30  Mg     1.9     03-30    TPro  6.1  /  Alb  3.7  /  TBili  0.4  /  DBili  x   /  AST  19  /  ALT  14  /  AlkPhos  48  03-30                        7.7    15.23 )-----------( 247      ( 30 Mar 2023 10:40 )             25.3     Medications  MEDICATIONS  (STANDING):  acetaminophen     Tablet .. 650 milliGRAM(s) Oral every 6 hours  aMIOdarone    Tablet 400 milliGRAM(s) Oral two times a day  ascorbic acid 500 milliGRAM(s) Oral two times a day  aspirin enteric coated 81 milliGRAM(s) Oral daily  atorvastatin 80 milliGRAM(s) Oral at bedtime  bisacodyl Suppository 10 milliGRAM(s) Rectal once  chlorhexidine 2% Cloths 1 Application(s) Topical daily  clopidogrel Tablet 75 milliGRAM(s) Oral daily  enoxaparin Injectable 40 milliGRAM(s) SubCutaneous every 24 hours  gabapentin 100 milliGRAM(s) Oral every 8 hours  insulin glargine Injectable (LANTUS) 40 Unit(s) SubCutaneous at bedtime  insulin lispro (ADMELOG) corrective regimen sliding scale   SubCutaneous three times a day before meals  insulin lispro (ADMELOG) corrective regimen sliding scale   SubCutaneous at bedtime  insulin lispro Injectable (ADMELOG) 9 Unit(s) SubCutaneous three times a day before meals  lidocaine   4% Patch 1 Patch Transdermal daily  norepinephrine Infusion 0.05 MICROgram(s)/kG/Min (7.31 mL/Hr) IV Continuous <Continuous>  ondansetron Injectable 4 milliGRAM(s) IV Push once  pantoprazole    Tablet 40 milliGRAM(s) Oral before breakfast  polyethylene glycol 3350 17 Gram(s) Oral daily  senna 2 Tablet(s) Oral at bedtime  sodium chloride 0.9%. 1000 milliLiter(s) (10 mL/Hr) IV Continuous <Continuous>  vasopressin Infusion 0.02 Unit(s)/Min (3 mL/Hr) IV Continuous <Continuous>      Physical Exam  General: Patient comfortable in bed   Vital Signs Last 12 Hrs  T(F): 97.7 (03-30-23 @ 12:00), Max: 98 (03-30-23 @ 08:00)  HR: 88 (03-30-23 @ 13:00) (73 - 104)  BP: 124/67 (03-30-23 @ 13:00) (78/49 - 131/74)  BP(mean): 90 (03-30-23 @ 13:00) (60 - 98)  RR: 22 (03-30-23 @ 13:00) (13 - 28)  SpO2: 100% (03-30-23 @ 13:00) (95% - 100%)    CVS: S1S2   Respiratory: No wheezing, no crepitations  GI: Abdomen soft, bowel sounds positive  Musculoskeletal:  moves all extremities

## 2023-03-30 NOTE — DIETITIAN INITIAL EVALUATION ADULT - [id number]_GEN_A_CORE
03-29-23 @ 07:01  -  03-30-23 @ 07:00  --------------------------------------------------------  OUT:    Chest Tube (mL): 190 mL  Total OUT: 190 mL    Total NET: -190 mL      03-30-23 @ 07:01  -  03-30-23 @ 14:41  --------------------------------------------------------  OUT:    Chest Tube (mL): 10 mL  Total OUT: 10 mL    Total NET: -10 mL

## 2023-03-30 NOTE — PROGRESS NOTE ADULT - SUBJECTIVE AND OBJECTIVE BOX
Patient is a 47y old  Female who presents with a chief complaint of CP (30 Mar 2023 14:58)      SUBJECTIVE / OVERNIGHT EVENTS: Comfortable without new complaints.   Review of Systems  chest pain no  palpitations no  sob no  nausea no  headache no    MEDICATIONS  (STANDING):  acetaminophen     Tablet .. 650 milliGRAM(s) Oral every 6 hours  aMIOdarone    Tablet 400 milliGRAM(s) Oral two times a day  ascorbic acid 500 milliGRAM(s) Oral two times a day  aspirin enteric coated 81 milliGRAM(s) Oral daily  atorvastatin 80 milliGRAM(s) Oral at bedtime  bisacodyl Suppository 10 milliGRAM(s) Rectal once  chlorhexidine 2% Cloths 1 Application(s) Topical daily  clopidogrel Tablet 75 milliGRAM(s) Oral daily  enoxaparin Injectable 40 milliGRAM(s) SubCutaneous every 24 hours  gabapentin 100 milliGRAM(s) Oral every 8 hours  insulin glargine Injectable (LANTUS) 40 Unit(s) SubCutaneous at bedtime  insulin lispro (ADMELOG) corrective regimen sliding scale   SubCutaneous three times a day before meals  insulin lispro (ADMELOG) corrective regimen sliding scale   SubCutaneous at bedtime  insulin lispro Injectable (ADMELOG) 9 Unit(s) SubCutaneous three times a day before meals  lidocaine   4% Patch 1 Patch Transdermal daily  norepinephrine Infusion 0.05 MICROgram(s)/kG/Min (7.31 mL/Hr) IV Continuous <Continuous>  ondansetron Injectable 4 milliGRAM(s) IV Push once  pantoprazole    Tablet 40 milliGRAM(s) Oral before breakfast  polyethylene glycol 3350 17 Gram(s) Oral daily  senna 2 Tablet(s) Oral at bedtime  sodium chloride 0.9%. 1000 milliLiter(s) (10 mL/Hr) IV Continuous <Continuous>  vasopressin Infusion 0.02 Unit(s)/Min (3 mL/Hr) IV Continuous <Continuous>    MEDICATIONS  (PRN):  oxyCODONE    IR 5 milliGRAM(s) Oral every 4 hours PRN Moderate Pain (4 - 6)  oxyCODONE    IR 10 milliGRAM(s) Oral every 4 hours PRN Severe Pain (7 - 10)      Vital Signs Last 24 Hrs  T(C): 37.6 (30 Mar 2023 20:00), Max: 37.6 (30 Mar 2023 20:00)  T(F): 99.7 (30 Mar 2023 20:00), Max: 99.7 (30 Mar 2023 20:00)  HR: 105 (30 Mar 2023 20:00) (73 - 105)  BP: 111/63 (30 Mar 2023 14:00) (78/49 - 131/74)  BP(mean): 81 (30 Mar 2023 14:00) (60 - 98)  RR: 21 (30 Mar 2023 20:00) (9 - 28)  SpO2: 100% (30 Mar 2023 20:00) (95% - 100%)    Parameters below as of 30 Mar 2023 20:00  Patient On (Oxygen Delivery Method): nasal cannula  O2 Flow (L/min): 2      PHYSICAL EXAM:  GENERAL: NAD, well-developed  HEAD:  Atraumatic, Normocephalic  EYES: EOMI, PERRLA, conjunctiva and sclera clear  NECK: Supple, No JVD  CHEST/LUNG: Clear to auscultation bilaterally; No wheeze Wounds with clean dressing. CTX1  HEART: Regular rate and rhythm; No murmurs, rubs, or gallops  ABDOMEN: Soft, Nontender, Nondistended; Bowel sounds present  EXTREMITIES:  2+ Peripheral Pulses, No clubbing, cyanosis, or edema  PSYCH: AAOx3  NEUROLOGY: non-focal  SKIN: No rashes or lesions    LABS:                        7.7    15.23 )-----------( 247      ( 30 Mar 2023 10:40 )             25.3     03-30    138  |  104  |  7   ----------------------------<  138<H>  3.9   |  26  |  0.51    Ca    8.7      30 Mar 2023 00:11  Phos  2.6     03-30  Mg     1.9     03-30    TPro  6.1  /  Alb  3.7  /  TBili  0.4  /  DBili  x   /  AST  19  /  ALT  14  /  AlkPhos  48  03-30    PT/INR - ( 29 Mar 2023 00:35 )   PT: 15.5 sec;   INR: 1.34 ratio         PTT - ( 29 Mar 2023 00:35 )  PTT:28.6 sec            RADIOLOGY & ADDITIONAL TESTS:    Imaging Personally Reviewed:    Consultant(s) Notes Reviewed:      Care Discussed with Consultants/Other Providers:

## 2023-03-30 NOTE — PROGRESS NOTE ADULT - SUBJECTIVE AND OBJECTIVE BOX
Day 2 of Anesthesia Pain Management Service    SUBJECTIVE: Doing ok    Pain Scale Score:	[X] Refer to charted pain scores    THERAPY:    [ ] IV PCA Morphine		        [ ] 5 mg/mL	[ ] 1 mg/mL  [X] IV PCA Hydromorphone	[ ] 5 mg/mL	[X] 1 mg/mL  [ ] IV PCA Fentanyl		        [ ] 50 micrograms/mL    Demand dose: 0.2 mg     Lockout: 6 minutes   Continuous Rate: 0 mg/hr  4 Hour Limit: 4 mg    MEDICATIONS  (STANDING):  acetaminophen     Tablet .. 650 milliGRAM(s) Oral every 6 hours  aMIOdarone    Tablet 400 milliGRAM(s) Oral two times a day  ascorbic acid 500 milliGRAM(s) Oral two times a day  aspirin enteric coated 81 milliGRAM(s) Oral daily  atorvastatin 80 milliGRAM(s) Oral at bedtime  bisacodyl Suppository 10 milliGRAM(s) Rectal once  chlorhexidine 2% Cloths 1 Application(s) Topical daily  clopidogrel Tablet 75 milliGRAM(s) Oral daily  enoxaparin Injectable 40 milliGRAM(s) SubCutaneous every 24 hours  gabapentin 100 milliGRAM(s) Oral every 8 hours  HYDROmorphone PCA (1 mG/mL) 30 milliLiter(s) PCA Continuous PCA Continuous  insulin glargine Injectable (LANTUS) 40 Unit(s) SubCutaneous at bedtime  insulin lispro (ADMELOG) corrective regimen sliding scale   SubCutaneous three times a day before meals  insulin lispro (ADMELOG) corrective regimen sliding scale   SubCutaneous at bedtime  insulin lispro Injectable (ADMELOG) 9 Unit(s) SubCutaneous three times a day before meals  metoprolol tartrate 12.5 milliGRAM(s) Oral every 12 hours  ondansetron Injectable 4 milliGRAM(s) IV Push once  pantoprazole    Tablet 40 milliGRAM(s) Oral before breakfast  polyethylene glycol 3350 17 Gram(s) Oral daily  senna 2 Tablet(s) Oral at bedtime  sodium chloride 0.9%. 1000 milliLiter(s) (10 mL/Hr) IV Continuous <Continuous>    MEDICATIONS  (PRN):  HYDROmorphone  Injectable 0.5 milliGRAM(s) IV Push every 6 hours PRN Breakthrough Pain  naloxone Injectable 0.1 milliGRAM(s) IV Push every 3 minutes PRN For ANY of the following changes in patient status:  A. RR LESS THAN 10 breaths per minute, B. Oxygen saturation LESS THAN 90%, C. Sedation score of 6  ondansetron Injectable 4 milliGRAM(s) IV Push every 6 hours PRN Nausea  oxyCODONE    IR 5 milliGRAM(s) Oral every 4 hours PRN Moderate Pain (4 - 6)  oxyCODONE    IR 10 milliGRAM(s) Oral every 4 hours PRN Severe Pain (7 - 10)      OBJECTIVE:    Sedation Score:	[ X] Alert	 [ ] Drowsy 	[ ] Arousable	[ ] Asleep	[ ] Unresponsive    Side Effects:	[X ] None	[ ] Nausea	[ ] Vomiting	[ ] Pruritus  		[ ] Other:    Vital Signs Last 24 Hrs  T(C): 36.7 (30 Mar 2023 08:00), Max: 37.1 (29 Mar 2023 12:00)  T(F): 98 (30 Mar 2023 08:00), Max: 98.8 (29 Mar 2023 12:00)  HR: 95 (30 Mar 2023 09:00) (72 - 104)  BP: 125/60 (29 Mar 2023 17:00) (118/64 - 125/60)  BP(mean): 86 (29 Mar 2023 17:00) (85 - 86)  RR: 16 (30 Mar 2023 09:00) (9 - 23)  SpO2: 98% (30 Mar 2023 09:00) (94% - 100%)    Parameters below as of 30 Mar 2023 08:00  Patient On (Oxygen Delivery Method): room air        ASSESSMENT/ PLAN    Therapy to  be:               [  ] Continued   [X ] Discontinued   [ X] Changed to PRN Analgesics    Documentation and Verification of current medications:   [X] Done	[ ] Not done, not eligible    Comments: OOB in chair. PAC use decreasing. D\C PCA and transition to prn analgesics

## 2023-03-31 DIAGNOSIS — Z95.1 PRESENCE OF AORTOCORONARY BYPASS GRAFT: ICD-10-CM

## 2023-03-31 LAB
ALBUMIN SERPL ELPH-MCNC: 3.5 G/DL — SIGNIFICANT CHANGE UP (ref 3.3–5)
ALP SERPL-CCNC: 45 U/L — SIGNIFICANT CHANGE UP (ref 40–120)
ALT FLD-CCNC: 14 U/L — SIGNIFICANT CHANGE UP (ref 10–45)
AST SERPL-CCNC: 15 U/L — SIGNIFICANT CHANGE UP (ref 10–40)
BASOPHILS # BLD AUTO: 0.04 K/UL — SIGNIFICANT CHANGE UP (ref 0–0.2)
BASOPHILS NFR BLD AUTO: 0.4 % — SIGNIFICANT CHANGE UP (ref 0–2)
BILIRUB SERPL-MCNC: 0.4 MG/DL — SIGNIFICANT CHANGE UP (ref 0.2–1.2)
BUN SERPL-MCNC: 9 MG/DL — SIGNIFICANT CHANGE UP (ref 7–23)
CALCIUM SERPL-MCNC: 8.7 MG/DL — SIGNIFICANT CHANGE UP (ref 8.4–10.5)
CHLORIDE SERPL-SCNC: 109 MMOL/L — HIGH (ref 96–108)
CO2 SERPL-SCNC: 25 MMOL/L — SIGNIFICANT CHANGE UP (ref 22–31)
CREAT SERPL-MCNC: 0.49 MG/DL — LOW (ref 0.5–1.3)
EGFR: 118 ML/MIN/1.73M2 — SIGNIFICANT CHANGE UP
EOSINOPHIL # BLD AUTO: 0.1 K/UL — SIGNIFICANT CHANGE UP (ref 0–0.5)
EOSINOPHIL NFR BLD AUTO: 0.9 % — SIGNIFICANT CHANGE UP (ref 0–6)
GAS PNL BLDA: SIGNIFICANT CHANGE UP
GLUCOSE BLDC GLUCOMTR-MCNC: 129 MG/DL — HIGH (ref 70–99)
GLUCOSE BLDC GLUCOMTR-MCNC: 216 MG/DL — HIGH (ref 70–99)
GLUCOSE BLDC GLUCOMTR-MCNC: 236 MG/DL — HIGH (ref 70–99)
GLUCOSE BLDC GLUCOMTR-MCNC: 264 MG/DL — HIGH (ref 70–99)
GLUCOSE SERPL-MCNC: 136 MG/DL — HIGH (ref 70–99)
HCT VFR BLD CALC: 24.8 % — LOW (ref 34.5–45)
HGB BLD-MCNC: 7.8 G/DL — LOW (ref 11.5–15.5)
IMM GRANULOCYTES NFR BLD AUTO: 0.6 % — SIGNIFICANT CHANGE UP (ref 0–0.9)
LYMPHOCYTES # BLD AUTO: 2.37 K/UL — SIGNIFICANT CHANGE UP (ref 1–3.3)
LYMPHOCYTES # BLD AUTO: 22.4 % — SIGNIFICANT CHANGE UP (ref 13–44)
MAGNESIUM SERPL-MCNC: 1.7 MG/DL — SIGNIFICANT CHANGE UP (ref 1.6–2.6)
MCHC RBC-ENTMCNC: 27.3 PG — SIGNIFICANT CHANGE UP (ref 27–34)
MCHC RBC-ENTMCNC: 31.5 GM/DL — LOW (ref 32–36)
MCV RBC AUTO: 86.7 FL — SIGNIFICANT CHANGE UP (ref 80–100)
MONOCYTES # BLD AUTO: 0.95 K/UL — HIGH (ref 0–0.9)
MONOCYTES NFR BLD AUTO: 9 % — SIGNIFICANT CHANGE UP (ref 2–14)
NEUTROPHILS # BLD AUTO: 7.04 K/UL — SIGNIFICANT CHANGE UP (ref 1.8–7.4)
NEUTROPHILS NFR BLD AUTO: 66.7 % — SIGNIFICANT CHANGE UP (ref 43–77)
NRBC # BLD: 0 /100 WBCS — SIGNIFICANT CHANGE UP (ref 0–0)
PHOSPHATE SERPL-MCNC: 2.2 MG/DL — LOW (ref 2.5–4.5)
PLATELET # BLD AUTO: 192 K/UL — SIGNIFICANT CHANGE UP (ref 150–400)
POTASSIUM SERPL-MCNC: 3.6 MMOL/L — SIGNIFICANT CHANGE UP (ref 3.5–5.3)
POTASSIUM SERPL-SCNC: 3.6 MMOL/L — SIGNIFICANT CHANGE UP (ref 3.5–5.3)
PROT SERPL-MCNC: 6 G/DL — SIGNIFICANT CHANGE UP (ref 6–8.3)
RBC # BLD: 2.86 M/UL — LOW (ref 3.8–5.2)
RBC # FLD: 13.4 % — SIGNIFICANT CHANGE UP (ref 10.3–14.5)
SODIUM SERPL-SCNC: 135 MMOL/L — SIGNIFICANT CHANGE UP (ref 135–145)
WBC # BLD: 10.56 K/UL — HIGH (ref 3.8–10.5)
WBC # FLD AUTO: 10.56 K/UL — HIGH (ref 3.8–10.5)

## 2023-03-31 PROCEDURE — 71045 X-RAY EXAM CHEST 1 VIEW: CPT | Mod: 26,76

## 2023-03-31 PROCEDURE — 99233 SBSQ HOSP IP/OBS HIGH 50: CPT

## 2023-03-31 RX ORDER — POTASSIUM CHLORIDE 20 MEQ
10 PACKET (EA) ORAL
Refills: 0 | Status: COMPLETED | OUTPATIENT
Start: 2023-03-31 | End: 2023-03-31

## 2023-03-31 RX ORDER — POTASSIUM BICARBONATE 978 MG/1
25 TABLET, EFFERVESCENT ORAL ONCE
Refills: 0 | Status: COMPLETED | OUTPATIENT
Start: 2023-03-31 | End: 2023-03-31

## 2023-03-31 RX ORDER — MAGNESIUM SULFATE 500 MG/ML
2 VIAL (ML) INJECTION ONCE
Refills: 0 | Status: COMPLETED | OUTPATIENT
Start: 2023-03-31 | End: 2023-03-31

## 2023-03-31 RX ORDER — METOPROLOL TARTRATE 50 MG
12.5 TABLET ORAL
Refills: 0 | Status: DISCONTINUED | OUTPATIENT
Start: 2023-03-31 | End: 2023-04-01

## 2023-03-31 RX ORDER — LANOLIN ALCOHOL/MO/W.PET/CERES
3 CREAM (GRAM) TOPICAL AT BEDTIME
Refills: 0 | Status: DISCONTINUED | OUTPATIENT
Start: 2023-03-31 | End: 2023-04-02

## 2023-03-31 RX ORDER — INSULIN LISPRO 100/ML
10 VIAL (ML) SUBCUTANEOUS
Refills: 0 | Status: DISCONTINUED | OUTPATIENT
Start: 2023-03-31 | End: 2023-04-01

## 2023-03-31 RX ADMIN — ENOXAPARIN SODIUM 40 MILLIGRAM(S): 100 INJECTION SUBCUTANEOUS at 11:42

## 2023-03-31 RX ADMIN — GABAPENTIN 100 MILLIGRAM(S): 400 CAPSULE ORAL at 06:26

## 2023-03-31 RX ADMIN — SENNA PLUS 2 TABLET(S): 8.6 TABLET ORAL at 21:25

## 2023-03-31 RX ADMIN — Medication 9 UNIT(S): at 08:17

## 2023-03-31 RX ADMIN — ATORVASTATIN CALCIUM 80 MILLIGRAM(S): 80 TABLET, FILM COATED ORAL at 21:22

## 2023-03-31 RX ADMIN — LIDOCAINE 1 PATCH: 4 CREAM TOPICAL at 01:27

## 2023-03-31 RX ADMIN — Medication 50 MILLIEQUIVALENT(S): at 06:26

## 2023-03-31 RX ADMIN — Medication 500 MILLIGRAM(S): at 06:27

## 2023-03-31 RX ADMIN — AMIODARONE HYDROCHLORIDE 400 MILLIGRAM(S): 400 TABLET ORAL at 08:01

## 2023-03-31 RX ADMIN — OXYCODONE HYDROCHLORIDE 5 MILLIGRAM(S): 5 TABLET ORAL at 03:50

## 2023-03-31 RX ADMIN — Medication 10 UNIT(S): at 17:19

## 2023-03-31 RX ADMIN — Medication 3: at 17:19

## 2023-03-31 RX ADMIN — OXYCODONE HYDROCHLORIDE 10 MILLIGRAM(S): 5 TABLET ORAL at 21:26

## 2023-03-31 RX ADMIN — Medication 650 MILLIGRAM(S): at 12:42

## 2023-03-31 RX ADMIN — Medication 2: at 12:03

## 2023-03-31 RX ADMIN — Medication 650 MILLIGRAM(S): at 06:26

## 2023-03-31 RX ADMIN — GABAPENTIN 100 MILLIGRAM(S): 400 CAPSULE ORAL at 13:01

## 2023-03-31 RX ADMIN — PANTOPRAZOLE SODIUM 40 MILLIGRAM(S): 20 TABLET, DELAYED RELEASE ORAL at 06:26

## 2023-03-31 RX ADMIN — Medication 25 GRAM(S): at 03:46

## 2023-03-31 RX ADMIN — Medication 10 UNIT(S): at 12:01

## 2023-03-31 RX ADMIN — Medication 12.5 MILLIGRAM(S): at 22:00

## 2023-03-31 RX ADMIN — Medication 12.5 MILLIGRAM(S): at 12:42

## 2023-03-31 RX ADMIN — Medication 81 MILLIGRAM(S): at 12:41

## 2023-03-31 RX ADMIN — POLYETHYLENE GLYCOL 3350 17 GRAM(S): 17 POWDER, FOR SOLUTION ORAL at 12:43

## 2023-03-31 RX ADMIN — Medication 500 MILLIGRAM(S): at 17:18

## 2023-03-31 RX ADMIN — OXYCODONE HYDROCHLORIDE 5 MILLIGRAM(S): 5 TABLET ORAL at 09:45

## 2023-03-31 RX ADMIN — POTASSIUM BICARBONATE 25 MILLIEQUIVALENT(S): 978 TABLET, EFFERVESCENT ORAL at 08:01

## 2023-03-31 RX ADMIN — Medication 3 MILLIGRAM(S): at 21:22

## 2023-03-31 RX ADMIN — INSULIN GLARGINE 40 UNIT(S): 100 INJECTION, SOLUTION SUBCUTANEOUS at 21:23

## 2023-03-31 RX ADMIN — OXYCODONE HYDROCHLORIDE 10 MILLIGRAM(S): 5 TABLET ORAL at 22:00

## 2023-03-31 RX ADMIN — OXYCODONE HYDROCHLORIDE 5 MILLIGRAM(S): 5 TABLET ORAL at 05:15

## 2023-03-31 RX ADMIN — GABAPENTIN 100 MILLIGRAM(S): 400 CAPSULE ORAL at 21:22

## 2023-03-31 RX ADMIN — Medication 50 MILLIEQUIVALENT(S): at 04:44

## 2023-03-31 RX ADMIN — CHLORHEXIDINE GLUCONATE 1 APPLICATION(S): 213 SOLUTION TOPICAL at 11:00

## 2023-03-31 RX ADMIN — Medication 63.75 MILLIMOLE(S): at 03:59

## 2023-03-31 RX ADMIN — OXYCODONE HYDROCHLORIDE 5 MILLIGRAM(S): 5 TABLET ORAL at 10:30

## 2023-03-31 RX ADMIN — CLOPIDOGREL BISULFATE 75 MILLIGRAM(S): 75 TABLET, FILM COATED ORAL at 12:43

## 2023-03-31 NOTE — PROGRESS NOTE ADULT - ASSESSMENT
Assessment  DMT2: 47y Female with DM T2 with hyperglycemia, A1C 8.3% , was on oral meds at home, s/p surgery, transitioned off insulin drip now on basal bolus insulin with coverage, glucose still running high, insulin adjusted, now eating meals and transferred to step down.   CAD: on medications, stable, monitored. s/p mid CABG, [facility].   ?Hypothyroidism: Not on supplemental synthroid. TSH mildly elevated 4.37, now normal TSH and normal FT4 . Euthyroid.   HTN: on antihypertensive medications, monitored, asymptomatic.        Discussed plan and management wit Dr Jimi Krause MD  Cell: [phone]  Office: [phone]               Assessment  DMT2: 47y Female with DM T2 with hyperglycemia, A1C 8.3% , was on oral meds at home, s/p surgery, transitioned off insulin drip now on basal bolus  insulin with coverage, glucose still running high, insulin adjusted, now eating meals and transferred to step down.   CAD: on medications, stable, monitored. s/p mid CABG, [facility].   ?Hypothyroidism: Not on supplemental synthroid. TSH mildly elevated 4.37, now normal TSH and normal FT4 . Euthyroid.   HTN: on antihypertensive medications, monitored, asymptomatic.        Discussed plan and management wit Dr Jimi Krause MD  Cell: [phone]  Office: [phone]

## 2023-03-31 NOTE — PROGRESS NOTE ADULT - SUBJECTIVE AND OBJECTIVE BOX
VITAL SIGNS    Telemetry:    Vital Signs Last 24 Hrs  T(C): 37.7 (23 @ 00:00), Max: 37.7 (23 @ 00:00)  T(F): 99.9 (23 @ 00:00), Max: 99.9 (23 @ 00:00)  HR: 90 (23 @ 05:27) (73 - 109)  BP: 127/70 (23 @ 22:00) (78/49 - 131/74)  RR: 14 (23 @ 05:27) (12 - 28)  SpO2: 100% (23 @ 05:27) (96% - 100%)            :  -   07:00  --------------------------------------------------------  IN: 1801.2 mL / OUT: 2645 mL / NET: -843.8 mL       Daily     Daily Weight in k (31 Mar 2023 00:00)  Admit Wt: Drug Dosing Weight  Height (cm): 167.6 (28 Mar 2023 08:09)  Weight (kg): 78 (28 Mar 2023 08:09)  BMI (kg/m2): 27.8 (28 Mar 2023 08:09)  BSA (m2): 1.88 (28 Mar 2023 08:09)    Bilirubin Total, Serum: 0.4 mg/dL ( @ 01:02)    CAPILLARY BLOOD GLUCOSE      POCT Blood Glucose.: 193 mg/dL (30 Mar 2023 21:27)  POCT Blood Glucose.: 177 mg/dL (30 Mar 2023 20:00)  POCT Blood Glucose.: 189 mg/dL (30 Mar 2023 17:34)  POCT Blood Glucose.: 220 mg/dL (30 Mar 2023 11:41)  POCT Blood Glucose.: 203 mg/dL (30 Mar 2023 08:01)          LABS:     07:01  -   07:00  --------------------------------------------------------  IN: 1801.2 mL / OUT: 2645 mL / NET: -843.8 mL    cret                        7.8    10.56 )-----------( 192      ( 31 Mar 2023 01:02 )             24.8         135  |  109<H>  |  9   ----------------------------<  136<H>  3.6   |  25  |  0.49<L>    Ca    8.7      31 Mar 2023 01:02  Phos  2.2       Mg     1.7         TPro  6.0  /  Alb  3.5  /  TBili  0.4  /  DBili  x   /  AST  15  /  ALT  14  /  AlkPhos  45          acetaminophen     Tablet .. 650 milliGRAM(s) Oral every 6 hours  acetaminophen     Tablet .. 650 milliGRAM(s) Oral every 6 hours PRN  aMIOdarone    Tablet 400 milliGRAM(s) Oral two times a day  ascorbic acid 500 milliGRAM(s) Oral two times a day  aspirin enteric coated 81 milliGRAM(s) Oral daily  atorvastatin 80 milliGRAM(s) Oral at bedtime  bisacodyl Suppository 10 milliGRAM(s) Rectal once  chlorhexidine 2% Cloths 1 Application(s) Topical daily  clopidogrel Tablet 75 milliGRAM(s) Oral daily  enoxaparin Injectable 40 milliGRAM(s) SubCutaneous every 24 hours  gabapentin 100 milliGRAM(s) Oral every 8 hours  insulin glargine Injectable (LANTUS) 40 Unit(s) SubCutaneous at bedtime  insulin lispro (ADMELOG) corrective regimen sliding scale   SubCutaneous three times a day before meals  insulin lispro (ADMELOG) corrective regimen sliding scale   SubCutaneous at bedtime  insulin lispro Injectable (ADMELOG) 9 Unit(s) SubCutaneous three times a day before meals  lidocaine   4% Patch 1 Patch Transdermal daily  ondansetron Injectable 4 milliGRAM(s) IV Push once  oxyCODONE    IR 5 milliGRAM(s) Oral every 4 hours PRN  oxyCODONE    IR 10 milliGRAM(s) Oral every 4 hours PRN  pantoprazole    Tablet 40 milliGRAM(s) Oral before breakfast  polyethylene glycol 3350 17 Gram(s) Oral daily  potassium chloride  10 mEq/50 mL IVPB 10 milliEquivalent(s) IV Intermittent every 1 hour  senna 2 Tablet(s) Oral at bedtime  sodium chloride 0.9%. 1000 milliLiter(s) IV Continuous <Continuous>      PHYSICAL EXAM    Subjective: "Hi.   Neurology: alert and oriented x 3, nonfocal, no gross deficits  CV : tele:  RSR  Sternal Wound :  CDI with dressing , Stable  Lungs: clear. RR easy, unlabored   Abdomen: soft, nontender, nondistended, positive bowel sounds, bowel movement   Neg N/V/D   :  pt voiding without difficulty   Extremities:   FITCH; edema, neg calf tenderness.   PPP bilaterally      PW:  Chest tubes:                 VITAL SIGNS    Telemetry:  RSR/ST   Vital Signs Last 24 Hrs  T(C): 37.7 (23 @ 00:00), Max: 37.7 (23 @ 00:00)  T(F): 99.9 (23 @ 00:00), Max: 99.9 (23 @ 00:00)  HR: 90 (23 @ 05:27) (73 - 109)  BP: 127/70 (23 @ 22:00) (78/49 - 131/74)  RR: 14 (23 @ 05:27) (12 - 28)  SpO2: 100% (23 @ 05:27) (96% - 100%)            :  -   07:00  --------------------------------------------------------  IN: 1801.2 mL / OUT: 2645 mL / NET: -843.8 mL       Daily     Daily Weight in k (31 Mar 2023 00:00)  Admit Wt: Drug Dosing Weight  Height (cm): 167.6 (28 Mar 2023 08:09)  Weight (kg): 78 (28 Mar 2023 08:09)  BMI (kg/m2): 27.8 (28 Mar 2023 08:09)  BSA (m2): 1.88 (28 Mar 2023 08:09)    Bilirubin Total, Serum: 0.4 mg/dL ( @ 01:02)    CAPILLARY BLOOD GLUCOSE      POCT Blood Glucose.: 193 mg/dL (30 Mar 2023 21:27)  POCT Blood Glucose.: 177 mg/dL (30 Mar 2023 20:00)  POCT Blood Glucose.: 189 mg/dL (30 Mar 2023 17:34)  POCT Blood Glucose.: 220 mg/dL (30 Mar 2023 11:41)  POCT Blood Glucose.: 203 mg/dL (30 Mar 2023 08:01)          LABS:     @ 07:01  -   07:00  --------------------------------------------------------  IN: 1801.2 mL / OUT: 2645 mL / NET: -843.8 mL    cret                        7.8    10.56 )-----------( 192      ( 31 Mar 2023 01:02 )             24.8         135  |  109<H>  |  9   ----------------------------<  136<H>  3.6   |  25  |  0.49<L>    Ca    8.7      31 Mar 2023 01:02  Phos  2.2       Mg     1.7         TPro  6.0  /  Alb  3.5  /  TBili  0.4  /  DBili  x   /  AST  15  /  ALT  14  /  AlkPhos  45          acetaminophen     Tablet .. 650 milliGRAM(s) Oral every 6 hours  acetaminophen     Tablet .. 650 milliGRAM(s) Oral every 6 hours PRN  aMIOdarone    Tablet 400 milliGRAM(s) Oral two times a day  ascorbic acid 500 milliGRAM(s) Oral two times a day  aspirin enteric coated 81 milliGRAM(s) Oral daily  atorvastatin 80 milliGRAM(s) Oral at bedtime  bisacodyl Suppository 10 milliGRAM(s) Rectal once  chlorhexidine 2% Cloths 1 Application(s) Topical daily  clopidogrel Tablet 75 milliGRAM(s) Oral daily  enoxaparin Injectable 40 milliGRAM(s) SubCutaneous every 24 hours  gabapentin 100 milliGRAM(s) Oral every 8 hours  insulin glargine Injectable (LANTUS) 40 Unit(s) SubCutaneous at bedtime  insulin lispro (ADMELOG) corrective regimen sliding scale   SubCutaneous three times a day before meals  insulin lispro (ADMELOG) corrective regimen sliding scale   SubCutaneous at bedtime  insulin lispro Injectable (ADMELOG) 9 Unit(s) SubCutaneous three times a day before meals  lidocaine   4% Patch 1 Patch Transdermal daily  ondansetron Injectable 4 milliGRAM(s) IV Push once  oxyCODONE    IR 5 milliGRAM(s) Oral every 4 hours PRN  oxyCODONE    IR 10 milliGRAM(s) Oral every 4 hours PRN  pantoprazole    Tablet 40 milliGRAM(s) Oral before breakfast  polyethylene glycol 3350 17 Gram(s) Oral daily  potassium chloride  10 mEq/50 mL IVPB 10 milliEquivalent(s) IV Intermittent every 1 hour  senna 2 Tablet(s) Oral at bedtime  sodium chloride 0.9%. 1000 milliLiter(s) IV Continuous <Continuous>      PHYSICAL EXAM    Subjective: "I have pain."  Neurology: alert and oriented x 3, nonfocal, no gross deficits  CV : tele:  RSR/ ST    RIJ/ INTRO  Lt inframmary incision cdi khris w/ SS   Lungs: clear diminished left base;  RR easy, unlabored   Abdomen: soft, nontender, nondistended, positive bowel sounds, + bowel movement   Neg N/V/D; obese abdomen   :  martinez d/c this am --> pt DTV   Extremities:   FITCH; trace LE edema, neg calf tenderness.   PPP bilaterally; left radial della      PW: no  Chest tubes: + left ct--> d/c this am

## 2023-03-31 NOTE — PROGRESS NOTE ADULT - ASSESSMENT
47 f with    CAD/ Chest pain/ Abnormal CT coronaries  / s/p Robotic CABG  - telemetry  - ASA  - BB  - Cardiology follow    Diabetes Mellitus  - BS control  - ADA diet     DVT prophylaxis  - low risk     PT    DCP home.     Jan Mitchell MD phone [phone]

## 2023-03-31 NOTE — PROGRESS NOTE ADULT - SUBJECTIVE AND OBJECTIVE BOX
Chief complaint  Patient is a 47y old  Female who presents with a chief complaint of CP (30 Mar 2023 14:58)         Labs and Fingersticks  CAPILLARY BLOOD GLUCOSE      POCT Blood Glucose.: 236 mg/dL (31 Mar 2023 11:04)  POCT Blood Glucose.: 129 mg/dL (31 Mar 2023 07:21)  POCT Blood Glucose.: 193 mg/dL (30 Mar 2023 21:27)  POCT Blood Glucose.: 177 mg/dL (30 Mar 2023 20:00)  POCT Blood Glucose.: 189 mg/dL (30 Mar 2023 17:34)      Anion Gap, Serum: 8 (03-30 @ 00:11)      Calcium, Total Serum: 8.7 (03-31 @ 01:02)  Calcium, Total Serum: 8.7 (03-30 @ 00:11)  Albumin, Serum: 3.5 (03-31 @ 01:02)  Albumin, Serum: 3.7 (03-30 @ 00:11)    Alanine Aminotransferase (ALT/SGPT): 14 (03-31 @ 01:02)  Alanine Aminotransferase (ALT/SGPT): 14 (03-30 @ 00:11)  Alkaline Phosphatase, Serum: 45 (03-31 @ 01:02)  Alkaline Phosphatase, Serum: 48 (03-30 @ 00:11)  Aspartate Aminotransferase (AST/SGOT): 15 (03-31 @ 01:02)  Aspartate Aminotransferase (AST/SGOT): 19 (03-30 @ 00:11)        03-31    135  |  109<H>  |  9   ----------------------------<  136<H>  3.6   |  25  |  0.49<L>    Ca    8.7      31 Mar 2023 01:02  Phos  2.2     03-31  Mg     1.7     03-31    TPro  6.0  /  Alb  3.5  /  TBili  0.4  /  DBili  x   /  AST  15  /  ALT  14  /  AlkPhos  45  03-31                        7.8    10.56 )-----------( 192      ( 31 Mar 2023 01:02 )             24.8     Medications  MEDICATIONS  (STANDING):  ascorbic acid 500 milliGRAM(s) Oral two times a day  aspirin enteric coated 81 milliGRAM(s) Oral daily  atorvastatin 80 milliGRAM(s) Oral at bedtime  bisacodyl Suppository 10 milliGRAM(s) Rectal once  chlorhexidine 2% Cloths 1 Application(s) Topical daily  clopidogrel Tablet 75 milliGRAM(s) Oral daily  enoxaparin Injectable 40 milliGRAM(s) SubCutaneous every 24 hours  gabapentin 100 milliGRAM(s) Oral every 8 hours  insulin glargine Injectable (LANTUS) 40 Unit(s) SubCutaneous at bedtime  insulin lispro (ADMELOG) corrective regimen sliding scale   SubCutaneous three times a day before meals  insulin lispro (ADMELOG) corrective regimen sliding scale   SubCutaneous at bedtime  insulin lispro Injectable (ADMELOG) 10 Unit(s) SubCutaneous three times a day before meals  lidocaine   4% Patch 1 Patch Transdermal daily  metoprolol tartrate 12.5 milliGRAM(s) Oral two times a day  ondansetron Injectable 4 milliGRAM(s) IV Push once  pantoprazole    Tablet 40 milliGRAM(s) Oral before breakfast  polyethylene glycol 3350 17 Gram(s) Oral daily  senna 2 Tablet(s) Oral at bedtime  sodium chloride 0.9%. 1000 milliLiter(s) (10 mL/Hr) IV Continuous <Continuous>      Physical Exam  General: Patient comfortable in chair  Vital Signs Last 12 Hrs  T(F): 97.9 (03-31-23 @ 11:00), Max: 98.1 (03-31-23 @ 07:20)  HR: 89 (03-31-23 @ 11:00) (87 - 109)  BP: 110/66 (03-31-23 @ 11:00) (102/62 - 110/66)  BP(mean): 82 (03-31-23 @ 11:00) (75 - 82)  RR: 18 (03-31-23 @ 11:00) (12 - 21)  SpO2: 100% (03-31-23 @ 11:00) (100% - 100%)    CVS: S1S2   Respiratory: No wheezing, no crepitations  GI: Abdomen soft, bowel sounds positive  Musculoskeletal:  moves all extremities  : Voiding        Chief complaint  Patient is a 47y old  Female who presents with a chief complaint of CP (30 Mar 2023 14:58)     Labs and Fingersticks  CAPILLARY BLOOD GLUCOSE      POCT Blood Glucose.: 236 mg/dL (31 Mar 2023 11:04)  POCT Blood Glucose.: 129 mg/dL (31 Mar 2023 07:21)  POCT Blood Glucose.: 193 mg/dL (30 Mar 2023 21:27)  POCT Blood Glucose.: 177 mg/dL (30 Mar 2023 20:00)  POCT Blood Glucose.: 189 mg/dL (30 Mar 2023 17:34)      Anion Gap, Serum: 8 (03-30 @ 00:11)      Calcium, Total Serum: 8.7 (03-31 @ 01:02)  Calcium, Total Serum: 8.7 (03-30 @ 00:11)  Albumin, Serum: 3.5 (03-31 @ 01:02)  Albumin, Serum: 3.7 (03-30 @ 00:11)    Alanine Aminotransferase (ALT/SGPT): 14 (03-31 @ 01:02)  Alanine Aminotransferase (ALT/SGPT): 14 (03-30 @ 00:11)  Alkaline Phosphatase, Serum: 45 (03-31 @ 01:02)  Alkaline Phosphatase, Serum: 48 (03-30 @ 00:11)  Aspartate Aminotransferase (AST/SGOT): 15 (03-31 @ 01:02)  Aspartate Aminotransferase (AST/SGOT): 19 (03-30 @ 00:11)        03-31    135  |  109<H>  |  9   ----------------------------<  136<H>  3.6   |  25  |  0.49<L>    Ca    8.7      31 Mar 2023 01:02  Phos  2.2     03-31  Mg     1.7     03-31    TPro  6.0  /  Alb  3.5  /  TBili  0.4  /  DBili  x   /  AST  15  /  ALT  14  /  AlkPhos  45  03-31                        7.8    10.56 )-----------( 192      ( 31 Mar 2023 01:02 )             24.8     Medications  MEDICATIONS  (STANDING):  ascorbic acid 500 milliGRAM(s) Oral two times a day  aspirin enteric coated 81 milliGRAM(s) Oral daily  atorvastatin 80 milliGRAM(s) Oral at bedtime  bisacodyl Suppository 10 milliGRAM(s) Rectal once  chlorhexidine 2% Cloths 1 Application(s) Topical daily  clopidogrel Tablet 75 milliGRAM(s) Oral daily  enoxaparin Injectable 40 milliGRAM(s) SubCutaneous every 24 hours  gabapentin 100 milliGRAM(s) Oral every 8 hours  insulin glargine Injectable (LANTUS) 40 Unit(s) SubCutaneous at bedtime  insulin lispro (ADMELOG) corrective regimen sliding scale   SubCutaneous three times a day before meals  insulin lispro (ADMELOG) corrective regimen sliding scale   SubCutaneous at bedtime  insulin lispro Injectable (ADMELOG) 10 Unit(s) SubCutaneous three times a day before meals  lidocaine   4% Patch 1 Patch Transdermal daily  metoprolol tartrate 12.5 milliGRAM(s) Oral two times a day  ondansetron Injectable 4 milliGRAM(s) IV Push once  pantoprazole    Tablet 40 milliGRAM(s) Oral before breakfast  polyethylene glycol 3350 17 Gram(s) Oral daily  senna 2 Tablet(s) Oral at bedtime  sodium chloride 0.9%. 1000 milliLiter(s) (10 mL/Hr) IV Continuous <Continuous>      Physical Exam  General: Patient comfortable in chair  Vital Signs Last 12 Hrs  T(F): 97.9 (03-31-23 @ 11:00), Max: 98.1 (03-31-23 @ 07:20)  HR: 89 (03-31-23 @ 11:00) (87 - 109)  BP: 110/66 (03-31-23 @ 11:00) (102/62 - 110/66)  BP(mean): 82 (03-31-23 @ 11:00) (75 - 82)  RR: 18 (03-31-23 @ 11:00) (12 - 21)  SpO2: 100% (03-31-23 @ 11:00) (100% - 100%)    CVS: S1S2   Respiratory: No wheezing, no crepitations  GI: Abdomen soft, bowel sounds positive  Musculoskeletal:  moves all extremities  : Voiding

## 2023-03-31 NOTE — PROGRESS NOTE ADULT - ASSESSMENT
47yoF presenting to the ED yesterday with a HA over last couple days.   During her evaluation she noted that she has had several episodes of intermittent left sided CP over the last couple days. States that it is exertional in nature and radiates to her left arm. States that she can walk about 1/2block before she starts to have symptoms. Prior to this she reports that she had a [place] but her ECG's were normal so no further evaluation was done. She denies LE edema, orthopnea, PND.   Her ECG was NSR without ischemic findings and her Trop T was <6.   As a result she was ordered for a CTa coronaries which is concerning for a severe obstruction of the pLAD.  (18 Mar 2023 20:02)        47yoF presenting to the ED yesterday with a HA over last couple days.   During her evaluation she noted that she has had several episodes of intermittent left sided CP over the last couple days. States that it is exertional in nature and radiates to her left arm. States that she can walk about 1/2block before she starts to have symptoms. Prior to this she reports that she had a couple [place] lived [place] but her ECG's were normal so no further evaluation was done. She denies LE edema, orthopnea, PND.   Her ECG was NSR without ischemic findings and her Trop T was <6.   As a result she was ordered for a CTa coronaries which is concerning for a severe obstruction of the pLAD.  (18 Mar 2023 20:02)  s/p 3/28/23 robotic midcabg   postop 3/30 orthostatic; prbc given; levo started; echo neg pericardial effusion; levo d/c  3/31 tx sdu; RSR/ ST ; low dose bb started; lt ct d/c this am; ck f/u chest xray; d/c rij/ della/ michelle ; pain management  endo following for dm management; lantus/ admelog adjusted as per endo   Discharge planning- home when stable

## 2023-03-31 NOTE — PROGRESS NOTE ADULT - SUBJECTIVE AND OBJECTIVE BOX
SUBJ:  CABG 3/28  transferred out of CTU off inotropes     MEDICATIONS  (STANDING):  ascorbic acid 500 milliGRAM(s) Oral two times a day  aspirin enteric coated 81 milliGRAM(s) Oral daily  atorvastatin 80 milliGRAM(s) Oral at bedtime  bisacodyl Suppository 10 milliGRAM(s) Rectal once  chlorhexidine 2% Cloths 1 Application(s) Topical daily  clopidogrel Tablet 75 milliGRAM(s) Oral daily  enoxaparin Injectable 40 milliGRAM(s) SubCutaneous every 24 hours  gabapentin 100 milliGRAM(s) Oral every 8 hours  insulin glargine Injectable (LANTUS) 40 Unit(s) SubCutaneous at bedtime  insulin lispro (ADMELOG) corrective regimen sliding scale   SubCutaneous three times a day before meals  insulin lispro (ADMELOG) corrective regimen sliding scale   SubCutaneous at bedtime  insulin lispro Injectable (ADMELOG) 10 Unit(s) SubCutaneous three times a day before meals  lidocaine   4% Patch 1 Patch Transdermal daily  metoprolol tartrate 12.5 milliGRAM(s) Oral two times a day  ondansetron Injectable 4 milliGRAM(s) IV Push once  pantoprazole    Tablet 40 milliGRAM(s) Oral before breakfast  polyethylene glycol 3350 17 Gram(s) Oral daily  senna 2 Tablet(s) Oral at bedtime  sodium chloride 0.9%. 1000 milliLiter(s) (10 mL/Hr) IV Continuous <Continuous>    MEDICATIONS  (PRN):  acetaminophen     Tablet .. 650 milliGRAM(s) Oral every 6 hours PRN Mild Pain (1 - 3)  oxyCODONE    IR 5 milliGRAM(s) Oral every 4 hours PRN Moderate Pain (4 - 6)  oxyCODONE    IR 10 milliGRAM(s) Oral every 4 hours PRN Severe Pain (7 - 10)    Vital Signs Last 24 Hrs  T(C): 36.7 (31 Mar 2023 14:43), Max: 37.7 (31 Mar 2023 00:00)  T(F): 98 (31 Mar 2023 14:43), Max: 99.9 (31 Mar 2023 00:00)  HR: 90 (31 Mar 2023 14:43) (85 - 109)  BP: 109/69 (31 Mar 2023 14:43) (102/62 - 129/80)  BP(mean): 85 (31 Mar 2023 14:43) (75 - 89)  RR: 18 (31 Mar 2023 14:43) (12 - 24)  SpO2: 100% (31 Mar 2023 14:43) (99% - 100%)    Parameters below as of 31 Mar 2023 14:43  Patient On (Oxygen Delivery Method): room air      REVIEW OF SYSTEMS:  CONSTITUTIONAL: No fever, weight loss, or fatigue  EYES: No eye pain, visual disturbances, or discharge  ENMT:  No difficulty hearing, tinnitus, vertigo; No sinus or throat pain  NECK: No pain or stiffness  RESPIRATORY: No cough, wheezing, chills or hemoptysis; No shortness of breath  CARDIOVASCULAR: No chest pain, palpitations, dizziness, or leg swelling  GASTROINTESTINAL: No abdominal or epigastric pain. No nausea, vomiting, or hematemesis; No diarrhea or constipation. No melena or hematochezia.  GENITOURINARY: No dysuria, frequency, hematuria, or incontinence  NEUROLOGICAL: No headaches, memory loss, loss of strength, numbness, or tremors  SKIN: No itching, burning, rashes, or lesions   LYMPH NODES: No enlarged glands  ENDOCRINE: No heat or cold intolerance; No hair loss  MUSCULOSKELETAL: No joint pain or swelling; No muscle, back, or extremity pain  PSYCHIATRIC: No depression, anxiety, mood swings, or difficulty sleeping  HEME/LYMPH: No easy bruising, or bleeding gums  ALLERY AND IMMUNOLOGIC: No hives or eczema          PHYSICAL EXAM:  · CONSTITUTIONAL: Well-developed, well nourished      · EYES: EOMI; PERRL; no drainage or redness  · NECK: + lines  ·RESPIRATORY:   airway patent; breath sounds equal; good air movement; respirations non-labored; c  · CARDIOVASCULAR: regular rate and rhythm  no rub  no murmur  normal PMI  chest wall incisions clean and dry, + tender  . GASTROINTESTINAL:  no distention; no masses palpable; bowel sounds normal  · EXTREMITIES: No cyanosis, clubbing or edema  · VASCULAR:  Equal and normal pulses (radial, femoral)  ·NEUROLOGICAL:  Alert and oriented x 3; sensation intact; deep reflexes intact; cranial nerves intact; no spontaneous movement; superficial reflexes intact; normal strength  · SKIN: No lesions; no rash  . LYMPH NODES: No lymphadedenopathy  · MUSCULOSKELETAL:  No calf tenderness  no joint swelling	      LABS:                                         7.8    10.56 )-----------( 192      ( 31 Mar 2023 01:02 )             24.8   03-31    135  |  109<H>  |  9   ----------------------------<  136<H>  3.6   |  25  |  0.49<L>    Ca    8.7      31 Mar 2023 01:02  Phos  2.2     03-31  Mg     1.7     03-31    TPro  6.0  /  Alb  3.5  /  TBili  0.4  /  DBili  x   /  AST  15  /  ALT  14  /  AlkPhos  45  03-31    RADIOLOGY & ADDITIONAL STUDIES:    IMPRESSION AND PLAN:      RAGHAV LAINEZ is a 48y/o Female with PMHx of DM2 on metformin who presentd with CP found to have severe obstruction of the pLAD. Pt now s/p LHC demonstrating 100% stenosis in ostial LAD    successful CABG  3/28  ECHo from yesterday reviewed no effusion or tamponade + pleural effusions , nml EF  begin diuresis    CAD  for CABG  early onset CAD  LDL not at level consistent with familial hyperlipidemia    continue   aspirin enteric coated 81 milliGRAM(s) Oral daily  atorvastatin 80 milliGRAM(s) Oral at bedtime  clopidogrel Tablet 75 milliGRAM(s) Oral daily  metoprolol tartrate 12.5 milliGRAM(s) Oral two times a day    begin lasix 40mg IV daily as BP tolerates      She will need an outpt cardiologist in [place], please arrange    Gustavo Espitia MD, PhD  Cardiology Attending  [facility]    For day time coverage Mon-Fri see Non-Service Consult Attending on [email or web address], password: [email or web address]; daytime weekends covered by general cardiology consult service attending.)

## 2023-03-31 NOTE — PROGRESS NOTE ADULT - SUBJECTIVE AND OBJECTIVE BOX
Patient is a 47y old  Female who presents with a chief complaint of CP (31 Mar 2023 16:35)      SUBJECTIVE / OVERNIGHT EVENTS: Comfortable without new complaints.   Review of Systems  chest pain no  palpitations no   sob no   nausea no  headache no    MEDICATIONS  (STANDING):  ascorbic acid 500 milliGRAM(s) Oral two times a day  aspirin enteric coated 81 milliGRAM(s) Oral daily  atorvastatin 80 milliGRAM(s) Oral at bedtime  bisacodyl Suppository 10 milliGRAM(s) Rectal once  chlorhexidine 2% Cloths 1 Application(s) Topical daily  clopidogrel Tablet 75 milliGRAM(s) Oral daily  enoxaparin Injectable 40 milliGRAM(s) SubCutaneous every 24 hours  gabapentin 100 milliGRAM(s) Oral every 8 hours  insulin glargine Injectable (LANTUS) 40 Unit(s) SubCutaneous at bedtime  insulin lispro (ADMELOG) corrective regimen sliding scale   SubCutaneous three times a day before meals  insulin lispro (ADMELOG) corrective regimen sliding scale   SubCutaneous at bedtime  insulin lispro Injectable (ADMELOG) 10 Unit(s) SubCutaneous three times a day before meals  lidocaine   4% Patch 1 Patch Transdermal daily  metoprolol tartrate 12.5 milliGRAM(s) Oral two times a day  ondansetron Injectable 4 milliGRAM(s) IV Push once  pantoprazole    Tablet 40 milliGRAM(s) Oral before breakfast  polyethylene glycol 3350 17 Gram(s) Oral daily  senna 2 Tablet(s) Oral at bedtime    MEDICATIONS  (PRN):  acetaminophen     Tablet .. 650 milliGRAM(s) Oral every 6 hours PRN Mild Pain (1 - 3)  oxyCODONE    IR 5 milliGRAM(s) Oral every 4 hours PRN Moderate Pain (4 - 6)  oxyCODONE    IR 10 milliGRAM(s) Oral every 4 hours PRN Severe Pain (7 - 10)      Vital Signs Last 24 Hrs  T(C): 36.7 (31 Mar 2023 14:43), Max: 37.7 (31 Mar 2023 00:00)  T(F): 98 (31 Mar 2023 14:43), Max: 99.9 (31 Mar 2023 00:00)  HR: 85 (31 Mar 2023 17:20) (85 - 109)  BP: 103/53 (31 Mar 2023 17:20) (102/62 - 129/80)  BP(mean): 72 (31 Mar 2023 17:20) (72 - 89)  RR: 17 (31 Mar 2023 17:20) (12 - 24)  SpO2: 100% (31 Mar 2023 17:20) (99% - 100%)    Parameters below as of 31 Mar 2023 17:20  Patient On (Oxygen Delivery Method): room air        PHYSICAL EXAM:  GENERAL: NAD   HEAD:  Atraumatic, Normocephalic  EYES: EOMI, PERRLA, conjunctiva and sclera clear  NECK: Supple, No JVD  CHEST/LUNG: Clear to auscultation bilaterally; No wheeze Incisions healing   HEART: Regular rate and rhythm; No murmurs, rubs, or gallops   ABDOMEN: Soft, Nontender, Nondistended; Bowel sounds present   EXTREMITIES:  2+ Peripheral Pulses,   PSYCH: AAOx3  NEUROLOGY: non-focal  SKIN: No rashes or lesions    LABS:                        7.8    10.56 )-----------( 192      ( 31 Mar 2023 01:02 )             24.8     03-31    135  |  109<H>  |  9   ----------------------------<  136<H>  3.6   |  25  |  0.49<L>    Ca    8.7      31 Mar 2023 01:02  Phos  2.2     03-31  Mg     1.7     03-31    TPro  6.0  /  Alb  3.5  /  TBili  0.4  /  DBili  x   /  AST  15  /  ALT  14  /  AlkPhos  45  03-31                RADIOLOGY & ADDITIONAL TESTS:    Imaging Personally Reviewed:    Consultant(s) Notes Reviewed:      Care Discussed with Consultants/Other Providers:

## 2023-04-01 LAB
ALBUMIN SERPL ELPH-MCNC: 3.5 G/DL — SIGNIFICANT CHANGE UP (ref 3.3–5)
ALP SERPL-CCNC: 43 U/L — SIGNIFICANT CHANGE UP (ref 40–120)
ALT FLD-CCNC: 17 U/L — SIGNIFICANT CHANGE UP (ref 10–45)
ANION GAP SERPL CALC-SCNC: 11 MMOL/L — SIGNIFICANT CHANGE UP (ref 5–17)
AST SERPL-CCNC: 17 U/L — SIGNIFICANT CHANGE UP (ref 10–40)
BASOPHILS # BLD AUTO: 0.04 K/UL — SIGNIFICANT CHANGE UP (ref 0–0.2)
BASOPHILS NFR BLD AUTO: 0.4 % — SIGNIFICANT CHANGE UP (ref 0–2)
BILIRUB SERPL-MCNC: 0.4 MG/DL — SIGNIFICANT CHANGE UP (ref 0.2–1.2)
BUN SERPL-MCNC: 11 MG/DL — SIGNIFICANT CHANGE UP (ref 7–23)
CALCIUM SERPL-MCNC: 8.8 MG/DL — SIGNIFICANT CHANGE UP (ref 8.4–10.5)
CHLORIDE SERPL-SCNC: 103 MMOL/L — SIGNIFICANT CHANGE UP (ref 96–108)
CO2 SERPL-SCNC: 25 MMOL/L — SIGNIFICANT CHANGE UP (ref 22–31)
CREAT SERPL-MCNC: 0.54 MG/DL — SIGNIFICANT CHANGE UP (ref 0.5–1.3)
EGFR: 116 ML/MIN/1.73M2 — SIGNIFICANT CHANGE UP
EOSINOPHIL # BLD AUTO: 0.24 K/UL — SIGNIFICANT CHANGE UP (ref 0–0.5)
EOSINOPHIL NFR BLD AUTO: 2.2 % — SIGNIFICANT CHANGE UP (ref 0–6)
GLUCOSE BLDC GLUCOMTR-MCNC: 133 MG/DL — HIGH (ref 70–99)
GLUCOSE BLDC GLUCOMTR-MCNC: 159 MG/DL — HIGH (ref 70–99)
GLUCOSE BLDC GLUCOMTR-MCNC: 202 MG/DL — HIGH (ref 70–99)
GLUCOSE BLDC GLUCOMTR-MCNC: 85 MG/DL — SIGNIFICANT CHANGE UP (ref 70–99)
GLUCOSE SERPL-MCNC: 77 MG/DL — SIGNIFICANT CHANGE UP (ref 70–99)
HCT VFR BLD CALC: 24.5 % — LOW (ref 34.5–45)
HGB BLD-MCNC: 7.6 G/DL — LOW (ref 11.5–15.5)
IMM GRANULOCYTES NFR BLD AUTO: 0.4 % — SIGNIFICANT CHANGE UP (ref 0–0.9)
LYMPHOCYTES # BLD AUTO: 3.98 K/UL — HIGH (ref 1–3.3)
LYMPHOCYTES # BLD AUTO: 36.5 % — SIGNIFICANT CHANGE UP (ref 13–44)
MAGNESIUM SERPL-MCNC: 1.7 MG/DL — SIGNIFICANT CHANGE UP (ref 1.6–2.6)
MCHC RBC-ENTMCNC: 27 PG — SIGNIFICANT CHANGE UP (ref 27–34)
MCHC RBC-ENTMCNC: 31 GM/DL — LOW (ref 32–36)
MCV RBC AUTO: 86.9 FL — SIGNIFICANT CHANGE UP (ref 80–100)
MONOCYTES # BLD AUTO: 0.88 K/UL — SIGNIFICANT CHANGE UP (ref 0–0.9)
MONOCYTES NFR BLD AUTO: 8.1 % — SIGNIFICANT CHANGE UP (ref 2–14)
NEUTROPHILS # BLD AUTO: 5.73 K/UL — SIGNIFICANT CHANGE UP (ref 1.8–7.4)
NEUTROPHILS NFR BLD AUTO: 52.4 % — SIGNIFICANT CHANGE UP (ref 43–77)
NRBC # BLD: 0 /100 WBCS — SIGNIFICANT CHANGE UP (ref 0–0)
PHOSPHATE SERPL-MCNC: 3 MG/DL — SIGNIFICANT CHANGE UP (ref 2.5–4.5)
PLATELET # BLD AUTO: 238 K/UL — SIGNIFICANT CHANGE UP (ref 150–400)
POTASSIUM SERPL-MCNC: 4.2 MMOL/L — SIGNIFICANT CHANGE UP (ref 3.5–5.3)
POTASSIUM SERPL-SCNC: 4.2 MMOL/L — SIGNIFICANT CHANGE UP (ref 3.5–5.3)
PROT SERPL-MCNC: 6.1 G/DL — SIGNIFICANT CHANGE UP (ref 6–8.3)
RBC # BLD: 2.82 M/UL — LOW (ref 3.8–5.2)
RBC # FLD: 13.7 % — SIGNIFICANT CHANGE UP (ref 10.3–14.5)
SODIUM SERPL-SCNC: 139 MMOL/L — SIGNIFICANT CHANGE UP (ref 135–145)
WBC # BLD: 10.91 K/UL — HIGH (ref 3.8–10.5)
WBC # FLD AUTO: 10.91 K/UL — HIGH (ref 3.8–10.5)

## 2023-04-01 RX ORDER — ACETAMINOPHEN 500 MG
650 TABLET ORAL EVERY 6 HOURS
Refills: 0 | Status: DISCONTINUED | OUTPATIENT
Start: 2023-04-01 | End: 2023-04-02

## 2023-04-01 RX ORDER — METOPROLOL TARTRATE 50 MG
25 TABLET ORAL
Refills: 0 | Status: DISCONTINUED | OUTPATIENT
Start: 2023-04-01 | End: 2023-04-02

## 2023-04-01 RX ORDER — INSULIN GLARGINE 100 [IU]/ML
32 INJECTION, SOLUTION SUBCUTANEOUS AT BEDTIME
Refills: 0 | Status: DISCONTINUED | OUTPATIENT
Start: 2023-04-01 | End: 2023-04-02

## 2023-04-01 RX ORDER — INSULIN LISPRO 100/ML
7 VIAL (ML) SUBCUTANEOUS
Refills: 0 | Status: DISCONTINUED | OUTPATIENT
Start: 2023-04-01 | End: 2023-04-02

## 2023-04-01 RX ADMIN — Medication 650 MILLIGRAM(S): at 21:29

## 2023-04-01 RX ADMIN — Medication 7 UNIT(S): at 16:45

## 2023-04-01 RX ADMIN — ATORVASTATIN CALCIUM 80 MILLIGRAM(S): 80 TABLET, FILM COATED ORAL at 21:26

## 2023-04-01 RX ADMIN — GABAPENTIN 100 MILLIGRAM(S): 400 CAPSULE ORAL at 13:56

## 2023-04-01 RX ADMIN — Medication 1: at 12:06

## 2023-04-01 RX ADMIN — Medication 5 MILLIGRAM(S): at 16:45

## 2023-04-01 RX ADMIN — Medication 500 MILLIGRAM(S): at 17:04

## 2023-04-01 RX ADMIN — Medication 25 MILLIGRAM(S): at 17:04

## 2023-04-01 RX ADMIN — OXYCODONE HYDROCHLORIDE 5 MILLIGRAM(S): 5 TABLET ORAL at 10:45

## 2023-04-01 RX ADMIN — OXYCODONE HYDROCHLORIDE 5 MILLIGRAM(S): 5 TABLET ORAL at 09:58

## 2023-04-01 RX ADMIN — Medication 7 UNIT(S): at 11:55

## 2023-04-01 RX ADMIN — Medication 650 MILLIGRAM(S): at 12:50

## 2023-04-01 RX ADMIN — CLOPIDOGREL BISULFATE 75 MILLIGRAM(S): 75 TABLET, FILM COATED ORAL at 12:00

## 2023-04-01 RX ADMIN — Medication 10 UNIT(S): at 08:00

## 2023-04-01 RX ADMIN — Medication 650 MILLIGRAM(S): at 22:42

## 2023-04-01 RX ADMIN — INSULIN GLARGINE 32 UNIT(S): 100 INJECTION, SOLUTION SUBCUTANEOUS at 21:33

## 2023-04-01 RX ADMIN — Medication 81 MILLIGRAM(S): at 12:00

## 2023-04-01 RX ADMIN — POLYETHYLENE GLYCOL 3350 17 GRAM(S): 17 POWDER, FOR SOLUTION ORAL at 11:59

## 2023-04-01 RX ADMIN — Medication 650 MILLIGRAM(S): at 11:59

## 2023-04-01 RX ADMIN — GABAPENTIN 100 MILLIGRAM(S): 400 CAPSULE ORAL at 05:11

## 2023-04-01 RX ADMIN — CHLORHEXIDINE GLUCONATE 1 APPLICATION(S): 213 SOLUTION TOPICAL at 06:00

## 2023-04-01 RX ADMIN — ENOXAPARIN SODIUM 40 MILLIGRAM(S): 100 INJECTION SUBCUTANEOUS at 12:08

## 2023-04-01 RX ADMIN — Medication 500 MILLIGRAM(S): at 05:11

## 2023-04-01 RX ADMIN — Medication 12.5 MILLIGRAM(S): at 05:11

## 2023-04-01 RX ADMIN — Medication 3 MILLIGRAM(S): at 21:27

## 2023-04-01 RX ADMIN — GABAPENTIN 100 MILLIGRAM(S): 400 CAPSULE ORAL at 21:26

## 2023-04-01 RX ADMIN — PANTOPRAZOLE SODIUM 40 MILLIGRAM(S): 20 TABLET, DELAYED RELEASE ORAL at 06:41

## 2023-04-01 NOTE — PROGRESS NOTE ADULT - SUBJECTIVE AND OBJECTIVE BOX
Patient is a 47y old  Female who presents with a chief complaint of CAD (01 Apr 2023 11:44)      SUBJECTIVE / OVERNIGHT EVENTS: Comfortable without new complaints.   Review of Systems  chest pain no  palpitations no  sob no  nausea no  headache no    MEDICATIONS  (STANDING):  ascorbic acid 500 milliGRAM(s) Oral two times a day  aspirin enteric coated 81 milliGRAM(s) Oral daily  atorvastatin 80 milliGRAM(s) Oral at bedtime  bisacodyl Suppository 10 milliGRAM(s) Rectal once  chlorhexidine 2% Cloths 1 Application(s) Topical daily  clopidogrel Tablet 75 milliGRAM(s) Oral daily  enoxaparin Injectable 40 milliGRAM(s) SubCutaneous every 24 hours  gabapentin 100 milliGRAM(s) Oral every 8 hours  insulin glargine Injectable (LANTUS) 32 Unit(s) SubCutaneous at bedtime  insulin lispro (ADMELOG) corrective regimen sliding scale   SubCutaneous three times a day before meals  insulin lispro (ADMELOG) corrective regimen sliding scale   SubCutaneous at bedtime  insulin lispro Injectable (ADMELOG) 7 Unit(s) SubCutaneous three times a day before meals  lidocaine   4% Patch 1 Patch Transdermal daily  melatonin 3 milliGRAM(s) Oral at bedtime  metoprolol tartrate 12.5 milliGRAM(s) Oral two times a day  ondansetron Injectable 4 milliGRAM(s) IV Push once  pantoprazole    Tablet 40 milliGRAM(s) Oral before breakfast  polyethylene glycol 3350 17 Gram(s) Oral daily  senna 2 Tablet(s) Oral at bedtime    MEDICATIONS  (PRN):  acetaminophen     Tablet .. 650 milliGRAM(s) Oral every 6 hours PRN Mild Pain (1 - 3)  oxyCODONE    IR 5 milliGRAM(s) Oral every 4 hours PRN Moderate Pain (4 - 6)  oxyCODONE    IR 10 milliGRAM(s) Oral every 4 hours PRN Severe Pain (7 - 10)      Vital Signs Last 24 Hrs  T(C): 37.1 (01 Apr 2023 11:17), Max: 37.4 (31 Mar 2023 23:08)  T(F): 98.8 (01 Apr 2023 11:17), Max: 99.3 (31 Mar 2023 23:08)  HR: 83 (01 Apr 2023 11:17) (83 - 104)  BP: 102/58 (01 Apr 2023 11:17) (99/71 - 139/68)  BP(mean): 75 (01 Apr 2023 11:17) (71 - 96)  RR: 18 (01 Apr 2023 11:17) (16 - 18)  SpO2: 100% (01 Apr 2023 11:17) (96% - 100%)    Parameters below as of 01 Apr 2023 11:17  Patient On (Oxygen Delivery Method): room air        PHYSICAL EXAM:  GENERAL: NAD   HEAD:  Atraumatic, Normocephalic  EYES: EOMI, PERRLA, conjunctiva and sclera clear  NECK: Supple, No JVD  CHEST/LUNG: Clear to auscultation bilaterally; No wheeze Incisions healing.   HEART: Regular rate and rhythm; No murmurs, rubs, or gallops  ABDOMEN: Soft, Nontender, Nondistended; Bowel sounds present  EXTREMITIES:  2+ Peripheral Pulses, No clubbing, cyanosis, or edema  PSYCH: AAOx3   NEUROLOGY: non-focal  SKIN: No rashes or lesions    LABS:                        7.6    10.91 )-----------( 238      ( 01 Apr 2023 05:14 )             24.5     04-01    139  |  103  |  11  ----------------------------<  77  4.2   |  25  |  0.54    Ca    8.8      01 Apr 2023 05:13  Phos  3.0     04-01  Mg     1.7     04-01    TPro  6.1  /  Alb  3.5  /  TBili  0.4  /  DBili  x   /  AST  17  /  ALT  17  /  AlkPhos  43  04-01                RADIOLOGY & ADDITIONAL TESTS:    Imaging Personally Reviewed:    Consultant(s) Notes Reviewed:      Care Discussed with Consultants/Other Providers:

## 2023-04-01 NOTE — PROGRESS NOTE ADULT - ASSESSMENT
46 yo F presenting to the ED yesterday with a HA over last couple days. During her evaluation she noted that she has had several episodes of intermittent left sided CP over the last couple days. States that it is exertional in nature and radiates to her left arm. States that she can walk about 1/2block before she starts to have symptoms. Prior to this she reports that she had a couple [place] lived [place] but her ECG's were normal so no further evaluation was done. She denies LE edema, orthopnea, PND. Her ECG was NSR without ischemic findings and her Trop T was <6. As a result she was ordered for a CTA coronaries which is concerning for a severe obstruction of the pLAD.  (18 Mar 2023 20:02)    On 3/28/23 s/p robotic MID CABG [facility]   Post op Course:  Postop 3/30 orthostatic; prbc given; levo started; echo neg pericardial effusion; levo d/c  3/31 tx sdu; RSR/ ST ; low dose bb started; lt ct d/c this am; ck f/u chest x-ray; d/c rij/ della/ michelle ; pain management  endo following for dm management; Lantus / ADMELOG adjusted as per endo.   4/1 VVS; Continue with current medication regimen.  Patient may transfer to the floor.  Likely D/C home in AM.   Discharge planning- home when stable

## 2023-04-01 NOTE — PROGRESS NOTE ADULT - SUBJECTIVE AND OBJECTIVE BOX
VITAL SIGNS    Subjective: "I'm feeling ok" Denies CP, palpitation, SOB, CHAUDHARY, HA, dizziness, N/V/D, fever or chills.  No acute event noted overnight.     Telemetry: NSR        Vital Signs Last 24 Hrs  T(C): 37.2 (23 @ 07:04), Max: 37.4 (23 @ 23:08)  T(F): 99 (23 @ 07:04), Max: 99.3 (23 @ 23:08)  HR: 89 (23 @ 07:04) (85 - 104)  BP: 102/57 (23 @ 07:04) (99/71 - 139/68)  RR: 18 (23 @ 07:04) (16 - 18)  SpO2: 99% (23 @ 07:04) (96% - 100%)            @ 07:01  -   @ 07:00  --------------------------------------------------------  IN: 720 mL / OUT: 1345 mL / NET: -625 mL     @ 07:01  -   @ 11:45  --------------------------------------------------------  IN: 320 mL / OUT: 250 mL / NET: 70 mL    LABS      139  |  103  |  11  ----------------------------<  77  4.2   |  25  |  0.54    Ca    8.8      2023 05:13  Phos  3.0     -  Mg     1.7         TPro  6.1  /  Alb  3.5  /  TBili  0.4  /  DBili  x   /  AST  17  /  ALT  17  /  AlkPhos  43                                   7.6    10.91 )-----------( 238      ( 2023 05:14 )             24.5          Daily     Daily Weight in k.6 (2023 06:14)      CAPILLARY BLOOD GLUCOSE      POCT Blood Glucose.: 159 mg/dL (2023 11:27)  POCT Blood Glucose.: 85 mg/dL (2023 07:29)  POCT Blood Glucose.: 216 mg/dL (31 Mar 2023 20:47)  POCT Blood Glucose.: 264 mg/dL (31 Mar 2023 17:15)          PHYSICAL EXAM    Neurology: alert and oriented x 3, nonfocal, no gross deficits    CV: (+) S1 and S2, No murmurs, rubs, gallops or clicks     Sternal Wound: MSI -->CDI, sternum stable    Lungs: CTA B/L     Abdomen: soft, nontender, nondistended, positive bowel sounds, (+) Flatus; (+) BM     :  Voiding               Extremities:  B/L LE (+)1  edema; negative calf tenderness; (+) 2 DP palpable        acetaminophen     Tablet .. 650 milliGRAM(s) Oral every 6 hours PRN  ascorbic acid 500 milliGRAM(s) Oral two times a day  aspirin enteric coated 81 milliGRAM(s) Oral daily  atorvastatin 80 milliGRAM(s) Oral at bedtime  bisacodyl Suppository 10 milliGRAM(s) Rectal once  chlorhexidine 2% Cloths 1 Application(s) Topical daily  clopidogrel Tablet 75 milliGRAM(s) Oral daily  enoxaparin Injectable 40 milliGRAM(s) SubCutaneous every 24 hours  gabapentin 100 milliGRAM(s) Oral every 8 hours  insulin glargine Injectable (LANTUS) 32 Unit(s) SubCutaneous at bedtime  insulin lispro (ADMELOG) corrective regimen sliding scale SubCutaneous three times a day before meals  insulin lispro (ADMELOG) corrective regimen sliding scale SubCutaneous at bedtime  insulin lispro Injectable (ADMELOG) 7 Unit(s) SubCutaneous three times a day before meals  lidocaine   4% Patch 1 Patch Transdermal daily  melatonin 3 milliGRAM(s) Oral at bedtime  metoprolol tartrate 12.5 milliGRAM(s) Oral two times a day  ondansetron Injectable 4 milliGRAM(s) IV Push once  oxyCODONE IR 5 milliGRAM(s) Oral every 4 hours PRN  oxyCODONE IR 10 milliGRAM(s) Oral every 4 hours PRN  pantoprazole Tablet 40 milliGRAM(s) Oral before breakfast  polyethylene glycol 3350 17 Gram(s) Oral daily  senna 2 Tablet(s) Oral at bedtime    Physical Therapy Rec:   Home  [ X ]   Home w/ PT  [  ]  Rehab  [  ]    Discussed with Cardiothoracic Team at AM rounds.

## 2023-04-01 NOTE — PROGRESS NOTE ADULT - ASSESSMENT
Assessment  DMT2: 47y Female with DM T2 with hyperglycemia, A1C 8.3% , was on oral meds at home, s/p surgery, transitioned off insulin drip now on basal bolus  insulin with coverage, glucose now down tredning, insulin adjusted, now eating meals and transferred to step down.   CAD: on medications, stable, monitored. s/p mid CABG, CTS global care.   ?Hypothyroidism: Not on supplemental synthroid. TSH mildly elevated 4.37, now normal TSH and normal FT4 . Euthyroid.   HTN: on antihypertensive medications, monitored, asymptomatic.        Discussed plan and management wit Dr Jimi Krause MD  Cell: [phone]  Office: [phone]               Assessment  DMT2: 47y Female with DM T2 with hyperglycemia, A1C 8.3% , was on oral meds at home, s/p surgery, transitioned off insulin drip now on basal bolus  insulin with coverage, glucose now down tredning, insulin adjusted, now eating meals  and transferred to step down.   CAD: on medications, stable, monitored. s/p mid CABG, CTS global care.   ?Hypothyroidism: Not on supplemental synthroid. TSH mildly elevated 4.37, now normal TSH and normal FT4 . Euthyroid.   HTN: on antihypertensive medications, monitored, asymptomatic.        Discussed plan and management wit Dr Jimi Krause MD  Cell: [phone]  Office: [phone]

## 2023-04-01 NOTE — PROGRESS NOTE ADULT - ASSESSMENT
47 f with    CAD/ Chest pain/ Abnormal CT coronaries  / s/p Robotic CABG  - telemetry  - ASA  - BB  - Cardiology follow    Diabetes Mellitus  - BS control  - ADA diet     DVT prophylaxis  - low risk     PT    DCP home.     Jan Mitchell MD phone [phone]

## 2023-04-01 NOTE — PROGRESS NOTE ADULT - SUBJECTIVE AND OBJECTIVE BOX
Chief complaint  Patient is a 47y old  Female who presents with a chief complaint of CAD (01 Apr 2023 11:44)         Labs and Fingersticks  CAPILLARY BLOOD GLUCOSE      POCT Blood Glucose.: 159 mg/dL (01 Apr 2023 11:27)  POCT Blood Glucose.: 85 mg/dL (01 Apr 2023 07:29)  POCT Blood Glucose.: 216 mg/dL (31 Mar 2023 20:47)  POCT Blood Glucose.: 264 mg/dL (31 Mar 2023 17:15)      Anion Gap, Serum: 11 (04-01 @ 05:13)      Calcium, Total Serum: 8.8 (04-01 @ 05:13)  Calcium, Total Serum: 8.7 (03-31 @ 01:02)  Albumin, Serum: 3.5 (04-01 @ 05:13)  Albumin, Serum: 3.5 (03-31 @ 01:02)    Alanine Aminotransferase (ALT/SGPT): 17 (04-01 @ 05:13)  Alanine Aminotransferase (ALT/SGPT): 14 (03-31 @ 01:02)  Alkaline Phosphatase, Serum: 43 (04-01 @ 05:13)  Alkaline Phosphatase, Serum: 45 (03-31 @ 01:02)  Aspartate Aminotransferase (AST/SGOT): 17 (04-01 @ 05:13)  Aspartate Aminotransferase (AST/SGOT): 15 (03-31 @ 01:02)        04-01    139  |  103  |  11  ----------------------------<  77  4.2   |  25  |  0.54    Ca    8.8      01 Apr 2023 05:13  Phos  3.0     04-01  Mg     1.7     04-01    TPro  6.1  /  Alb  3.5  /  TBili  0.4  /  DBili  x   /  AST  17  /  ALT  17  /  AlkPhos  43  04-01                        7.6    10.91 )-----------( 238      ( 01 Apr 2023 05:14 )             24.5     Medications  MEDICATIONS  (STANDING):  ascorbic acid 500 milliGRAM(s) Oral two times a day  aspirin enteric coated 81 milliGRAM(s) Oral daily  atorvastatin 80 milliGRAM(s) Oral at bedtime  bisacodyl Suppository 10 milliGRAM(s) Rectal once  chlorhexidine 2% Cloths 1 Application(s) Topical daily  clopidogrel Tablet 75 milliGRAM(s) Oral daily  enoxaparin Injectable 40 milliGRAM(s) SubCutaneous every 24 hours  gabapentin 100 milliGRAM(s) Oral every 8 hours  insulin glargine Injectable (LANTUS) 32 Unit(s) SubCutaneous at bedtime  insulin lispro (ADMELOG) corrective regimen sliding scale   SubCutaneous three times a day before meals  insulin lispro (ADMELOG) corrective regimen sliding scale   SubCutaneous at bedtime  insulin lispro Injectable (ADMELOG) 7 Unit(s) SubCutaneous three times a day before meals  lidocaine   4% Patch 1 Patch Transdermal daily  melatonin 3 milliGRAM(s) Oral at bedtime  metoprolol tartrate 12.5 milliGRAM(s) Oral two times a day  ondansetron Injectable 4 milliGRAM(s) IV Push once  pantoprazole    Tablet 40 milliGRAM(s) Oral before breakfast  polyethylene glycol 3350 17 Gram(s) Oral daily  senna 2 Tablet(s) Oral at bedtime      Physical Exam  General: Patient comfortable in chair  Vital Signs Last 12 Hrs  T(F): 98.8 (04-01-23 @ 11:17), Max: 99 (04-01-23 @ 07:04)  HR: 83 (04-01-23 @ 11:17) (83 - 99)  BP: 102/58 (04-01-23 @ 11:17) (99/71 - 102/58)  BP(mean): 75 (04-01-23 @ 11:17) (71 - 77)  RR: 18 (04-01-23 @ 11:17) (18 - 18)  SpO2: 100% (04-01-23 @ 11:17) (96% - 100%)    CVS: S1S2   Respiratory: No wheezing, no crepitations  GI: Abdomen soft, bowel sounds positive  Musculoskeletal:  moves all extremities  : Voiding          Chief complaint  Patient is a 47y old  Female who presents with a chief complaint of CAD (01 Apr 2023 11:44)     Labs and Fingersticks  CAPILLARY BLOOD GLUCOSE      POCT Blood Glucose.: 159 mg/dL (01 Apr 2023 11:27)  POCT Blood Glucose.: 85 mg/dL (01 Apr 2023 07:29)  POCT Blood Glucose.: 216 mg/dL (31 Mar 2023 20:47)  POCT Blood Glucose.: 264 mg/dL (31 Mar 2023 17:15)      Anion Gap, Serum: 11 (04-01 @ 05:13)      Calcium, Total Serum: 8.8 (04-01 @ 05:13)  Calcium, Total Serum: 8.7 (03-31 @ 01:02)  Albumin, Serum: 3.5 (04-01 @ 05:13)  Albumin, Serum: 3.5 (03-31 @ 01:02)    Alanine Aminotransferase (ALT/SGPT): 17 (04-01 @ 05:13)  Alanine Aminotransferase (ALT/SGPT): 14 (03-31 @ 01:02)  Alkaline Phosphatase, Serum: 43 (04-01 @ 05:13)  Alkaline Phosphatase, Serum: 45 (03-31 @ 01:02)  Aspartate Aminotransferase (AST/SGOT): 17 (04-01 @ 05:13)  Aspartate Aminotransferase (AST/SGOT): 15 (03-31 @ 01:02)        04-01    139  |  103  |  11  ----------------------------<  77  4.2   |  25  |  0.54    Ca    8.8      01 Apr 2023 05:13  Phos  3.0     04-01  Mg     1.7     04-01    TPro  6.1  /  Alb  3.5  /  TBili  0.4  /  DBili  x   /  AST  17  /  ALT  17  /  AlkPhos  43  04-01                        7.6    10.91 )-----------( 238      ( 01 Apr 2023 05:14 )             24.5     Medications  MEDICATIONS  (STANDING):  ascorbic acid 500 milliGRAM(s) Oral two times a day  aspirin enteric coated 81 milliGRAM(s) Oral daily  atorvastatin 80 milliGRAM(s) Oral at bedtime  bisacodyl Suppository 10 milliGRAM(s) Rectal once  chlorhexidine 2% Cloths 1 Application(s) Topical daily  clopidogrel Tablet 75 milliGRAM(s) Oral daily  enoxaparin Injectable 40 milliGRAM(s) SubCutaneous every 24 hours  gabapentin 100 milliGRAM(s) Oral every 8 hours  insulin glargine Injectable (LANTUS) 32 Unit(s) SubCutaneous at bedtime  insulin lispro (ADMELOG) corrective regimen sliding scale   SubCutaneous three times a day before meals  insulin lispro (ADMELOG) corrective regimen sliding scale   SubCutaneous at bedtime  insulin lispro Injectable (ADMELOG) 7 Unit(s) SubCutaneous three times a day before meals  lidocaine   4% Patch 1 Patch Transdermal daily  melatonin 3 milliGRAM(s) Oral at bedtime  metoprolol tartrate 12.5 milliGRAM(s) Oral two times a day  ondansetron Injectable 4 milliGRAM(s) IV Push once  pantoprazole    Tablet 40 milliGRAM(s) Oral before breakfast  polyethylene glycol 3350 17 Gram(s) Oral daily  senna 2 Tablet(s) Oral at bedtime      Physical Exam  General: Patient comfortable in chair  Vital Signs Last 12 Hrs  T(F): 98.8 (04-01-23 @ 11:17), Max: 99 (04-01-23 @ 07:04)  HR: 83 (04-01-23 @ 11:17) (83 - 99)  BP: 102/58 (04-01-23 @ 11:17) (99/71 - 102/58)  BP(mean): 75 (04-01-23 @ 11:17) (71 - 77)  RR: 18 (04-01-23 @ 11:17) (18 - 18)  SpO2: 100% (04-01-23 @ 11:17) (96% - 100%)    CVS: S1S2   Respiratory: No wheezing, no crepitations  GI: Abdomen soft, bowel sounds positive  Musculoskeletal:  moves all extremities  : Voiding

## 2023-04-02 ENCOUNTER — TRANSCRIPTION ENCOUNTER (OUTPATIENT)
Age: 48
End: 2023-04-02

## 2023-04-02 VITALS
OXYGEN SATURATION: 99 % | HEART RATE: 84 BPM | DIASTOLIC BLOOD PRESSURE: 67 MMHG | RESPIRATION RATE: 18 BRPM | TEMPERATURE: 98 F | SYSTOLIC BLOOD PRESSURE: 105 MMHG

## 2023-04-02 LAB
ALBUMIN SERPL ELPH-MCNC: 4 G/DL — SIGNIFICANT CHANGE UP (ref 3.3–5)
ALP SERPL-CCNC: 52 U/L — SIGNIFICANT CHANGE UP (ref 40–120)
ALT FLD-CCNC: 27 U/L — SIGNIFICANT CHANGE UP (ref 10–45)
ANION GAP SERPL CALC-SCNC: 10 MMOL/L — SIGNIFICANT CHANGE UP (ref 5–17)
AST SERPL-CCNC: 22 U/L — SIGNIFICANT CHANGE UP (ref 10–40)
BILIRUB SERPL-MCNC: 0.8 MG/DL — SIGNIFICANT CHANGE UP (ref 0.2–1.2)
BUN SERPL-MCNC: 12 MG/DL — SIGNIFICANT CHANGE UP (ref 7–23)
CALCIUM SERPL-MCNC: 9.6 MG/DL — SIGNIFICANT CHANGE UP (ref 8.4–10.5)
CHLORIDE SERPL-SCNC: 101 MMOL/L — SIGNIFICANT CHANGE UP (ref 96–108)
CO2 SERPL-SCNC: 26 MMOL/L — SIGNIFICANT CHANGE UP (ref 22–31)
CREAT SERPL-MCNC: 0.51 MG/DL — SIGNIFICANT CHANGE UP (ref 0.5–1.3)
EGFR: 117 ML/MIN/1.73M2 — SIGNIFICANT CHANGE UP
GLUCOSE BLDC GLUCOMTR-MCNC: 127 MG/DL — HIGH (ref 70–99)
GLUCOSE BLDC GLUCOMTR-MCNC: 162 MG/DL — HIGH (ref 70–99)
GLUCOSE SERPL-MCNC: 155 MG/DL — HIGH (ref 70–99)
HCT VFR BLD CALC: 27.1 % — LOW (ref 34.5–45)
HGB BLD-MCNC: 8.5 G/DL — LOW (ref 11.5–15.5)
MAGNESIUM SERPL-MCNC: 1.7 MG/DL — SIGNIFICANT CHANGE UP (ref 1.6–2.6)
MCHC RBC-ENTMCNC: 27.6 PG — SIGNIFICANT CHANGE UP (ref 27–34)
MCHC RBC-ENTMCNC: 31.4 GM/DL — LOW (ref 32–36)
MCV RBC AUTO: 88 FL — SIGNIFICANT CHANGE UP (ref 80–100)
NRBC # BLD: 0 /100 WBCS — SIGNIFICANT CHANGE UP (ref 0–0)
PHOSPHATE SERPL-MCNC: 4.2 MG/DL — SIGNIFICANT CHANGE UP (ref 2.5–4.5)
PLATELET # BLD AUTO: 274 K/UL — SIGNIFICANT CHANGE UP (ref 150–400)
POTASSIUM SERPL-MCNC: 4.4 MMOL/L — SIGNIFICANT CHANGE UP (ref 3.5–5.3)
POTASSIUM SERPL-SCNC: 4.4 MMOL/L — SIGNIFICANT CHANGE UP (ref 3.5–5.3)
PROT SERPL-MCNC: 6.8 G/DL — SIGNIFICANT CHANGE UP (ref 6–8.3)
RBC # BLD: 3.08 M/UL — LOW (ref 3.8–5.2)
RBC # FLD: 13.8 % — SIGNIFICANT CHANGE UP (ref 10.3–14.5)
SODIUM SERPL-SCNC: 137 MMOL/L — SIGNIFICANT CHANGE UP (ref 135–145)
WBC # BLD: 9.64 K/UL — SIGNIFICANT CHANGE UP (ref 3.8–10.5)
WBC # FLD AUTO: 9.64 K/UL — SIGNIFICANT CHANGE UP (ref 3.8–10.5)

## 2023-04-02 PROCEDURE — P9045: CPT

## 2023-04-02 PROCEDURE — 86923 COMPATIBILITY TEST ELECTRIC: CPT

## 2023-04-02 PROCEDURE — 83735 ASSAY OF MAGNESIUM: CPT

## 2023-04-02 PROCEDURE — 85014 HEMATOCRIT: CPT

## 2023-04-02 PROCEDURE — 82553 CREATINE MB FRACTION: CPT

## 2023-04-02 PROCEDURE — 87641 MR-STAPH DNA AMP PROBE: CPT

## 2023-04-02 PROCEDURE — 99285 EMERGENCY DEPT VISIT HI MDM: CPT | Mod: 25

## 2023-04-02 PROCEDURE — 85520 HEPARIN ASSAY: CPT

## 2023-04-02 PROCEDURE — 97116 GAIT TRAINING THERAPY: CPT

## 2023-04-02 PROCEDURE — 87640 STAPH A DNA AMP PROBE: CPT

## 2023-04-02 PROCEDURE — 84702 CHORIONIC GONADOTROPIN TEST: CPT

## 2023-04-02 PROCEDURE — 85384 FIBRINOGEN ACTIVITY: CPT

## 2023-04-02 PROCEDURE — 86900 BLOOD TYPING SEROLOGIC ABO: CPT

## 2023-04-02 PROCEDURE — S2900: CPT

## 2023-04-02 PROCEDURE — 36430 TRANSFUSION BLD/BLD COMPNT: CPT

## 2023-04-02 PROCEDURE — U0005: CPT

## 2023-04-02 PROCEDURE — 86850 RBC ANTIBODY SCREEN: CPT

## 2023-04-02 PROCEDURE — 71045 X-RAY EXAM CHEST 1 VIEW: CPT

## 2023-04-02 PROCEDURE — 82803 BLOOD GASES ANY COMBINATION: CPT

## 2023-04-02 PROCEDURE — 85730 THROMBOPLASTIN TIME PARTIAL: CPT

## 2023-04-02 PROCEDURE — 80053 COMPREHEN METABOLIC PANEL: CPT

## 2023-04-02 PROCEDURE — 84484 ASSAY OF TROPONIN QUANT: CPT

## 2023-04-02 PROCEDURE — 82947 ASSAY GLUCOSE BLOOD QUANT: CPT

## 2023-04-02 PROCEDURE — 84481 FREE ASSAY (FT-3): CPT

## 2023-04-02 PROCEDURE — 80061 LIPID PANEL: CPT

## 2023-04-02 PROCEDURE — 80048 BASIC METABOLIC PNL TOTAL CA: CPT

## 2023-04-02 PROCEDURE — 93880 EXTRACRANIAL BILAT STUDY: CPT

## 2023-04-02 PROCEDURE — 97165 OT EVAL LOW COMPLEX 30 MIN: CPT

## 2023-04-02 PROCEDURE — 83605 ASSAY OF LACTIC ACID: CPT

## 2023-04-02 PROCEDURE — 81001 URINALYSIS AUTO W/SCOPE: CPT

## 2023-04-02 PROCEDURE — 94010 BREATHING CAPACITY TEST: CPT

## 2023-04-02 PROCEDURE — C1894: CPT

## 2023-04-02 PROCEDURE — 70486 CT MAXILLOFACIAL W/O DYE: CPT | Mod: MA

## 2023-04-02 PROCEDURE — 82565 ASSAY OF CREATININE: CPT

## 2023-04-02 PROCEDURE — 0225U NFCT DS DNA&RNA 21 SARSCOV2: CPT

## 2023-04-02 PROCEDURE — 84100 ASSAY OF PHOSPHORUS: CPT

## 2023-04-02 PROCEDURE — 86901 BLOOD TYPING SEROLOGIC RH(D): CPT

## 2023-04-02 PROCEDURE — 84439 ASSAY OF FREE THYROXINE: CPT

## 2023-04-02 PROCEDURE — C1769: CPT

## 2023-04-02 PROCEDURE — 84436 ASSAY OF TOTAL THYROXINE: CPT

## 2023-04-02 PROCEDURE — 82550 ASSAY OF CK (CPK): CPT

## 2023-04-02 PROCEDURE — 85576 BLOOD PLATELET AGGREGATION: CPT

## 2023-04-02 PROCEDURE — 70450 CT HEAD/BRAIN W/O DYE: CPT | Mod: MA

## 2023-04-02 PROCEDURE — 93356 MYOCRD STRAIN IMG SPCKL TRCK: CPT

## 2023-04-02 PROCEDURE — 96374 THER/PROPH/DIAG INJ IV PUSH: CPT

## 2023-04-02 PROCEDURE — 83690 ASSAY OF LIPASE: CPT

## 2023-04-02 PROCEDURE — 85610 PROTHROMBIN TIME: CPT

## 2023-04-02 PROCEDURE — C9399: CPT

## 2023-04-02 PROCEDURE — 36415 COLL VENOUS BLD VENIPUNCTURE: CPT

## 2023-04-02 PROCEDURE — 85379 FIBRIN DEGRADATION QUANT: CPT

## 2023-04-02 PROCEDURE — 86891 AUTOLOGOUS BLOOD OP SALVAGE: CPT

## 2023-04-02 PROCEDURE — C1750: CPT

## 2023-04-02 PROCEDURE — C1887: CPT

## 2023-04-02 PROCEDURE — 75574 CT ANGIO HRT W/3D IMAGE: CPT | Mod: MG

## 2023-04-02 PROCEDURE — 71046 X-RAY EXAM CHEST 2 VIEWS: CPT

## 2023-04-02 PROCEDURE — 97163 PT EVAL HIGH COMPLEX 45 MIN: CPT

## 2023-04-02 PROCEDURE — 84295 ASSAY OF SERUM SODIUM: CPT

## 2023-04-02 PROCEDURE — 81025 URINE PREGNANCY TEST: CPT

## 2023-04-02 PROCEDURE — 84480 ASSAY TRIIODOTHYRONINE (T3): CPT

## 2023-04-02 PROCEDURE — 83880 ASSAY OF NATRIURETIC PEPTIDE: CPT

## 2023-04-02 PROCEDURE — 85018 HEMOGLOBIN: CPT

## 2023-04-02 PROCEDURE — 93308 TTE F-UP OR LMTD: CPT

## 2023-04-02 PROCEDURE — 85027 COMPLETE CBC AUTOMATED: CPT

## 2023-04-02 PROCEDURE — 93306 TTE W/DOPPLER COMPLETE: CPT

## 2023-04-02 PROCEDURE — 84132 ASSAY OF SERUM POTASSIUM: CPT

## 2023-04-02 PROCEDURE — 93005 ELECTROCARDIOGRAM TRACING: CPT

## 2023-04-02 PROCEDURE — 82330 ASSAY OF CALCIUM: CPT

## 2023-04-02 PROCEDURE — 82435 ASSAY OF BLOOD CHLORIDE: CPT

## 2023-04-02 PROCEDURE — G1004: CPT

## 2023-04-02 PROCEDURE — 93458 L HRT ARTERY/VENTRICLE ANGIO: CPT

## 2023-04-02 PROCEDURE — P9016: CPT

## 2023-04-02 PROCEDURE — U0003: CPT

## 2023-04-02 PROCEDURE — C1889: CPT

## 2023-04-02 PROCEDURE — 85025 COMPLETE CBC W/AUTO DIFF WBC: CPT

## 2023-04-02 PROCEDURE — 82962 GLUCOSE BLOOD TEST: CPT

## 2023-04-02 PROCEDURE — 84443 ASSAY THYROID STIM HORMONE: CPT

## 2023-04-02 PROCEDURE — 83036 HEMOGLOBIN GLYCOSYLATED A1C: CPT

## 2023-04-02 RX ORDER — OXYCODONE HYDROCHLORIDE 5 MG/1
1 TABLET ORAL
Qty: 20 | Refills: 0
Start: 2023-04-02 | End: 2023-04-06

## 2023-04-02 RX ORDER — METFORMIN HYDROCHLORIDE 850 MG/1
1 TABLET ORAL
Qty: 0 | Refills: 0 | DISCHARGE

## 2023-04-02 RX ORDER — ASPIRIN/CALCIUM CARB/MAGNESIUM 324 MG
1 TABLET ORAL
Qty: 30 | Refills: 0
Start: 2023-04-02 | End: 2023-05-01

## 2023-04-02 RX ORDER — LIDOCAINE 4 G/100G
1 CREAM TOPICAL
Qty: 7 | Refills: 0
Start: 2023-04-02 | End: 2023-04-08

## 2023-04-02 RX ORDER — PANTOPRAZOLE SODIUM 20 MG/1
1 TABLET, DELAYED RELEASE ORAL
Qty: 30 | Refills: 0
Start: 2023-04-02 | End: 2023-05-01

## 2023-04-02 RX ORDER — ACETAMINOPHEN 500 MG
2 TABLET ORAL
Qty: 0 | Refills: 0 | DISCHARGE
Start: 2023-04-02

## 2023-04-02 RX ORDER — INSULIN GLARGINE 100 [IU]/ML
32 INJECTION, SOLUTION SUBCUTANEOUS
Qty: 1 | Refills: 0
Start: 2023-04-02 | End: 2023-05-01

## 2023-04-02 RX ORDER — LANOLIN ALCOHOL/MO/W.PET/CERES
1 CREAM (GRAM) TOPICAL
Qty: 0 | Refills: 0 | DISCHARGE

## 2023-04-02 RX ORDER — CLOPIDOGREL BISULFATE 75 MG/1
1 TABLET, FILM COATED ORAL
Qty: 30 | Refills: 0
Start: 2023-04-02 | End: 2023-05-01

## 2023-04-02 RX ORDER — SENNA PLUS 8.6 MG/1
2 TABLET ORAL
Qty: 60 | Refills: 0
Start: 2023-04-02 | End: 2023-05-01

## 2023-04-02 RX ORDER — INSULIN LISPRO 100/ML
7 VIAL (ML) SUBCUTANEOUS
Qty: 3 | Refills: 0
Start: 2023-04-02 | End: 2023-05-01

## 2023-04-02 RX ORDER — METOPROLOL TARTRATE 50 MG
1 TABLET ORAL
Qty: 60 | Refills: 0
Start: 2023-04-02 | End: 2023-05-01

## 2023-04-02 RX ORDER — POLYETHYLENE GLYCOL 3350 17 G/17G
17 POWDER, FOR SOLUTION ORAL
Qty: 510 | Refills: 0
Start: 2023-04-02 | End: 2023-05-01

## 2023-04-02 RX ORDER — ATORVASTATIN CALCIUM 80 MG/1
1 TABLET, FILM COATED ORAL
Qty: 30 | Refills: 0
Start: 2023-04-02

## 2023-04-02 RX ORDER — LANOLIN ALCOHOL/MO/W.PET/CERES
1 CREAM (GRAM) TOPICAL
Qty: 0 | Refills: 0 | DISCHARGE
Start: 2023-04-02

## 2023-04-02 RX ADMIN — Medication 7 UNIT(S): at 08:05

## 2023-04-02 RX ADMIN — PANTOPRAZOLE SODIUM 40 MILLIGRAM(S): 20 TABLET, DELAYED RELEASE ORAL at 06:20

## 2023-04-02 RX ADMIN — Medication 7 UNIT(S): at 11:55

## 2023-04-02 RX ADMIN — Medication 500 MILLIGRAM(S): at 05:09

## 2023-04-02 RX ADMIN — Medication 81 MILLIGRAM(S): at 11:56

## 2023-04-02 RX ADMIN — GABAPENTIN 100 MILLIGRAM(S): 400 CAPSULE ORAL at 05:09

## 2023-04-02 RX ADMIN — Medication 1: at 08:05

## 2023-04-02 RX ADMIN — CLOPIDOGREL BISULFATE 75 MILLIGRAM(S): 75 TABLET, FILM COATED ORAL at 11:56

## 2023-04-02 RX ADMIN — Medication 25 MILLIGRAM(S): at 05:09

## 2023-04-02 NOTE — PROGRESS NOTE ADULT - PROVIDER SPECIALTY LIST ADULT
Anesthesia
Anesthesia
CT Surgery
CT Surgery
Cardiology
Cardiology
Critical Care
Internal Medicine
Anesthesia
Cardiology
Critical Care
Critical Care
Endocrinology
Internal Medicine
Cardiology
Internal Medicine
Cardiology
Endocrinology
CT Surgery
Endocrinology
Endocrinology

## 2023-04-02 NOTE — PROGRESS NOTE ADULT - ASSESSMENT
47 f with    CAD/ Chest pain/ Abnormal CT coronaries  / s/p Robotic CABG  - ASA  - BB  - Cardiology follow    Diabetes Mellitus  - BS control  - ADA diet     DVT prophylaxis  - low risk     PT    DCP home in progress     Jan Mitchell MD phone [phone]

## 2023-04-02 NOTE — DISCHARGE NOTE PROVIDER - HOSPITAL COURSE
46 yo F presenting to the ED yesterday with a HA over last couple days. During her evaluation she noted that she has had several episodes of intermittent left sided CP over the last couple days. States that it is exertional in nature and radiates to her left arm. States that she can walk about 1/2block before she starts to have symptoms. Prior to this she reports that she had a couple [place] lived [place] but [place] ECG's were normal so no further evaluation was done. She denies LE edema, orthopnea, PND. Her ECG was NSR without ischemic findings and her Trop T was <6. As a result she was ordered for a CTA coronaries which is concerning for a severe obstruction of the pLAD.  (18 Mar 2023 20:02)    On 3/28/23 s/p robotic MID CABG LIMA   Post op Course:  Postop 3/30 orthostatic; prbc given; levo started; echo neg pericardial effusion; levo d/c  3/31 tx sdu; RSR/ ST ; low dose bb started; lt ct d/c this am; ck f/u chest x-ray; d/c rij/ della/ michelle ; pain management  endo following for dm management; Lantus / ADMELOG adjusted as per endo.   4/1 VVS; Continue with current medication regimen.  Patient may transfer to the floor.  Likely D/C home in AM.   Discharge planning- home when stable   4/2 D/C to home VSS on Lantus/ademlog for DM teaching done. s/p Robotic MIDCAD CABG LIMA

## 2023-04-02 NOTE — PROGRESS NOTE ADULT - PROBLEM SELECTOR PROBLEM 1
DM (diabetes mellitus)
DM (diabetes mellitus)
CAD (coronary artery disease)
DM (diabetes mellitus)

## 2023-04-02 NOTE — DISCHARGE NOTE PROVIDER - NSDCCPCAREPLAN_GEN_ALL_CORE_FT
PRINCIPAL DISCHARGE DIAGNOSIS  Diagnosis: Headache, migraine  Assessment and Plan of Treatment:       SECONDARY DISCHARGE DIAGNOSES  Diagnosis: Chest pain  Assessment and Plan of Treatment:

## 2023-04-02 NOTE — PROGRESS NOTE ADULT - SUBJECTIVE AND OBJECTIVE BOX
Patient is a 47y old  Female who presents with a chief complaint of cardiac surgery (02 Apr 2023 13:42)      SUBJECTIVE / OVERNIGHT EVENTS: Comfortable without new complaints.   Review of Systems  chest pain no  palpitations no  sob no  nausea no  headache no    MEDICATIONS  (STANDING):  aspirin enteric coated 81 milliGRAM(s) Oral daily  atorvastatin 80 milliGRAM(s) Oral at bedtime  bisacodyl Suppository 10 milliGRAM(s) Rectal once  chlorhexidine 2% Cloths 1 Application(s) Topical daily  clopidogrel Tablet 75 milliGRAM(s) Oral daily  enoxaparin Injectable 40 milliGRAM(s) SubCutaneous every 24 hours  insulin glargine Injectable (LANTUS) 32 Unit(s) SubCutaneous at bedtime  insulin lispro (ADMELOG) corrective regimen sliding scale   SubCutaneous three times a day before meals  insulin lispro (ADMELOG) corrective regimen sliding scale   SubCutaneous at bedtime  insulin lispro Injectable (ADMELOG) 7 Unit(s) SubCutaneous three times a day before meals  lidocaine   4% Patch 1 Patch Transdermal daily  melatonin 3 milliGRAM(s) Oral at bedtime  metoprolol tartrate 25 milliGRAM(s) Oral two times a day  ondansetron Injectable 4 milliGRAM(s) IV Push once  pantoprazole    Tablet 40 milliGRAM(s) Oral before breakfast  polyethylene glycol 3350 17 Gram(s) Oral daily  senna 2 Tablet(s) Oral at bedtime    MEDICATIONS  (PRN):  acetaminophen     Tablet .. 650 milliGRAM(s) Oral every 6 hours PRN Mild Pain (1 - 3)  oxyCODONE    IR 5 milliGRAM(s) Oral every 4 hours PRN Moderate Pain (4 - 6)  oxyCODONE    IR 10 milliGRAM(s) Oral every 4 hours PRN Severe Pain (7 - 10)      Vital Signs Last 24 Hrs  T(C): 36.7 (02 Apr 2023 04:00), Max: 37.1 (01 Apr 2023 20:03)  T(F): 98.1 (02 Apr 2023 04:00), Max: 98.8 (01 Apr 2023 20:03)  HR: 84 (02 Apr 2023 04:00) (76 - 92)  BP: 105/67 (02 Apr 2023 04:00) (105/67 - 116/74)  BP(mean): 79 (02 Apr 2023 04:00) (79 - 79)  RR: 18 (02 Apr 2023 04:00) (16 - 18)  SpO2: 99% (02 Apr 2023 04:00) (98% - 100%)    Parameters below as of 02 Apr 2023 04:00  Patient On (Oxygen Delivery Method): room air        PHYSICAL EXAM:  GENERAL: NAD, well-developed  HEAD:  Atraumatic, Normocephalic  EYES: EOMI, PERRLA, conjunctiva and sclera clear  NECK: Supple, No JVD  CHEST/LUNG: Clear to auscultation bilaterally; No wheeze Incisions healing.   HEART: Regular rate and rhythm; No murmurs, rubs, or gallops  ABDOMEN: Soft, Nontender, Nondistended; Bowel sounds present  EXTREMITIES:  2+ Peripheral Pulses, No clubbing, cyanosis, or edema  PSYCH: AAOx3  NEUROLOGY: non-focal  SKIN: No rashes or lesions    LABS:                        8.5    9.64  )-----------( 274      ( 02 Apr 2023 05:56 )             27.1     04-02    137  |  101  |  12  ----------------------------<  155<H>  4.4   |  26  |  0.51    Ca    9.6      02 Apr 2023 05:55  Phos  4.2     04-02  Mg     1.7     04-02    TPro  6.8  /  Alb  4.0  /  TBili  0.8  /  DBili  x   /  AST  22  /  ALT  27  /  AlkPhos  52  04-02                RADIOLOGY & ADDITIONAL TESTS:    Imaging Personally Reviewed:    Consultant(s) Notes Reviewed:      Care Discussed with Consultants/Other Providers:

## 2023-04-02 NOTE — DISCHARGE NOTE PROVIDER - NSDCACTIVITY_GEN_ALL_CORE
Sex allowed/Showering allowed/Do not make important decisions/Walking - Indoors allowed/No heavy lifting/straining/Walking - Outdoors allowed/Follow Instructions Provided by your Surgical Team

## 2023-04-02 NOTE — PROGRESS NOTE ADULT - REASON FOR ADMISSION
CAD
CP
chest pain
CP
CP
cardiac surgery
cardiac surgery
preop cabg
CAD
cardiac surgery
cardiac surgery
CAD

## 2023-04-02 NOTE — PROGRESS NOTE ADULT - PROBLEM SELECTOR PLAN 2
Endo following   need tight glycemic control   carb consistent diet
On medications,  no chest pain, stable, monitored and followed up by primary cardiothoracic team/cardiology team.  s/p cabg
continue postop care  s/p 3/28/23 robotic midcabg  continue asa and plavix  continue statin  start low dose bb lop 12.5 bid  d/c lt ct/ ck f/u chest xray  pulm toilet  pain management  bowel regimen  increase activity as tolerated  d/c martinez this am- pt dtv   discharge planning- home when stable
continue postop care  s/p 3/28/23 robotic midcabg  continue asa and plavix  continue statin  Continue on dose bb lop 12.5 bid  pulm toilet  pain management  bowel regimen  increase activity as tolerated  discharge planning- home in AM   Patient may transfer to the floor.

## 2023-04-02 NOTE — DISCHARGE NOTE NURSING/CASE MANAGEMENT/SOCIAL WORK - PATIENT PORTAL LINK FT
You can access the FollowMyHealth Patient Portal offered by [facility] by registering at the following website: [email or web address]. By joining [employer]’s FollowMyHealth portal, you will also be able to view your health information using other applications (apps) compatible with our system.

## 2023-04-02 NOTE — DISCHARGE NOTE PROVIDER - CARE PROVIDER_API CALL
Polo Nix)  Surgery; Thoracic Surgery  [address] [place] [address]  Phone: [phone]  Fax: [phone]  Follow Up Time:

## 2023-04-02 NOTE — PROGRESS NOTE ADULT - PROBLEM SELECTOR PLAN 1
Preop CABG OR DATE 3/28   Continue with ASA, Statin, BB.   NPO after mn   Pending t&s   Covid negative within 5 days
Will continue Lantus 40 units at bed time.  Will increase Admelog 10  units before each meal in addition to Admelog correction scale coverage.  Will continue monitoring FS, log, and glucose trends, will Follow up.  Patient counseled for compliance with consistent low carb diet and exercise as tolerated outpatient.    Pt agreeable to home insulin if needed, will continue to monitor glucose trends and insulin requirements.
Will continue Lantus to 32 units at bed time.  Will continue Admelog to 7 units before each meal in addition to Admelog correction scale coverage.  Will continue monitoring FS, log, and glucose trends, will Follow up.  Patient counseled for compliance with consistent low carb diet and exercise as tolerated outpatient.    Pt agreeable to home insulin if needed, will continue to monitor glucose trends and insulin requirements.
Will increase Lantus to 40 units at bed time.  Will increase Admelog to 9 units before each meal in addition to Admelog correction scale coverage.  Will continue monitoring FS, log, and glucose trends, will Follow up.  Patient counseled for compliance with consistent low carb diet and exercise as tolerated outpatient.
Will decrease Lantus 32 units at bed time.  Will increase Admelog 7 units before each meal in addition to Admelog correction scale coverage.  Will continue monitoring FS, log, and glucose trends, will Follow up.  Patient counseled for compliance with consistent low carb diet and exercise as tolerated outpatient.    Pt agreeable to home insulin if needed, will continue to monitor glucose trends and insulin requirements.
Endo following  accuchecks ac and hs  dm diet  lantus and admelog as per endo
Endo following  accuchecks ac and hs  dm diet  lantus and admelog as per endo

## 2023-04-02 NOTE — DISCHARGE NOTE PROVIDER - NSDCMRMEDTOKEN_GEN_ALL_CORE_FT
acetaminophen 325 mg oral tablet: 2 tab(s) orally every 6 hours as needed for Mild Pain (1 - 3)  aspirin 81 mg oral delayed release tablet: 1 tab(s) orally once a day  atorvastatin 80 mg oral tablet: 1 tab(s) orally once a day (at bedtime)  clopidogrel 75 mg oral tablet: 1 tab(s) orally once a day  insulin glargine 100 units/mL subcutaneous solution: 32 unit(s) subcutaneous once a day (at bedtime) MDD: 32units  lidocaine 4% topical film: Apply topically to affected area every 8 to 12 hours Apply to affected area and remove in 12 hours MDD: 1  melatonin 3 mg oral tablet: 1 tab(s) orally once a day (at bedtime)  metoprolol tartrate 25 mg oral tablet: 1 tab(s) orally 2 times a day  pantoprazole 40 mg oral delayed release tablet: 1 tab(s) orally once a day (before a meal)  polyethylene glycol 3350 oral powder for reconstitution: 17 gram(s) orally once a day  senna leaf extract oral tablet: 2 tab(s) orally once a day (at bedtime)

## 2023-04-02 NOTE — PROGRESS NOTE ADULT - SUBJECTIVE AND OBJECTIVE BOX
Chief complaint  Patient is a 47y old  Female who presents with a chief complaint of CAD (02 Apr 2023 10:08)         Labs and Fingersticks  CAPILLARY BLOOD GLUCOSE      POCT Blood Glucose.: 127 mg/dL (02 Apr 2023 11:52)  POCT Blood Glucose.: 162 mg/dL (02 Apr 2023 07:30)  POCT Blood Glucose.: 202 mg/dL (01 Apr 2023 21:23)  POCT Blood Glucose.: 133 mg/dL (01 Apr 2023 16:30)      Anion Gap, Serum: 10 (04-02 @ 05:55)  Anion Gap, Serum: 11 (04-01 @ 05:13)      Calcium, Total Serum: 9.6 (04-02 @ 05:55)  Calcium, Total Serum: 8.8 (04-01 @ 05:13)  Albumin, Serum: 4.0 (04-02 @ 05:55)  Albumin, Serum: 3.5 (04-01 @ 05:13)    Alanine Aminotransferase (ALT/SGPT): 27 (04-02 @ 05:55)  Alanine Aminotransferase (ALT/SGPT): 17 (04-01 @ 05:13)  Alkaline Phosphatase, Serum: 52 (04-02 @ 05:55)  Alkaline Phosphatase, Serum: 43 (04-01 @ 05:13)  Aspartate Aminotransferase (AST/SGOT): 22 (04-02 @ 05:55)  Aspartate Aminotransferase (AST/SGOT): 17 (04-01 @ 05:13)        04-02    137  |  101  |  12  ----------------------------<  155<H>  4.4   |  26  |  0.51    Ca    9.6      02 Apr 2023 05:55  Phos  4.2     04-02  Mg     1.7     04-02    TPro  6.8  /  Alb  4.0  /  TBili  0.8  /  DBili  x   /  AST  22  /  ALT  27  /  AlkPhos  52  04-02                        8.5    9.64  )-----------( 274      ( 02 Apr 2023 05:56 )             27.1     Medications  MEDICATIONS  (STANDING):  aspirin enteric coated 81 milliGRAM(s) Oral daily  atorvastatin 80 milliGRAM(s) Oral at bedtime  bisacodyl Suppository 10 milliGRAM(s) Rectal once  chlorhexidine 2% Cloths 1 Application(s) Topical daily  clopidogrel Tablet 75 milliGRAM(s) Oral daily  enoxaparin Injectable 40 milliGRAM(s) SubCutaneous every 24 hours  insulin glargine Injectable (LANTUS) 32 Unit(s) SubCutaneous at bedtime  insulin lispro (ADMELOG) corrective regimen sliding scale   SubCutaneous three times a day before meals  insulin lispro (ADMELOG) corrective regimen sliding scale   SubCutaneous at bedtime  insulin lispro Injectable (ADMELOG) 7 Unit(s) SubCutaneous three times a day before meals  lidocaine   4% Patch 1 Patch Transdermal daily  melatonin 3 milliGRAM(s) Oral at bedtime  metoprolol tartrate 25 milliGRAM(s) Oral two times a day  ondansetron Injectable 4 milliGRAM(s) IV Push once  pantoprazole    Tablet 40 milliGRAM(s) Oral before breakfast  polyethylene glycol 3350 17 Gram(s) Oral daily  senna 2 Tablet(s) Oral at bedtime      Physical Exam  General: Patient comfortable in bed  Vital Signs Last 12 Hrs  T(F): 98.1 (04-02-23 @ 04:00), Max: 98.1 (04-02-23 @ 04:00)  HR: 84 (04-02-23 @ 04:00) (84 - 84)  BP: 105/67 (04-02-23 @ 04:00) (105/67 - 105/67)  BP(mean): 79 (04-02-23 @ 04:00) (79 - 79)  RR: 18 (04-02-23 @ 04:00) (18 - 18)  SpO2: 99% (04-02-23 @ 04:00) (99% - 99%)    CVS: S1S2   Respiratory: No wheezing, no crepitations  GI: Abdomen soft, bowel sounds positive  Musculoskeletal:  moves all extremities  : Voiding          Chief complaint  Patient is a 47y old  Female who presents with a chief complaint of CAD (02 Apr 2023 10:08)     Labs and Fingersticks  CAPILLARY BLOOD GLUCOSE      POCT Blood Glucose.: 127 mg/dL (02 Apr 2023 11:52)  POCT Blood Glucose.: 162 mg/dL (02 Apr 2023 07:30)  POCT Blood Glucose.: 202 mg/dL (01 Apr 2023 21:23)  POCT Blood Glucose.: 133 mg/dL (01 Apr 2023 16:30)      Anion Gap, Serum: 10 (04-02 @ 05:55)  Anion Gap, Serum: 11 (04-01 @ 05:13)      Calcium, Total Serum: 9.6 (04-02 @ 05:55)  Calcium, Total Serum: 8.8 (04-01 @ 05:13)  Albumin, Serum: 4.0 (04-02 @ 05:55)  Albumin, Serum: 3.5 (04-01 @ 05:13)    Alanine Aminotransferase (ALT/SGPT): 27 (04-02 @ 05:55)  Alanine Aminotransferase (ALT/SGPT): 17 (04-01 @ 05:13)  Alkaline Phosphatase, Serum: 52 (04-02 @ 05:55)  Alkaline Phosphatase, Serum: 43 (04-01 @ 05:13)  Aspartate Aminotransferase (AST/SGOT): 22 (04-02 @ 05:55)  Aspartate Aminotransferase (AST/SGOT): 17 (04-01 @ 05:13)        04-02    137  |  101  |  12  ----------------------------<  155<H>  4.4   |  26  |  0.51    Ca    9.6      02 Apr 2023 05:55  Phos  4.2     04-02  Mg     1.7     04-02    TPro  6.8  /  Alb  4.0  /  TBili  0.8  /  DBili  x   /  AST  22  /  ALT  27  /  AlkPhos  52  04-02                        8.5    9.64  )-----------( 274      ( 02 Apr 2023 05:56 )             27.1     Medications  MEDICATIONS  (STANDING):  aspirin enteric coated 81 milliGRAM(s) Oral daily  atorvastatin 80 milliGRAM(s) Oral at bedtime  bisacodyl Suppository 10 milliGRAM(s) Rectal once  chlorhexidine 2% Cloths 1 Application(s) Topical daily  clopidogrel Tablet 75 milliGRAM(s) Oral daily  enoxaparin Injectable 40 milliGRAM(s) SubCutaneous every 24 hours  insulin glargine Injectable (LANTUS) 32 Unit(s) SubCutaneous at bedtime  insulin lispro (ADMELOG) corrective regimen sliding scale   SubCutaneous three times a day before meals  insulin lispro (ADMELOG) corrective regimen sliding scale   SubCutaneous at bedtime  insulin lispro Injectable (ADMELOG) 7 Unit(s) SubCutaneous three times a day before meals  lidocaine   4% Patch 1 Patch Transdermal daily  melatonin 3 milliGRAM(s) Oral at bedtime  metoprolol tartrate 25 milliGRAM(s) Oral two times a day  ondansetron Injectable 4 milliGRAM(s) IV Push once  pantoprazole    Tablet 40 milliGRAM(s) Oral before breakfast  polyethylene glycol 3350 17 Gram(s) Oral daily  senna 2 Tablet(s) Oral at bedtime      Physical Exam  General: Patient comfortable in bed  Vital Signs Last 12 Hrs  T(F): 98.1 (04-02-23 @ 04:00), Max: 98.1 (04-02-23 @ 04:00)  HR: 84 (04-02-23 @ 04:00) (84 - 84)  BP: 105/67 (04-02-23 @ 04:00) (105/67 - 105/67)  BP(mean): 79 (04-02-23 @ 04:00) (79 - 79)  RR: 18 (04-02-23 @ 04:00) (18 - 18)  SpO2: 99% (04-02-23 @ 04:00) (99% - 99%)    CVS: S1S2   Respiratory: No wheezing, no crepitations  GI: Abdomen soft, bowel sounds positive  Musculoskeletal:  moves all extremities  : Voiding

## 2023-04-02 NOTE — PROGRESS NOTE ADULT - ASSESSMENT
Assessment  DMT2: 47y Female with DM T2 with hyperglycemia, A1C 8.3% , was on oral meds at home, s/p surgery, transitioned off insulin drip now on basal bolus  insulin with coverage, glucose now down tredning, insulin adjusted, now eating meals  and transferred to step down.   CAD: on medications, stable, monitored. s/p mid CABG, [facility] global [facility].   ?Hypothyroidism: Not on supplemental synthroid. TSH mildly elevated 4.37, now normal TSH and normal FT4 . Euthyroid.   HTN: on antihypertensive medications, monitored, asymptomatic.        Discussed plan and management wit Dr Jimi Krause MD  Cell: [phone]  Office: [phone]               Assessment  DMT2: 47y Female with DM T2 with hyperglycemia, A1C 8.3% , was on oral meds at home, s/p surgery, transitioned off insulin drip now on basal bolus  insulin with coverage, glucose now down treding, insulin adjusted, now eating meals  and transferred to step down.   CAD: on medications, stable, monitored. s/p mid CABG, [facility] global care.   ?Hypothyroidism: Not on supplemental synthroid. TSH mildly elevated 4.37, now normal TSH and normal FT4 . Euthyroid.   HTN: on antihypertensive medications, monitored, asymptomatic.        Discussed plan and management wit Dr Jimi Krause MD  Cell: [phone]  Office: [phone]

## 2023-04-02 NOTE — DISCHARGE NOTE PROVIDER - NSRESEARCHGRANT_OVERRIDEREC_GEN_A_CORE
Dual antiplatelet therapy/This is a surgical and/or non-medical patient. Patient requests all Lab, Cardiology, and Radiology Results on their Discharge Instructions

## 2023-04-03 ENCOUNTER — APPOINTMENT (OUTPATIENT)
Dept: CARE COORDINATION | Facility: HOME HEALTH | Age: 48
End: 2023-04-03
Payer: MEDICAID

## 2023-04-03 VITALS
OXYGEN SATURATION: 99 % | SYSTOLIC BLOOD PRESSURE: 120 MMHG | HEART RATE: 92 BPM | RESPIRATION RATE: 17 BRPM | DIASTOLIC BLOOD PRESSURE: 82 MMHG

## 2023-04-03 PROCEDURE — 99024 POSTOP FOLLOW-UP VISIT: CPT

## 2023-04-03 RX ORDER — LEVOTHYROXINE SODIUM 50 UG/1
50 TABLET ORAL DAILY
Qty: 30 | Refills: 3 | Status: DISCONTINUED | COMMUNITY
Start: 2018-08-17 | End: 2023-04-03

## 2023-04-03 RX ORDER — ASPIRIN 81 MG/1
81 TABLET ORAL DAILY
Refills: 0 | Status: ACTIVE | COMMUNITY

## 2023-04-03 RX ORDER — MEDROXYPROGESTERONE ACETATE 10 MG/1
10 TABLET ORAL
Qty: 10 | Refills: 3 | Status: DISCONTINUED | COMMUNITY
Start: 2018-08-17 | End: 2023-04-03

## 2023-04-03 RX ORDER — GLYBURIDE 5 MG/1
5 TABLET ORAL
Refills: 0 | Status: DISCONTINUED | COMMUNITY
Start: 2018-08-15 | End: 2023-04-03

## 2023-04-03 NOTE — PHYSICAL EXAM
[] : no respiratory distress [Exaggerated Use Of Accessory Muscles For Inspiration] : no accessory muscle use [Auscultation Breath Sounds / Voice Sounds] : lungs were clear to auscultation bilaterally [Heart Sounds] : normal S1 and S2 [Chest Visual Inspection Thoracic Asymmetry] : no chest asymmetry [Bowel Sounds] : normal bowel sounds [Oriented To Time, Place, And Person] : oriented to person, place, and time [FreeTextEntry2] : B/L LE ankle +1 edema  [FreeTextEntry1] : LB 4/1

## 2023-04-03 NOTE — HISTORY OF PRESENT ILLNESS
[FreeTextEntry1] : 48 yo F presenting to the ED yesterday with a HA over last couple days. During\par her evaluation she noted that she has had several episodes of intermittent left\par sided CP over the last couple days. States that it is exertional in nature and\par radiates to her left arm. States that she can walk about 1/2block before she\par starts to have symptoms. Prior to this she reports that she had a couple short\par lived episodes of [place] but her ECG's were normal\par so no further evaluation was done. She denies LE edema, orthopnea, PND. Her ECG\par was NSR without ischemic findings and her Trop T was <6. As a result she was\par ordered for a CTA coronaries which is concerning for a severe obstruction of\par the pLAD.  (18 Mar 2023 20:02)\par  \par On 3/28/23 s/p robotic MID CABG LIMA\par Post op Course:\par Postop 3/30 orthostatic; prbc given; levo started; echo neg pericardial\par effusion; levo d/c\par 3/31 tx sdu; RSR/ ST ; low dose bb started; lt ct d/c this am; ck f/u\par chest x-ray; d/c jodi/ della/ michelle ; pain management\par endo following for dm management; Lantus / ADMELOG adjusted as per endo.\par 4/1 VVS; Continue with current medication regimen.  Patient may transfer to the\par floor.  Likely D/C home in AM.\par Discharge planning- home when stable\par 4/2 D/C to home VSS on Lantus/ademlog for DM teaching done. s/p Robotic MIDCAD\par CABG LIMA\par \par 4/3 Initial visit completed at home\par CC " I am doing well"

## 2023-04-08 ENCOUNTER — TRANSCRIPTION ENCOUNTER (OUTPATIENT)
Age: 48
End: 2023-04-08

## 2023-04-08 ENCOUNTER — EMERGENCY (EMERGENCY)
Facility: HOSPITAL | Age: 48
LOS: 1 days | Discharge: HOME | End: 2023-04-08
Attending: STUDENT IN AN ORGANIZED HEALTH CARE EDUCATION/TRAINING PROGRAM
Payer: MEDICAID

## 2023-04-08 VITALS
HEIGHT: 66 IN | OXYGEN SATURATION: 98 % | DIASTOLIC BLOOD PRESSURE: 72 MMHG | RESPIRATION RATE: 16 BRPM | WEIGHT: 175.05 LBS | HEART RATE: 80 BPM | SYSTOLIC BLOOD PRESSURE: 110 MMHG | TEMPERATURE: 99 F

## 2023-04-08 VITALS
TEMPERATURE: 98 F | SYSTOLIC BLOOD PRESSURE: 125 MMHG | DIASTOLIC BLOOD PRESSURE: 75 MMHG | RESPIRATION RATE: 16 BRPM | HEART RATE: 85 BPM | OXYGEN SATURATION: 100 %

## 2023-04-08 DIAGNOSIS — Z98.891 HISTORY OF UTERINE SCAR FROM PREVIOUS SURGERY: Chronic | ICD-10-CM

## 2023-04-08 LAB
ALBUMIN SERPL ELPH-MCNC: 3.8 G/DL — SIGNIFICANT CHANGE UP (ref 3.3–5)
ALP SERPL-CCNC: 67 U/L — SIGNIFICANT CHANGE UP (ref 40–120)
ALT FLD-CCNC: 18 U/L — SIGNIFICANT CHANGE UP (ref 10–45)
ANION GAP SERPL CALC-SCNC: 8 MMOL/L — SIGNIFICANT CHANGE UP (ref 5–17)
APTT BLD: 30.8 SEC — SIGNIFICANT CHANGE UP (ref 27.5–35.5)
AST SERPL-CCNC: 12 U/L — SIGNIFICANT CHANGE UP (ref 10–40)
BASOPHILS # BLD AUTO: 0.06 K/UL — SIGNIFICANT CHANGE UP (ref 0–0.2)
BASOPHILS NFR BLD AUTO: 0.5 % — SIGNIFICANT CHANGE UP (ref 0–2)
BILIRUB SERPL-MCNC: 0.4 MG/DL — SIGNIFICANT CHANGE UP (ref 0.2–1.2)
BLD GP AB SCN SERPL QL: NEGATIVE — SIGNIFICANT CHANGE UP
BUN SERPL-MCNC: 16 MG/DL — SIGNIFICANT CHANGE UP (ref 7–23)
CALCIUM SERPL-MCNC: 9.1 MG/DL — SIGNIFICANT CHANGE UP (ref 8.4–10.5)
CHLORIDE SERPL-SCNC: 108 MMOL/L — SIGNIFICANT CHANGE UP (ref 96–108)
CO2 SERPL-SCNC: 22 MMOL/L — SIGNIFICANT CHANGE UP (ref 22–31)
CREAT SERPL-MCNC: 0.54 MG/DL — SIGNIFICANT CHANGE UP (ref 0.5–1.3)
EGFR: 116 ML/MIN/1.73M2 — SIGNIFICANT CHANGE UP
EOSINOPHIL # BLD AUTO: 0.17 K/UL — SIGNIFICANT CHANGE UP (ref 0–0.5)
EOSINOPHIL NFR BLD AUTO: 1.5 % — SIGNIFICANT CHANGE UP (ref 0–6)
GLUCOSE SERPL-MCNC: 118 MG/DL — HIGH (ref 70–99)
HCT VFR BLD CALC: 27.6 % — LOW (ref 34.5–45)
HGB BLD-MCNC: 8.5 G/DL — LOW (ref 11.5–15.5)
IMM GRANULOCYTES NFR BLD AUTO: 0.4 % — SIGNIFICANT CHANGE UP (ref 0–0.9)
INR BLD: 1.45 RATIO — HIGH (ref 0.88–1.16)
LYMPHOCYTES # BLD AUTO: 2.36 K/UL — SIGNIFICANT CHANGE UP (ref 1–3.3)
LYMPHOCYTES # BLD AUTO: 20.6 % — SIGNIFICANT CHANGE UP (ref 13–44)
MCHC RBC-ENTMCNC: 27.8 PG — SIGNIFICANT CHANGE UP (ref 27–34)
MCHC RBC-ENTMCNC: 30.8 GM/DL — LOW (ref 32–36)
MCV RBC AUTO: 90.2 FL — SIGNIFICANT CHANGE UP (ref 80–100)
MONOCYTES # BLD AUTO: 0.72 K/UL — SIGNIFICANT CHANGE UP (ref 0–0.9)
MONOCYTES NFR BLD AUTO: 6.3 % — SIGNIFICANT CHANGE UP (ref 2–14)
NEUTROPHILS # BLD AUTO: 8.08 K/UL — HIGH (ref 1.8–7.4)
NEUTROPHILS NFR BLD AUTO: 70.7 % — SIGNIFICANT CHANGE UP (ref 43–77)
NRBC # BLD: 0 /100 WBCS — SIGNIFICANT CHANGE UP (ref 0–0)
PLATELET # BLD AUTO: 397 K/UL — SIGNIFICANT CHANGE UP (ref 150–400)
POTASSIUM SERPL-MCNC: 4.6 MMOL/L — SIGNIFICANT CHANGE UP (ref 3.5–5.3)
POTASSIUM SERPL-SCNC: 4.6 MMOL/L — SIGNIFICANT CHANGE UP (ref 3.5–5.3)
PROT SERPL-MCNC: 6.9 G/DL — SIGNIFICANT CHANGE UP (ref 6–8.3)
PROTHROM AB SERPL-ACNC: 16.7 SEC — HIGH (ref 10.5–13.4)
RBC # BLD: 3.06 M/UL — LOW (ref 3.8–5.2)
RBC # FLD: 14.4 % — SIGNIFICANT CHANGE UP (ref 10.3–14.5)
RH IG SCN BLD-IMP: POSITIVE — SIGNIFICANT CHANGE UP
SODIUM SERPL-SCNC: 138 MMOL/L — SIGNIFICANT CHANGE UP (ref 135–145)
WBC # BLD: 11.44 K/UL — HIGH (ref 3.8–10.5)
WBC # FLD AUTO: 11.44 K/UL — HIGH (ref 3.8–10.5)

## 2023-04-08 PROCEDURE — 86901 BLOOD TYPING SEROLOGIC RH(D): CPT

## 2023-04-08 PROCEDURE — 71045 X-RAY EXAM CHEST 1 VIEW: CPT

## 2023-04-08 PROCEDURE — 71045 X-RAY EXAM CHEST 1 VIEW: CPT | Mod: 26

## 2023-04-08 PROCEDURE — 85610 PROTHROMBIN TIME: CPT

## 2023-04-08 PROCEDURE — 99285 EMERGENCY DEPT VISIT HI MDM: CPT

## 2023-04-08 PROCEDURE — 93005 ELECTROCARDIOGRAM TRACING: CPT

## 2023-04-08 PROCEDURE — 80053 COMPREHEN METABOLIC PANEL: CPT

## 2023-04-08 PROCEDURE — 85730 THROMBOPLASTIN TIME PARTIAL: CPT

## 2023-04-08 PROCEDURE — 36415 COLL VENOUS BLD VENIPUNCTURE: CPT

## 2023-04-08 PROCEDURE — 86900 BLOOD TYPING SEROLOGIC ABO: CPT

## 2023-04-08 PROCEDURE — 99285 EMERGENCY DEPT VISIT HI MDM: CPT | Mod: 25

## 2023-04-08 PROCEDURE — 86850 RBC ANTIBODY SCREEN: CPT

## 2023-04-08 PROCEDURE — 85025 COMPLETE CBC W/AUTO DIFF WBC: CPT

## 2023-04-08 NOTE — ED PROVIDER NOTE - NSFOLLOWUPINSTRUCTIONS_ED_ALL_ED_FT
Please see the information of Coronary Artery Bypass Grafting, Care After.    No heavy lifting or strain your chest.    Keep the surgery wound clean and dry as your surgeon instructed.    Keep continue your current medications as prescribed.    Follow up with your surgeon Dr. Nix on Monday.    Return for any concerns, fever numbness, weakness, difficult breathing, leg swelling, or worsening bleeding.

## 2023-04-08 NOTE — ED PROVIDER NOTE - OBJECTIVE STATEMENT
48yo female pt with PMx of HTN, HLD, DM, CAD s/p Minimally invasive direct coronary artery bypass (MIDCAB) with transesophageal echocardiography and left chest tube placed on 3/28/23 by Dr. Nix c/o left chest tube site bleeding since yesterday. Reports she noticed leaking blood from left chest tube incision site yesterday after sneezing and worsening bleeding today. Denies fever, chills, sore throat, or cold symptoms. Denies CP/SOB/ABD pain or N/V/D. Denies worsening op site pain. Denies calf pain or leg swelling. Pt's on Plavix and ASA 81mg.

## 2023-04-08 NOTE — ED PROVIDER NOTE - PATIENT PORTAL LINK FT
You can access the FollowMyHealth Patient Portal offered by [facility] by registering at the following website: [email or web address]. By joining [employer]’s FollowMyHealth portal, you will also be able to view your health information using other applications (apps) compatible with our system.

## 2023-04-08 NOTE — CHART NOTE - NSCHARTNOTEFT_GEN_A_CORE
patient SP  CABG  march 2023  to ED today with complaints of bleeding from chest tube site.   HCT stable VSS,  sight  CDI,    awiting chest CT today, if stable home with follow up in CTS ofiice this week.  CHAPARRO Samaniego NP

## 2023-04-08 NOTE — ED CLERICAL - NS ED CLERK NOTE PRE-ARRIVAL INFORMATION; ADDITIONAL PRE-ARRIVAL INFORMATION
This patient is enrolled in the Follow Your Heart program and has undergone a cardiac surgery procedure within the last 30 days and has active care navigation. This patient can be followed up by the care navigation team within 24 hours. To arrange close follow-up or to obtain additional clinical information about this patient, please call the contact number above. Please call the cardiac surgery team once patient is registered at [phone] for consultation PRIOR to disposition decision.  The patient recently underwent a cardiac surgery procedure and the team can assist in acute medical management.

## 2023-04-08 NOTE — ED ADULT TRIAGE NOTE - CHIEF COMPLAINT QUOTE
bleeding from CABG site since last night, bleeding became worse after sneezing this morning  had procedure on 3/28/23  on plavix and aspirin

## 2023-04-08 NOTE — ED PROVIDER NOTE - PHYSICAL EXAMINATION
NAD. VSS. Afebrile. Lungs clear. +Left chest wall; op wound under breast and stitch on chest tube site with leaking blood. No obvious eryth, swelling, or tender. ABD soft, non tender. No peripheral edema or calf tender. Neuro- intact.

## 2023-04-08 NOTE — ED PROVIDER NOTE - PROGRESS NOTE DETAILS
No call back from CT surgery and paged again. Spoked to CT surgery Dr. Farhad Portillo and sent pt's info for consult via TEAM. CT surgery at bedside. Spoke to CT surgery Dr. Farhad Portillo and sent pt's info for consult via TEAM. Wound dressing performed by CT surgery [place]. Pt's awaiting for CT, YIFAN. Pt's evaluated by cardiac surgery team, Dr. Maciel, and recommended d/c chest CT due to superficial wound bleeding, and outpt f/u with her surgeon Dr. Nix on Monday.

## 2023-04-08 NOTE — ED PROVIDER NOTE - ATTENDING APP SHARED VISIT CONTRIBUTION OF CARE
I, Harjeet Lomeli, performed a history and physical exam of the patient and discussed their management with the resident and/or advanced care provider. I reviewed the resident and/or advanced care provider's note and agree with the documented findings and plan of care except where noted. I was present and available for all procedures.      see mdm

## 2023-04-08 NOTE — ED ADULT TRIAGE NOTE - ESI TRIAGE ACUITY LEVEL, MLM
3 O-Z Flap Text: The defect edges were debeveled with a #15 scalpel blade.  Given the location of the defect, shape of the defect and the proximity to free margins an O-Z flap was deemed most appropriate.  Using a sterile surgical marker, an appropriate transposition flap was drawn incorporating the defect and placing the expected incisions within the relaxed skin tension lines where possible. The area thus outlined was incised deep to adipose tissue with a #15 scalpel blade.  The skin margins were undermined to an appropriate distance in all directions utilizing iris scissors.

## 2023-04-08 NOTE — ED PROVIDER NOTE - CARE PROVIDER_API CALL
Polo Nix)  Surgery; Thoracic Surgery  [address] [place] [address]  Phone: [phone]  Fax: [phone]  Follow Up Time:

## 2023-04-08 NOTE — ED ADULT NURSE NOTE - OBJECTIVE STATEMENT
46 yo f arrived to the ed by ambulance from home c/o bleeding from CABG site since last night, bleeding became worse after sneezing this morning  had procedure on 3/28/23; pt currently on plavix and aspirin; denies pain or other complaints 48 yo f arrived to the ed by ambulance from home PMx of HTN, HLD, DM, CAD s/p Minimally invasive direct coronary artery bypass (MIDCAB) with transesophageal echocardiography and left chest tube placed on 3/28/23 by Dr. Nix c/o left chest tube site bleeding since yesterday. Reports she noticed leaking blood from left chest tube incision site yesterday after sneezing and worsening bleeding today. Denies fever, chills, sore throat, or cold symptoms. Denies CP/SOB/ABD pain or N/V/D. Denies worsening op site pain. Denies calf pain or leg swelling. pt currently on plavix and aspirin; denies pain or other complaints    46yo female pt with Pt's on Plavix and ASA 81mg.

## 2023-04-10 ENCOUNTER — OUTPATIENT (OUTPATIENT)
Dept: OUTPATIENT SERVICES | Facility: HOSPITAL | Age: 48
LOS: 1 days | End: 2023-04-10
Payer: MEDICAID

## 2023-04-10 ENCOUNTER — APPOINTMENT (OUTPATIENT)
Dept: CARE COORDINATION | Facility: HOME HEALTH | Age: 48
End: 2023-04-10
Payer: MEDICAID

## 2023-04-10 ENCOUNTER — APPOINTMENT (OUTPATIENT)
Dept: CARDIOTHORACIC SURGERY | Facility: CLINIC | Age: 48
End: 2023-04-10
Payer: MEDICAID

## 2023-04-10 VITALS — WEIGHT: 175 LBS | RESPIRATION RATE: 16 BRPM | BODY MASS INDEX: 25.84 KG/M2

## 2023-04-10 DIAGNOSIS — Z95.1 PRESENCE OF AORTOCORONARY BYPASS GRAFT: ICD-10-CM

## 2023-04-10 DIAGNOSIS — Z98.891 HISTORY OF UTERINE SCAR FROM PREVIOUS SURGERY: Chronic | ICD-10-CM

## 2023-04-10 PROCEDURE — 71046 X-RAY EXAM CHEST 2 VIEWS: CPT | Mod: 26

## 2023-04-10 PROCEDURE — 99024 POSTOP FOLLOW-UP VISIT: CPT

## 2023-04-10 PROCEDURE — 71046 X-RAY EXAM CHEST 2 VIEWS: CPT

## 2023-04-10 RX ORDER — METOPROLOL SUCCINATE 25 MG/1
25 TABLET, EXTENDED RELEASE ORAL
Refills: 0 | Status: COMPLETED | COMMUNITY
End: 2023-04-10

## 2023-04-10 RX ORDER — MELATONIN 3 MG
3 CAPSULE ORAL
Refills: 0 | Status: COMPLETED | COMMUNITY
End: 2023-04-10

## 2023-04-10 NOTE — HISTORY OF PRESENT ILLNESS
[FreeTextEntry1] : 46 yo F presenting to the ED yesterday with a HA over last couple days. During \par her evaluation she noted that she has had several episodes of intermittent left \par sided CP over the last couple days. States that it is exertional in nature and \par radiates to her left arm. States that she can walk about 1/2block before she \par starts to have symptoms. Prior to this she reports that she had a couple short \par lived episodes of [place] but her ECG's were normal \par so no further evaluation was done. She denies LE edema, orthopnea, PND. Her ECG \par was NSR without ischemic findings and her Trop T was <6. As a result she was \par ordered for a CTA coronaries which is concerning for a severe obstruction of \par the pLAD.  (18 Mar 2023 20:02) \par \par On 3/28/23 s/p robotic MID CABG LIMA \par Post op Course: \par Postop 3/30 orthostatic; prbc given; levo started; echo neg pericardial \par effusion; levo d/c \par 3/31 tx sdu; RSR/ ST ; low dose bb started; lt ct d/c this am; ck f/u \par chest x-ray; d/c jodi/ della/ michelle ; pain management \par endo following for dm management; Lantus / ADMELOG adjusted as per endo. \par 4/1 VVS; Continue with current medication regimen.  Patient may transfer to the \par floor.  Likely D/C home in AM. \par Discharge planning- home when stable \par 4/2 D/C to home VSS on Lantus/ademlog for DM teaching done. s/p Robotic MIDCAD \par CABG LIMA \par 4/8 pt. was ED treat and release for bleeding from her CT incision site\par 4/10 second visit completed via [employer]\par CC " I feel ok"

## 2023-04-10 NOTE — PHYSICAL EXAM
[] : no respiratory distress [Exaggerated Use Of Accessory Muscles For Inspiration] : no accessory muscle use [Chest Visual Inspection Thoracic Asymmetry] : no chest asymmetry [FreeTextEntry1] : LB 4/10 [Oriented To Time, Place, And Person] : oriented to person, place, and time

## 2023-04-10 NOTE — COUNSELING
[Hygeine (Including Daily Shower)] : hygeine (including daily shower) [Importance of Regular Medical Follow-Up] : the importance of regular medical follow-up [No Heavy Lifting] : no heavy lifting (>15-20 lb. for 1 month or 25 lb. for 3 months from date of surgery) [Blood Pressure Control] : blood pressure control [Weight Management] : weight management [S/S of infection] : signs and symptoms of infection (and to whom it should be reported) [Progressive Ambulation/Activity] : progressive ambulation/activity [Medication/Vitamin/Herb/Food Interaction] : medication/vitamin/herb/food interaction [Smoking Cessation] : smoking cessation [Blood Sugar Control] : blood sugar control [Stress Management] : stress management

## 2023-04-11 VITALS
OXYGEN SATURATION: 97 % | RESPIRATION RATE: 16 BRPM | DIASTOLIC BLOOD PRESSURE: 83 MMHG | WEIGHT: 175 LBS | BODY MASS INDEX: 25.92 KG/M2 | HEIGHT: 69 IN | HEART RATE: 88 BPM | SYSTOLIC BLOOD PRESSURE: 127 MMHG | TEMPERATURE: 97.8 F

## 2023-04-11 NOTE — ASSESSMENT
[FreeTextEntry1] : Today on exam patient's lungs clear bilaterally, normal sinus rhythm, sternum stable, incision clean, dry and intact. Trace peripheral edema noted. Instructed patient on importance of optimal glycemic control, daily showering, daily weights, incentive spirometer use, and increase ambulation as tolerated. Instructed to call office with any signs or symptoms of infection or weight gain of 2 or more pounds in 1 day or 3 or more pounds in 1 week. \par \par Plan:\par \par - CXR today reviewed\par - Continue current medication regimen\par - Follow up with cardiologist \par - Continue to walk and increase as tolerated, IS 10X hour while awake\par - Low salt diet and fluids discussed in detail\par - Call with any questions or concerns\par - Will follow up end of week.  If continued drainage will bring in to sono site and potential thoracentesis. \par \par

## 2023-04-11 NOTE — DISCUSSION/SUMMARY
[Doing Well] : is doing well [0] : 0 [Coumadin] : the patient is not on Coumadin [Remove Sutures/Staples] : removed sutures/staples

## 2023-04-11 NOTE — PHYSICAL EXAM
[] : no respiratory distress [Respiration, Rhythm And Depth] : normal respiratory rhythm and effort [Exaggerated Use Of Accessory Muscles For Inspiration] : no accessory muscle use [Auscultation Breath Sounds / Voice Sounds] : lungs were clear to auscultation bilaterally [Apical Impulse] : the apical impulse was normal [Heart Rate And Rhythm] : heart rate was normal and rhythm regular [Heart Sounds] : normal S1 and S2 [Clean] : clean [Dry] : dry [Healing Well] : healing well [Bleeding] : no active bleeding [Foul Odor] : no foul smell [Purulent Drainage] : no purulent drainage [Serosanguinous Drainage] : no serosanguinous drainage [Erythema] : not erythematous [Warm] : not warm [Tender] : not tender [FreeTextEntry2] : Left incision cdi, CT site scabbed without drainage [___ +] : bilateral ankle [unfilled]U+ pitting edema

## 2023-04-11 NOTE — REASON FOR VISIT
[de-identified] : robotic MID CABG LIMA -LAD [de-identified] : 3/28/2023 [de-identified] : Post op Course: orthostatic; prbc given; levo started; echo neg pericardial effusion; RSR/ ST ; low dose bb started; endo following for dm management; Lantus / ADMELOG adjusted as per endo. DC home.  Was seen in ED 4/8/2023 for bleeding from her CT incision site, released.\par \par Cathed by Dr. Lauren Colbert, referred by Dr. Gaston and Dr. Guerra.  Presents today with her son and reports she is feeling a little better each day.  She reports she coughed on Friday and noticed dark bloody drainage coming out of her old CT site.  She was seen in Ed and sent home then sneezed the following day and returned to ED for more drainage. She reports they put a "bag" to the area that drained 500ml on Sunday.  She reports it has not drained since last night.  gauze to area without drainage. Eating and drinking with +BM. Denies chest pain, SOB, swelling, weight gain, palpitations, cough, fever or chills.\par

## 2023-04-11 NOTE — END OF VISIT
[FreeTextEntry3] : Written by Sony Solis NP, acting as a scribe for Dr. Jonas\par “The documentation recorded by the scribe accurately reflects the service I personally performed and the decisions made by me.” Signature Derek Jonas MD.

## 2023-04-19 ENCOUNTER — APPOINTMENT (OUTPATIENT)
Dept: CARDIOTHORACIC SURGERY | Facility: CLINIC | Age: 48
End: 2023-04-19
Payer: MEDICAID

## 2023-04-19 VITALS — HEIGHT: 69 IN | BODY MASS INDEX: 25.92 KG/M2 | WEIGHT: 175 LBS

## 2023-04-19 VITALS
SYSTOLIC BLOOD PRESSURE: 121 MMHG | HEART RATE: 75 BPM | TEMPERATURE: 98 F | OXYGEN SATURATION: 98 % | DIASTOLIC BLOOD PRESSURE: 85 MMHG | RESPIRATION RATE: 14 BRPM

## 2023-04-19 PROCEDURE — 99024 POSTOP FOLLOW-UP VISIT: CPT

## 2023-04-19 NOTE — COUNSELING
[Low Fat/Low Cholesterol Diet] : low fat/low cholesterol diet [Blood Sugar Control] : blood sugar control

## 2023-04-19 NOTE — PHYSICAL EXAM
[Respiration, Rhythm And Depth] : normal respiratory rhythm and effort [Auscultation Breath Sounds / Voice Sounds] : lungs were clear to auscultation bilaterally [FreeTextEntry2] : LEFT anterior chest and lateral chest wall [___ +] : bilateral ankle [unfilled]U+ pitting edema

## 2023-04-19 NOTE — ASSESSMENT
[FreeTextEntry1] : 48 yo F presenting to the ED yesterday with a HA over last couple days. During her evaluation she noted that she has had several episodes of intermittent left sided CP over the last couple days. States that it is exertional in nature and radiates to her left arm. States that she can walk about 1/2block before she starts to have symptoms. Prior to this she reports that she had a couple short lived episodes of [place] ago in [place] country but her ECG's were normal so no further evaluation was done. She denies LE edema, orthopnea, PND. Her ECG was NSR without ischemic findings and her Trop T was <6. As a result she was ordered for a CTA coronaries which is concerning for a severe obstruction of the pLAD.  (18 Mar 2023 20:02) \par \par On 3/28/23 s/p robotic MID CABG LIMA. Postop 3/30 orthostatic; prbc given; levo started; echo neg pericardial \par effusion; levo d/c RSR/ ST ; low dose bb started; lt ct d/c this am; ck f/u chest x-ray; d/c jodi/ della/ michelle ; pain management endo following for dm management; Lantus / ADMELOG adjusted as per endo.  Continue with current medication regimen.  Patient may transfer to the floor.  Lantus/ademlog for DM teaching done. s/p Robotic MIDCAD CABG LIMA.\par \par Plan:\par 1) Continue medication regimen \par 2) Follow up with cardiologist \par 3) Return in 2 weeks for clinical recheck \par \par

## 2023-04-19 NOTE — CONSULT LETTER
[Dear  ___] : Dear  [unfilled], [Courtesy Letter:] : I had the pleasure of seeing your patient, [unfilled], in my office today. [Please see my note below.] : Please see my note below. [Consult Closing:] : Thank you very much for allowing me to participate in the care of this patient.  If you have any questions, please do not hesitate to contact me. [Sincerely,] : Sincerely, [FreeTextEntry2] : Dr.Avnet Colbert [FreeTextEntry3] : Polo Nix MD\par Department of Cardiovascular &Thoracic Surgery\par \par Cardiovascular &Thoracic Surgery\par [facility].\par \par

## 2023-04-21 ENCOUNTER — APPOINTMENT (OUTPATIENT)
Dept: CARDIOLOGY | Facility: CLINIC | Age: 48
End: 2023-04-21
Payer: MEDICAID

## 2023-04-21 ENCOUNTER — NON-APPOINTMENT (OUTPATIENT)
Age: 48
End: 2023-04-21

## 2023-04-21 VITALS
BODY MASS INDEX: 25.92 KG/M2 | DIASTOLIC BLOOD PRESSURE: 72 MMHG | HEART RATE: 73 BPM | WEIGHT: 175 LBS | SYSTOLIC BLOOD PRESSURE: 108 MMHG | OXYGEN SATURATION: 100 % | HEIGHT: 69 IN

## 2023-04-21 PROCEDURE — 93000 ELECTROCARDIOGRAM COMPLETE: CPT

## 2023-04-21 PROCEDURE — 99214 OFFICE O/P EST MOD 30 MIN: CPT | Mod: 25

## 2023-04-21 NOTE — DISCUSSION/SUMMARY
[Coronary Artery Disease] : coronary artery disease [Hyperlipidemia] : hyperlipidemia [___ Month(s)] : in [unfilled] month(s) [FreeTextEntry1] : \par \par 48 year old with hx of DM recent admission for chest pain found to have  of the LAD and underwent single vessel robotic CABG with Dr. Nix.  LV function is normal and no valve disease. needs blood work, maintain current meds.  ask Dr. Nix why on plavix and duration [EKG obtained to assist in diagnosis and management of assessed problem(s)] : EKG obtained to assist in diagnosis and management of assessed problem(s)

## 2023-04-21 NOTE — CARDIOLOGY SUMMARY
[de-identified] : Sinus  Rhythm \par -  Negative precordial T-waves  -Probably normal -consider anteroseptal ischemia. \par \par PROBABLY NORMAL FOR AGE

## 2023-04-21 NOTE — HISTORY OF PRESENT ILLNESS
[FreeTextEntry1] : \par Pt is 48 year old female  with 15 year hx of DM and recent dx htn and elevated cholesterol.  [place].  she presented to hosp with chest pain and found to have  of LAD and underwent robotic CABG with Dr. Nix.  no chest pain, but little sticking pain. has drainage and swelling from surgical site and being followed by Dr. Nix.  \par never smoked\par rare social alcohol\par hx of c section\par nkda\par father had  CABG two years ago\par

## 2023-04-22 LAB
ALBUMIN SERPL ELPH-MCNC: 4.3 G/DL
ALP BLD-CCNC: 78 U/L
ALT SERPL-CCNC: 15 U/L
ANION GAP SERPL CALC-SCNC: 12 MMOL/L
AST SERPL-CCNC: 16 U/L
BILIRUB SERPL-MCNC: 0.5 MG/DL
BUN SERPL-MCNC: 13 MG/DL
CALCIUM SERPL-MCNC: 9.4 MG/DL
CHLORIDE SERPL-SCNC: 104 MMOL/L
CHOLEST SERPL-MCNC: 111 MG/DL
CO2 SERPL-SCNC: 23 MMOL/L
CREAT SERPL-MCNC: 0.53 MG/DL
EGFR: 115 ML/MIN/1.73M2
ESTIMATED AVERAGE GLUCOSE: 154 MG/DL
GLUCOSE SERPL-MCNC: 185 MG/DL
HBA1C MFR BLD HPLC: 7 %
HCT VFR BLD CALC: 32.6 %
HDLC SERPL-MCNC: 47 MG/DL
HGB BLD-MCNC: 9.7 G/DL
LDLC SERPL CALC-MCNC: 44 MG/DL
MCHC RBC-ENTMCNC: 26.6 PG
MCHC RBC-ENTMCNC: 29.8 GM/DL
MCV RBC AUTO: 89.6 FL
NONHDLC SERPL-MCNC: 65 MG/DL
PLATELET # BLD AUTO: 308 K/UL
POTASSIUM SERPL-SCNC: 4.3 MMOL/L
PROT SERPL-MCNC: 7.1 G/DL
RBC # BLD: 3.64 M/UL
RBC # FLD: 14.2 %
SODIUM SERPL-SCNC: 138 MMOL/L
TRIGL SERPL-MCNC: 103 MG/DL
WBC # FLD AUTO: 7.62 K/UL

## 2023-05-02 ENCOUNTER — TRANSCRIPTION ENCOUNTER (OUTPATIENT)
Age: 48
End: 2023-05-02

## 2023-05-03 ENCOUNTER — APPOINTMENT (OUTPATIENT)
Dept: CARDIOTHORACIC SURGERY | Facility: CLINIC | Age: 48
End: 2023-05-03
Payer: MEDICAID

## 2023-05-03 VITALS
OXYGEN SATURATION: 100 % | RESPIRATION RATE: 14 BRPM | WEIGHT: 174 LBS | HEART RATE: 74 BPM | HEIGHT: 69 IN | DIASTOLIC BLOOD PRESSURE: 74 MMHG | BODY MASS INDEX: 25.77 KG/M2 | TEMPERATURE: 98 F | SYSTOLIC BLOOD PRESSURE: 104 MMHG

## 2023-05-03 PROCEDURE — 99024 POSTOP FOLLOW-UP VISIT: CPT

## 2023-05-03 NOTE — ASSESSMENT
[FreeTextEntry1] : Today on exam patient's lungs clear bilaterally, normal sinus rhythm, sternum stable, incision clean, dry and intact.  No peripheral edema noted. Instructed patient on importance of optimal glycemic control, daily showering, daily weights, incentive spirometer use, and increase ambulation as tolerated. Instructed to call office with any signs or symptoms of infection or weight gain of 2 or more pounds in 1 day or 3 or more pounds in 1 week. \par \par Plan:\par \par - Continue current medication regimen. Plavix for 30 days then stop\par - Follow up with cardiologist Dr. Bryant\par - Follow up with PCP as scheduled\par - Continue to walk and increase as tolerated\par - Low salt diet and fluids discussed in detail\par - Tight glucose control discussed in detail for wound healing\par - Return to office PRN\par - Call with any questions or concerns\par \par

## 2023-05-03 NOTE — END OF VISIT
[FreeTextEntry3] : I, Dr. Nix, personally performed the evaluation and management for this post op patient who presents today. Evaluation includes conducting the examination, assessing all conditions and establishing a plan of care moving forward. Today, the ACP, Sony Solis, was here to observe my evaluation and management services for this patient.

## 2023-05-03 NOTE — REASON FOR VISIT
[de-identified] : MIDCAB LIMA-LAD [de-identified] : 3/28/2023 [de-identified] : Postop 3/30 orthostatic; prbc given; levo started; echo neg pericardial effusion; levo d/c RSR/ ST ; low dose bb started; lt ct d/c this am; ck f/u chest x-ray; d/c rij/ della/ michelle ; pain management endo following for dm management; Lantus / ADMELOG adjusted as per endo. Continue with current medication regimen. Patient may transfer to the floor. Lantus/ademlog for DM teaching done. s/p Robotic MIDCAD CABG LIMA.\par \par Seen twice for post op visits and returns today for re-check.  Saw Dr. Bryant last week.  Plavix?\par \par She presents today and reports she feels well.  Much better from DC and much better from prior visit.  Walking more everyday and feeling stronger. Eating and drinking with +BM. Denies chest pain, SOB, swelling, weight gain, palpitations, cough, fever or chills. Works as a  and wishes to return to work soon.  \par \par

## 2023-05-03 NOTE — CONSULT LETTER
[Dear  ___] : Dear  [unfilled], [Courtesy Letter:] : I had the pleasure of seeing your patient, [unfilled], in my office today. [Please see my note below.] : Please see my note below. [Sincerely,] : Sincerely, [FreeTextEntry2] : Dr. Bryant [FreeTextEntry3] : Polo Nix MD\par Department of Cardiovascular &Thoracic Surgery\par \par Cardiovascular &Thoracic Surgery\par [facility].\par

## 2023-05-22 ENCOUNTER — NON-APPOINTMENT (OUTPATIENT)
Age: 48
End: 2023-05-22

## 2023-05-22 ENCOUNTER — APPOINTMENT (OUTPATIENT)
Dept: CARDIOLOGY | Facility: CLINIC | Age: 48
End: 2023-05-22
Payer: MEDICAID

## 2023-05-22 VITALS
OXYGEN SATURATION: 100 % | SYSTOLIC BLOOD PRESSURE: 117 MMHG | WEIGHT: 178 LBS | HEIGHT: 69 IN | DIASTOLIC BLOOD PRESSURE: 73 MMHG | HEART RATE: 72 BPM | BODY MASS INDEX: 26.36 KG/M2

## 2023-05-22 PROCEDURE — 99214 OFFICE O/P EST MOD 30 MIN: CPT | Mod: 25

## 2023-05-22 PROCEDURE — 93000 ELECTROCARDIOGRAM COMPLETE: CPT

## 2023-05-22 NOTE — DISCUSSION/SUMMARY
[Coronary Artery Disease] : coronary artery disease [Stable] : stable [Hyperlipidemia] : hyperlipidemia [Essential Hypertension] : essential hypertension [Improving] : improving [Responding to Treatment] : responding to treatment [___ Month(s)] : in [unfilled] month(s) [EKG obtained to assist in diagnosis and management of assessed problem(s)] : EKG obtained to assist in diagnosis and management of assessed problem(s)

## 2023-05-22 NOTE — ASSESSMENT
[FreeTextEntry1] : \par \par s/p robotic CABG for LAD  by Dr. Nix.   asymptomatic,  BP well controlled.  liipds at goal.  diabetes management with her PCP.  starting cardiac rehab.  f/u 3 months

## 2023-05-22 NOTE — HISTORY OF PRESENT ILLNESS
[FreeTextEntry1] : \par Pt is 48 year old female  with 15 year hx of DM and recent dx htn and elevated cholesterol.  [place]  she presented to hosp with chest pain and found to have  of LAD and underwent robotic CABG with Dr. Nix.  no chest pain, but little sticking pain. has drainage and swelling from surgical site and being followed by Dr. Nix.  \par never smoked\par rare social alcohol\par hx of c section\par nkda\par father had  CABG two years ago\par \par \par 5/22/2023  no new complaints, blood work reviewed with pt.  will be starting cardiac rehab.  only issue is mild pedal edema.

## 2023-05-23 ENCOUNTER — APPOINTMENT (OUTPATIENT)
Dept: CARDIOLOGY | Facility: CLINIC | Age: 48
End: 2023-05-23

## 2023-05-23 ENCOUNTER — NON-APPOINTMENT (OUTPATIENT)
Age: 48
End: 2023-05-23

## 2023-05-23 NOTE — REASON FOR VISIT
[Home] : at home, [unfilled] , at the time of the visit. [Medical Office: ([facility])___] : at the medical office located in  [Verbal consent obtained from patient] : the patient, [unfilled] [Assessment] : an assessment [FreeTextEntry1] : ITP to be reviewed and manually signed by Dr. Underwood; ITP to be finalized at session #1; Clinical indication for cardiac rehabilitation: CABG

## 2023-05-23 NOTE — HISTORY OF PRESENT ILLNESS
[Type II Diabetes] : Type II Diabetes [Yes ___ How many times per day?] : Yes, [unfilled] times per day [Is Patient aware of signs and symptoms of hypoglycemia and hyperglycemia?] : patient is aware of signs and symptoms of hypoglycemia and hyperglycemia [Hypoglycemia and Hyperglycemia: sign and symptoms] : Hypoglycemia and Hyperglycemia: sign and symptoms [Role of lifestyle behaviors in diabetes management] : role of lifestyle behaviors in diabetes management [Yes, continue with Diabetes plan] : Yes, continue with Diabetes plan [None] : none [Diabetes Mellitus] : diabetes mellitus [Cardiac Rehabilitation] : Cardiac Rehabilitation [___ Days per week] : [unfilled] days per week [Target RPE: ___] : Target RPE: [unfilled] [Treadmill] : treadmill [Recumbent bike] : recumbent bike [Upright exercise bike] : upright exercise bike [BioStep] : BioStep [Elliptical] : elliptical [Upper body ergometer] : upper body ergometer [Stair Climber] : stair climber [Walking] : walking [To increase my time spent in physical activity and/or aerobic exercise] : to increase my time spent in physical activity and/or aerobic exercise [Home exercise] : home exercise [Welcome packet provided] : welcome packet provided [Yes, per exercise prescription, policy] : Yes, per exercise prescription, policy [Self-reports of improved psychosocial well-being] : Self-reports of improved psychosocial well-being [Mindfulness] : mindfulness [Meditation] : meditation [Relaxation breathing techniques] : relaxation breathing techniques [Yes, continue with psychosocial plan] : Yes, continue with psychosocial plan [Diabetes] : Diabetes [Is patient on medication for hyperlipidemia?] : Is patient on medication for hyperlipidemia? Yes [Atorvastatin] : atorvastatin [Is Patient adherent with medication?] : patient is adherent with medication [Discuss an overview of healthy eating plan] : Discuss an overview of healthy eating plan [Yes, continue with nutrition plan] : Yes, continue with nutrition plan [Action] : Stage of change: action [Height: ___] : Height: [unfilled] [Patient will lose 0.5 to 1 lb per week] : Patient will lose 0.5 to 1lb per week [Monitoring of weight at home and at cardiac rehabilitation] : Monitoring of weight at home and at cardiac rehabilitation [Individualized exercise program as developed at cardiac rehabilitation] : Individualized exercise program as developed at cardiac rehabilitation [Calories and healthy weight management] : Calories and healthy weight management [Teach back utilized] : teach back utilized [Yes, continue with Weight Management plan] : Yes, continue with weight management plan [In progress] : in progress [Self-reports of improved health eating patterns] : Self-reports of improved health eating patterns [FreeTextEntry2] : 109 [FreeTextEntry6] : intake: stated weight 178.6 lbs. on 5/3/23; patient states she would like to lose 10-20 lbs. [Angina with exercise] : no angina with exercise [] : no [FreeTextEntry7] : works as , [de-identified] : Sessions #1 and #36 to be center-based. Sessions #2-#35 virtual. [de-identified] : intake: patient has no exercise machines at home; modality will be floor aerobics via video [FreeTextEntry9] : Intake: Patient states she is doing well emotionally, had positive and negative days initially postoperatively. She added she is moving within the next few weeks and would like to start the virtual sessions after she is settled in her new place.  [FreeTextEntry8] : Intake:Patient states she is eating foods without flours and rice. she is making gradually changes, including vegetables and low sodium vegetable soup. Will mail out RD contact information card with welcome packet. [FreeTextEntry1] : Patient is a 48 year old female, s/p MIDCAB CESPEDES>LAD on 3/28/23, referred by Dr. Nix for virtual cardiac rehab. Past medical history includes T2 DM, HLD, and hypothyroidism. \par 5/23/23: Patient presents for intake, states she is feeling well, walking for exercise, trying to follow a heart healthy eating plan. She states her left chest incision is healing well.

## 2023-05-23 NOTE — PLAN
[FreeTextEntry1] : Cardiac Rehabilitation Phase 2\par Sessions #1 and #36 to be center-based. Sessions #2-#35 virtual.\par \par Focus assessment and physical exam at session #1\par ITP initiated- confer with Dr. Gaston\par \par All questions answered. \par Welcome packet mailed.\par Start date: 6/13/23 for session #1- patient busy with moving at present, virtual sessions to most likely start in July.\par \par \par

## 2023-05-23 NOTE — REASON FOR VISIT
[Home] : at home, [unfilled] , at the time of the visit. [Medical Office: ([facility])___] : at the medical office located in  [Verbal consent obtained from patient] : the patient, [unfilled] [Assessment] : an assessment [FreeTextEntry1] : ITP to be reviewed and manually signed by Dr. Underwood; ITP to be finalized at session #1; Clinical indication for cardiac rehabilitation: CABG

## 2023-05-23 NOTE — REVIEW OF SYSTEMS
[Negative] : Psychiatric [FreeTextEntry3] : had recent eye exam; states may need to have her new eyeglass prescription adjusted [FreeTextEntry9] : left forearm and left fingers with discomfort

## 2023-05-23 NOTE — HISTORY OF PRESENT ILLNESS
[Type II Diabetes] : Type II Diabetes [Yes ___ How many times per day?] : Yes, [unfilled] times per day [Is Patient aware of signs and symptoms of hypoglycemia and hyperglycemia?] : patient is aware of signs and symptoms of hypoglycemia and hyperglycemia [Hypoglycemia and Hyperglycemia: sign and symptoms] : Hypoglycemia and Hyperglycemia: sign and symptoms [Role of lifestyle behaviors in diabetes management] : role of lifestyle behaviors in diabetes management [Yes, continue with Diabetes plan] : Yes, continue with Diabetes plan [None] : none [Diabetes Mellitus] : diabetes mellitus [Cardiac Rehabilitation] : Cardiac Rehabilitation [___ Days per week] : [unfilled] days per week [Target RPE: ___] : Target RPE: [unfilled] [Treadmill] : treadmill [Recumbent bike] : recumbent bike [Upright exercise bike] : upright exercise bike [BioStep] : BioStep [Elliptical] : elliptical [Upper body ergometer] : upper body ergometer [Stair Climber] : stair climber [Walking] : walking [To increase my time spent in physical activity and/or aerobic exercise] : to increase my time spent in physical activity and/or aerobic exercise [Home exercise] : home exercise [Welcome packet provided] : welcome packet provided [Yes, per exercise prescription, policy] : Yes, per exercise prescription, policy [Self-reports of improved psychosocial well-being] : Self-reports of improved psychosocial well-being [Mindfulness] : mindfulness [Meditation] : meditation [Relaxation breathing techniques] : relaxation breathing techniques [Yes, continue with psychosocial plan] : Yes, continue with psychosocial plan [Diabetes] : Diabetes [Is patient on medication for hyperlipidemia?] : Is patient on medication for hyperlipidemia? Yes [Atorvastatin] : atorvastatin [Is Patient adherent with medication?] : patient is adherent with medication [Discuss an overview of healthy eating plan] : Discuss an overview of healthy eating plan [Yes, continue with nutrition plan] : Yes, continue with nutrition plan [Action] : Stage of change: action [Height: ___] : Height: [unfilled] [Patient will lose 0.5 to 1 lb per week] : Patient will lose 0.5 to 1lb per week [Monitoring of weight at home and at cardiac rehabilitation] : Monitoring of weight at home and at cardiac rehabilitation [Individualized exercise program as developed at cardiac rehabilitation] : Individualized exercise program as developed at cardiac rehabilitation [Calories and healthy weight management] : Calories and healthy weight management [Teach back utilized] : teach back utilized [Yes, continue with Weight Management plan] : Yes, continue with weight management plan [In progress] : in progress [Self-reports of improved health eating patterns] : Self-reports of improved health eating patterns [FreeTextEntry2] : 109 [FreeTextEntry6] : intake: stated weight 178.6 lbs. on 5/3/23; patient states she would like to lose 10-20 lbs. [Angina with exercise] : no angina with exercise [] : no [FreeTextEntry7] : works as , [de-identified] : Sessions #1 and #36 to be center-based. Sessions #2-#35 virtual. [de-identified] : intake: patient has no exercise machines at home; modality will be floor aerobics via video [FreeTextEntry9] : Intake: Patient states she is doing well emotionally, had positive and negative days initially postoperatively. She added she is moving within the next few weeks and would like to start the virtual sessions after she is settled in her new place.  [FreeTextEntry8] : Intake:Patient states she is eating foods without flours and rice. she is making gradually changes, including vegetables and low sodium vegetable soup. Will mail out RD contact information card with welcome packet. [FreeTextEntry1] : Patient is a 48 year old female, s/p MIDCAB CESPEDES>LAD on 3/28/23, referred by Dr. Nix for virtual cardiac rehab. Past medical history includes T2 DM, HLD, and hypothyroidism. \par 5/23/23: Patient presents for intake, states she is feeling well, walking for exercise, trying to follow a heart healthy eating plan. She states her left chest incision is healing well.

## 2023-06-19 NOTE — HISTORY OF PRESENT ILLNESS
[Type II Diabetes] : Type II Diabetes [Yes ___ How many times per day?] : Yes, [unfilled] times per day [Is Patient aware of signs and symptoms of hypoglycemia and hyperglycemia?] : patient is aware of signs and symptoms of hypoglycemia and hyperglycemia [Hypoglycemia and Hyperglycemia: sign and symptoms] : Hypoglycemia and Hyperglycemia: sign and symptoms [Role of lifestyle behaviors in diabetes management] : role of lifestyle behaviors in diabetes management [Yes, continue with Diabetes plan] : Yes, continue with Diabetes plan [FreeTextEntry2] : 109 [Height: ___] : Height: [unfilled] [Patient will lose 0.5 to 1 lb per week] : Patient will lose 0.5 to 1lb per week [Monitoring of weight at home and at cardiac rehabilitation] : Monitoring of weight at home and at cardiac rehabilitation [Individualized exercise program as developed at cardiac rehabilitation] : Individualized exercise program as developed at cardiac rehabilitation [Calories and healthy weight management] : Calories and healthy weight management [Yes, continue with Weight Management plan] : Yes, continue with weight management plan [FreeTextEntry6] : intake: stated weight 178.6 lbs. on 5/3/23; patient states she would like to lose 10-20 lbs. [None] : none [Diabetes Mellitus] : diabetes mellitus [Angina with exercise] : no angina with exercise [] : no [Cardiac Rehabilitation] : Cardiac Rehabilitation [___ Days per week] : [unfilled] days per week [Target RPE: ___] : Target RPE: [unfilled] [Treadmill] : treadmill [Recumbent bike] : recumbent bike [Upright exercise bike] : upright exercise bike [BioStep] : BioStep [Elliptical] : elliptical [Upper body ergometer] : upper body ergometer [Stair Climber] : stair climber [Walking] : walking [To increase my time spent in physical activity and/or aerobic exercise] : to increase my time spent in physical activity and/or aerobic exercise [Home exercise] : home exercise [Welcome packet provided] : welcome packet provided [Yes, per exercise prescription, policy] : Yes, per exercise prescription, policy [FreeTextEntry1] : CT Surgeon: Dr. Nix; cardiologist is Dr. Bryant [FreeTextEntry7] : works as , [de-identified] : Sessions #1 and #36 to be center-based. Sessions #2-#35 virtual. [de-identified] : intake: patient has no exercise machines at home; modality will be floor aerobics via video [Self-reports of improved psychosocial well-being] : Self-reports of improved psychosocial well-being [Mindfulness] : mindfulness [Meditation] : meditation [Relaxation breathing techniques] : relaxation breathing techniques [Yes, continue with psychosocial plan] : Yes, continue with psychosocial plan [FreeTextEntry9] : Intake: Patient states she is doing well emotionally, had positive and negative days initially postoperatively. She added she is moving within the next few weeks and would like to start the virtual sessions after she is settled in her new place.  [Action] : Stage of change: Action [Diabetes] : Diabetes [Is patient on medication for hyperlipidemia?] : Is patient on medication for hyperlipidemia? Yes [Atorvastatin] : atorvastatin [Is Patient adherent with medication?] : patient is adherent with medication [Self-reports of improved health eating patterns] : Self-reports of improved health eating patterns [Discuss an overview of healthy eating plan] : Discuss an overview of healthy eating plan [Teach back utilized] : teach back utilized [Yes, continue with nutrition plan] : Yes, continue with nutrition plan [In progress] : in progress [FreeTextEntry8] : Intake:Patient states she is eating foods without flours and rice. she is making gradually changes, including vegetables and low sodium vegetable soup. Will mail out RD contact information card with welcome packet.

## 2023-06-19 NOTE — REASON FOR VISIT
[Assessment] : an assessment [FreeTextEntry1] : ITP to be reviewed and manually signed by Dr. Underwood; ITP to be finalized at session #1; Clinical indication for cardiac rehabilitation: CABG

## 2023-06-20 ENCOUNTER — NON-APPOINTMENT (OUTPATIENT)
Age: 48
End: 2023-06-20

## 2023-06-20 ENCOUNTER — APPOINTMENT (OUTPATIENT)
Dept: CARDIOLOGY | Facility: CLINIC | Age: 48
End: 2023-06-20

## 2023-08-04 ENCOUNTER — APPOINTMENT (OUTPATIENT)
Dept: INTERNAL MEDICINE | Facility: CLINIC | Age: 48
End: 2023-08-04

## 2023-08-11 ENCOUNTER — MED ADMIN CHARGE (OUTPATIENT)
Age: 48
End: 2023-08-11

## 2023-08-11 ENCOUNTER — APPOINTMENT (OUTPATIENT)
Dept: INTERNAL MEDICINE | Facility: CLINIC | Age: 48
End: 2023-08-11
Payer: MEDICAID

## 2023-08-11 ENCOUNTER — RESULT REVIEW (OUTPATIENT)
Age: 48
End: 2023-08-11

## 2023-08-11 ENCOUNTER — LABORATORY RESULT (OUTPATIENT)
Age: 48
End: 2023-08-11

## 2023-08-11 ENCOUNTER — OUTPATIENT (OUTPATIENT)
Dept: OUTPATIENT SERVICES | Facility: HOSPITAL | Age: 48
LOS: 1 days | End: 2023-08-11
Payer: MEDICAID

## 2023-08-11 VITALS
DIASTOLIC BLOOD PRESSURE: 70 MMHG | OXYGEN SATURATION: 98 % | HEIGHT: 69 IN | SYSTOLIC BLOOD PRESSURE: 122 MMHG | BODY MASS INDEX: 26.81 KG/M2 | HEART RATE: 73 BPM | WEIGHT: 181 LBS

## 2023-08-11 DIAGNOSIS — D64.9 ANEMIA, UNSPECIFIED: ICD-10-CM

## 2023-08-11 DIAGNOSIS — Z98.891 HISTORY OF UTERINE SCAR FROM PREVIOUS SURGERY: Chronic | ICD-10-CM

## 2023-08-11 DIAGNOSIS — Z79.4 LONG TERM (CURRENT) USE OF INSULIN: ICD-10-CM

## 2023-08-11 DIAGNOSIS — Z00.00 ENCOUNTER FOR GENERAL ADULT MEDICAL EXAMINATION WITHOUT ABNORMAL FINDINGS: ICD-10-CM

## 2023-08-11 DIAGNOSIS — Z86.39 PERSONAL HISTORY OF OTHER ENDOCRINE, NUTRITIONAL AND METABOLIC DISEASE: ICD-10-CM

## 2023-08-11 DIAGNOSIS — I10 ESSENTIAL (PRIMARY) HYPERTENSION: ICD-10-CM

## 2023-08-11 DIAGNOSIS — E11.9 TYPE 2 DIABETES MELLITUS WITHOUT COMPLICATIONS: ICD-10-CM

## 2023-08-11 LAB — HBA1C MFR BLD HPLC: 8

## 2023-08-11 PROCEDURE — 85025 COMPLETE CBC W/AUTO DIFF WBC: CPT

## 2023-08-11 PROCEDURE — 80053 COMPREHEN METABOLIC PANEL: CPT

## 2023-08-11 PROCEDURE — 83036 HEMOGLOBIN GLYCOSYLATED A1C: CPT | Mod: QW

## 2023-08-11 PROCEDURE — 83550 IRON BINDING TEST: CPT

## 2023-08-11 PROCEDURE — 82728 ASSAY OF FERRITIN: CPT

## 2023-08-11 PROCEDURE — G0463: CPT | Mod: 25

## 2023-08-11 PROCEDURE — 83036 HEMOGLOBIN GLYCOSYLATED A1C: CPT

## 2023-08-11 PROCEDURE — 80061 LIPID PANEL: CPT

## 2023-08-11 PROCEDURE — 82043 UR ALBUMIN QUANTITATIVE: CPT

## 2023-08-11 PROCEDURE — 84443 ASSAY THYROID STIM HORMONE: CPT

## 2023-08-11 PROCEDURE — 82607 VITAMIN B-12: CPT

## 2023-08-11 PROCEDURE — 99214 OFFICE O/P EST MOD 30 MIN: CPT | Mod: 25

## 2023-08-11 RX ORDER — CLOPIDOGREL BISULFATE 75 MG/1
75 TABLET, FILM COATED ORAL
Qty: 90 | Refills: 3 | Status: DISCONTINUED | COMMUNITY
End: 2023-08-11

## 2023-08-11 NOTE — PHYSICAL EXAM
[No Acute Distress] : no acute distress [Well Developed] : well developed [Normal Sclera/Conjunctiva] : normal sclera/conjunctiva [EOMI] : extraocular movements intact [Normal Outer Ear/Nose] : the outer ears and nose were normal in appearance [Normal TMs] : both tympanic membranes were normal [No Respiratory Distress] : no respiratory distress  [No Accessory Muscle Use] : no accessory muscle use [Clear to Auscultation] : lungs were clear to auscultation bilaterally [Normal Rate] : normal rate  [Regular Rhythm] : with a regular rhythm [No Murmur] : no murmur heard [No Edema] : there was no peripheral edema [Soft] : abdomen soft [Non Tender] : non-tender [No Masses] : no abdominal mass palpated [Grossly Normal Strength/Tone] : grossly normal strength/tone [Coordination Grossly Intact] : coordination grossly intact [Normal Affect] : the affect was normal [Normal Insight/Judgement] : insight and judgment were intact [Comprehensive Foot Exam Normal] : Right and left foot were examined and both feet are normal. No ulcers in either foot. Toes are normal and with full ROM.  Normal tactile sensation with monofilament testing throughout both feet

## 2023-08-12 LAB
ALBUMIN SERPL ELPH-MCNC: 4.5 G/DL
ALP BLD-CCNC: 106 U/L
ALT SERPL-CCNC: 21 U/L
ANION GAP SERPL CALC-SCNC: 11 MMOL/L
AST SERPL-CCNC: 17 U/L
BASOPHILS # BLD AUTO: 0.04 K/UL
BASOPHILS NFR BLD AUTO: 0.6 %
BILIRUB SERPL-MCNC: 0.6 MG/DL
BUN SERPL-MCNC: 11 MG/DL
CALCIUM SERPL-MCNC: 9.1 MG/DL
CHLORIDE SERPL-SCNC: 101 MMOL/L
CHOLEST SERPL-MCNC: 123 MG/DL
CO2 SERPL-SCNC: 22 MMOL/L
CREAT SERPL-MCNC: 0.58 MG/DL
CREAT SPEC-SCNC: 57 MG/DL
EGFR: 113 ML/MIN/1.73M2
EOSINOPHIL # BLD AUTO: 0.05 K/UL
EOSINOPHIL NFR BLD AUTO: 0.7 %
FERRITIN SERPL-MCNC: 54 NG/ML
FOLATE SERPL-MCNC: 9.3 NG/ML
GLUCOSE SERPL-MCNC: 244 MG/DL
HCT VFR BLD CALC: 37.7 %
HDLC SERPL-MCNC: 59 MG/DL
HGB BLD-MCNC: 11.7 G/DL
IMM GRANULOCYTES NFR BLD AUTO: 0.3 %
IRON SATN MFR SERPL: 27 %
IRON SERPL-MCNC: 90 UG/DL
LDLC SERPL CALC-MCNC: 49 MG/DL
LYMPHOCYTES # BLD AUTO: 2.02 K/UL
LYMPHOCYTES NFR BLD AUTO: 30.1 %
MAN DIFF?: NORMAL
MCHC RBC-ENTMCNC: 26.3 PG
MCHC RBC-ENTMCNC: 31 GM/DL
MCV RBC AUTO: 84.7 FL
MICROALBUMIN 24H UR DL<=1MG/L-MCNC: <1.2 MG/DL
MICROALBUMIN/CREAT 24H UR-RTO: NORMAL MG/G
MONOCYTES # BLD AUTO: 0.74 K/UL
MONOCYTES NFR BLD AUTO: 11 %
NEUTROPHILS # BLD AUTO: 3.84 K/UL
NEUTROPHILS NFR BLD AUTO: 57.3 %
NONHDLC SERPL-MCNC: 64 MG/DL
PLATELET # BLD AUTO: 178 K/UL
POTASSIUM SERPL-SCNC: 4.1 MMOL/L
PROT SERPL-MCNC: 7.3 G/DL
RBC # BLD: 4.45 M/UL
RBC # FLD: 15.9 %
SODIUM SERPL-SCNC: 134 MMOL/L
TIBC SERPL-MCNC: 336 UG/DL
TRIGL SERPL-MCNC: 74 MG/DL
TSH SERPL-ACNC: 2.39 UIU/ML
UIBC SERPL-MCNC: 246 UG/DL
VIT B12 SERPL-MCNC: 556 PG/ML
WBC # FLD AUTO: 6.71 K/UL

## 2023-08-12 NOTE — HISTORY OF PRESENT ILLNESS
[FreeTextEntry1] : IVETTE [de-identified] : 48F with T2DM, CAD s/p CABG, HLD, and HTN who presents for NPA.  Came from [facility] in 2022 Used to follow with Dr. Yung Liang [facility] ( 2023) but had to switch due to health insurance reasons. Looking to switch all providers to [facility].   #CAD Presented with migraines and CP, found to have LAD  s/p CABG 3/2023 Unable to start cardiac rehab as unable to schedule with work hours Follows with [facility],  2023 On ASA, lopressor 25 bid, lipitor 80  #T2DM A1c 2023: 7.0 -> 8.0 today Basaglar 32u qhs, lispro 7u qAC; was started on this regimen 3/2023, was previously just on metformin FS: Has had episodes of symptomatic hypoglycemia but not in the last 2 months. AM fasting FS 80-100s, qhs 150-180s. Denies neuropathy, foot wounds. Last saw ophto 2023 but would like to switch to [facility]. Interested in CGM  #Anemia Normocytic anemia, Hgb 9.7 2023 Takes one tab iron daily Having regular periods, now heavier since starting asa.   #HCM Cervical ca: pap smear and HPV wnl 2023 Breast ca: mammogram 2018 reported normal Colon ca: has GI appt Dr Arsenio [place] 2023 Immunizations: S/p 2 dose Pfizer COVID, TDAP (w/n last 10 years), Pneumonia (due)  Walking 30 min try 3 times a week.  Works as a . Lives with  and son. Feels safe at home. Never smoker. Drinks alcohol on special occasions.   Fam hx: T2DM on both sides, CAD on fathers side Surgical hx: CABG,

## 2023-08-12 NOTE — HEALTH RISK ASSESSMENT
[0] : 2) Feeling down, depressed, or hopeless: Not at all (0) [PHQ-2 Negative - No further assessment needed] : PHQ-2 Negative - No further assessment needed [[id number]] : 0

## 2023-08-12 NOTE — ASSESSMENT
[FreeTextEntry1] : 48F with T2DM, CAD s/p CABG, HLD, and HTN who presents for NPA.  #CAD Presented with migraines and CP, found to have LAD  s/p CABG 3/2023 Unable to start cardiac rehab as unable to schedule with work hours Follows with [facility], [place] 5/2023 On ASA, lopressor 25 bid, lipitor 80 - will consider switching from lopressor to ACE/ARB next visit  #T2DM A1c 4/2023: 7.0 -> 8.0 today Basaglar 32u qhs, lispro 7u qAC; was started on this regimen 3/2023, was previously just on metformin FS: Has had episodes of symptomatic hypoglycemia but not in the last 2 months. AM fasting FS 80-100s, qhs 150-180s. - restart metformin 1000 mg bid - Start CGM, sent freestyle to pharm; d/w patient to keep log of FS if delay in CGM - Will consider SGLT2/GLP if persistently hyperglycemic - Urine albumin/cr  #Anemia Normocytic anemia, Hgb 9.7 4/2023 Takes one tab iron daily Having regular periods, now heavier since starting asa.  - repeat cbc, obtain b12 folate and iron studies  #Abnormal thyroid studies TSH elevated in past, no T4 - repeat TFTs  #HCM Cervical ca: pap smear and HPV wnl 6/2023, UTD Breast ca: mammogram 2018 reported normal, ordered today Colon ca: has GI appt Dr Cesar [place] 8/2023 Immunizations: S/p 2 dose Pfizer COVID, TDAP (w/n last 10 years), PCV20 today - CPE labs  RTC 5 weeks

## 2023-09-01 ENCOUNTER — APPOINTMENT (OUTPATIENT)
Dept: CARDIOLOGY | Facility: CLINIC | Age: 48
End: 2023-09-01
Payer: MEDICAID

## 2023-09-01 ENCOUNTER — APPOINTMENT (OUTPATIENT)
Dept: CARDIOTHORACIC SURGERY | Facility: CLINIC | Age: 48
End: 2023-09-01

## 2023-09-01 VITALS
OXYGEN SATURATION: 100 % | HEIGHT: 68 IN | HEART RATE: 62 BPM | WEIGHT: 178 LBS | DIASTOLIC BLOOD PRESSURE: 84 MMHG | BODY MASS INDEX: 26.98 KG/M2 | SYSTOLIC BLOOD PRESSURE: 133 MMHG

## 2023-09-01 VITALS — DIASTOLIC BLOOD PRESSURE: 71 MMHG | SYSTOLIC BLOOD PRESSURE: 111 MMHG

## 2023-09-01 PROCEDURE — 93000 ELECTROCARDIOGRAM COMPLETE: CPT

## 2023-09-01 PROCEDURE — 99214 OFFICE O/P EST MOD 30 MIN: CPT | Mod: 25

## 2023-09-15 ENCOUNTER — OUTPATIENT (OUTPATIENT)
Dept: OUTPATIENT SERVICES | Facility: HOSPITAL | Age: 48
LOS: 1 days | End: 2023-09-15
Payer: MEDICAID

## 2023-09-15 ENCOUNTER — APPOINTMENT (OUTPATIENT)
Dept: INTERNAL MEDICINE | Facility: CLINIC | Age: 48
End: 2023-09-15
Payer: MEDICAID

## 2023-09-15 ENCOUNTER — MED ADMIN CHARGE (OUTPATIENT)
Age: 48
End: 2023-09-15

## 2023-09-15 VITALS — SYSTOLIC BLOOD PRESSURE: 116 MMHG | DIASTOLIC BLOOD PRESSURE: 70 MMHG | OXYGEN SATURATION: 99 % | HEART RATE: 78 BPM

## 2023-09-15 DIAGNOSIS — I10 ESSENTIAL (PRIMARY) HYPERTENSION: ICD-10-CM

## 2023-09-15 DIAGNOSIS — Z23 ENCOUNTER FOR IMMUNIZATION: ICD-10-CM

## 2023-09-15 DIAGNOSIS — Z98.891 HISTORY OF UTERINE SCAR FROM PREVIOUS SURGERY: Chronic | ICD-10-CM

## 2023-09-15 PROCEDURE — 99213 OFFICE O/P EST LOW 20 MIN: CPT

## 2023-09-15 PROCEDURE — G0463: CPT

## 2023-09-15 PROCEDURE — G0008: CPT

## 2023-09-15 RX ORDER — METFORMIN HYDROCHLORIDE 1000 MG/1
1000 TABLET, COATED ORAL
Qty: 60 | Refills: 1 | Status: DISCONTINUED | COMMUNITY
Start: 2023-08-11 | End: 2023-09-15

## 2023-09-19 ENCOUNTER — OUTPATIENT (OUTPATIENT)
Dept: OUTPATIENT SERVICES | Facility: HOSPITAL | Age: 48
LOS: 1 days | End: 2023-09-19

## 2023-09-19 ENCOUNTER — APPOINTMENT (OUTPATIENT)
Dept: INTERNAL MEDICINE | Facility: CLINIC | Age: 48
End: 2023-09-19
Payer: MEDICAID

## 2023-09-19 DIAGNOSIS — Z95.1 PRESENCE OF AORTOCORONARY BYPASS GRAFT: ICD-10-CM

## 2023-09-19 DIAGNOSIS — Z23 ENCOUNTER FOR IMMUNIZATION: ICD-10-CM

## 2023-09-19 DIAGNOSIS — Z79.4 LONG TERM (CURRENT) USE OF INSULIN: ICD-10-CM

## 2023-09-19 DIAGNOSIS — E11.9 TYPE 2 DIABETES MELLITUS WITHOUT COMPLICATIONS: ICD-10-CM

## 2023-09-19 DIAGNOSIS — I10 ESSENTIAL (PRIMARY) HYPERTENSION: ICD-10-CM

## 2023-09-19 DIAGNOSIS — Z98.891 HISTORY OF UTERINE SCAR FROM PREVIOUS SURGERY: Chronic | ICD-10-CM

## 2023-09-19 PROCEDURE — 97802 MEDICAL NUTRITION INDIV IN: CPT

## 2023-09-30 ENCOUNTER — OUTPATIENT (OUTPATIENT)
Dept: OUTPATIENT SERVICES | Facility: HOSPITAL | Age: 48
LOS: 1 days | End: 2023-09-30
Payer: MEDICAID

## 2023-09-30 ENCOUNTER — RESULT REVIEW (OUTPATIENT)
Age: 48
End: 2023-09-30

## 2023-09-30 ENCOUNTER — APPOINTMENT (OUTPATIENT)
Dept: MAMMOGRAPHY | Facility: IMAGING CENTER | Age: 48
End: 2023-09-30
Payer: MEDICAID

## 2023-09-30 DIAGNOSIS — Z98.891 HISTORY OF UTERINE SCAR FROM PREVIOUS SURGERY: Chronic | ICD-10-CM

## 2023-09-30 DIAGNOSIS — Z00.00 ENCOUNTER FOR GENERAL ADULT MEDICAL EXAMINATION WITHOUT ABNORMAL FINDINGS: ICD-10-CM

## 2023-09-30 PROCEDURE — 77067 SCR MAMMO BI INCL CAD: CPT

## 2023-09-30 PROCEDURE — 77067 SCR MAMMO BI INCL CAD: CPT | Mod: 26

## 2023-09-30 PROCEDURE — 77063 BREAST TOMOSYNTHESIS BI: CPT | Mod: 26

## 2023-09-30 PROCEDURE — 77063 BREAST TOMOSYNTHESIS BI: CPT

## 2023-10-02 ENCOUNTER — NON-APPOINTMENT (OUTPATIENT)
Age: 48
End: 2023-10-02

## 2023-10-13 ENCOUNTER — RESULT REVIEW (OUTPATIENT)
Age: 48
End: 2023-10-13

## 2023-10-13 ENCOUNTER — APPOINTMENT (OUTPATIENT)
Dept: ULTRASOUND IMAGING | Facility: IMAGING CENTER | Age: 48
End: 2023-10-13
Payer: MEDICAID

## 2023-10-13 ENCOUNTER — OUTPATIENT (OUTPATIENT)
Dept: OUTPATIENT SERVICES | Facility: HOSPITAL | Age: 48
LOS: 1 days | End: 2023-10-13
Payer: MEDICAID

## 2023-10-13 DIAGNOSIS — Z00.8 ENCOUNTER FOR OTHER GENERAL EXAMINATION: ICD-10-CM

## 2023-10-13 DIAGNOSIS — Z98.891 HISTORY OF UTERINE SCAR FROM PREVIOUS SURGERY: Chronic | ICD-10-CM

## 2023-10-13 PROCEDURE — 76641 ULTRASOUND BREAST COMPLETE: CPT | Mod: 26,50,76

## 2023-10-13 PROCEDURE — 76641 ULTRASOUND BREAST COMPLETE: CPT

## 2023-10-18 ENCOUNTER — RESULT REVIEW (OUTPATIENT)
Age: 48
End: 2023-10-18

## 2023-10-19 ENCOUNTER — OUTPATIENT (OUTPATIENT)
Dept: OUTPATIENT SERVICES | Facility: HOSPITAL | Age: 48
LOS: 1 days | End: 2023-10-19
Payer: MEDICAID

## 2023-10-19 ENCOUNTER — APPOINTMENT (OUTPATIENT)
Dept: INTERNAL MEDICINE | Facility: CLINIC | Age: 48
End: 2023-10-19
Payer: MEDICAID

## 2023-10-19 VITALS
HEIGHT: 68 IN | BODY MASS INDEX: 27.13 KG/M2 | DIASTOLIC BLOOD PRESSURE: 82 MMHG | HEART RATE: 79 BPM | OXYGEN SATURATION: 98 % | SYSTOLIC BLOOD PRESSURE: 118 MMHG | WEIGHT: 179 LBS

## 2023-10-19 DIAGNOSIS — I10 ESSENTIAL (PRIMARY) HYPERTENSION: ICD-10-CM

## 2023-10-19 DIAGNOSIS — Z98.891 HISTORY OF UTERINE SCAR FROM PREVIOUS SURGERY: Chronic | ICD-10-CM

## 2023-10-19 PROCEDURE — G0463: CPT

## 2023-10-19 PROCEDURE — 99213 OFFICE O/P EST LOW 20 MIN: CPT | Mod: GE

## 2023-10-19 RX ORDER — METOPROLOL TARTRATE 25 MG/1
25 TABLET, FILM COATED ORAL TWICE DAILY
Qty: 15 | Refills: 0 | Status: DISCONTINUED | COMMUNITY
Start: 2023-04-10 | End: 2023-10-19

## 2023-10-20 RX ORDER — INSULIN GLARGINE 100 [IU]/ML
100 INJECTION, SOLUTION SUBCUTANEOUS AT BEDTIME
Refills: 0 | Status: DISCONTINUED | COMMUNITY
End: 2023-10-20

## 2023-10-20 RX ORDER — INSULIN LISPRO 100 [IU]/ML
100 INJECTION, SOLUTION INTRAVENOUS; SUBCUTANEOUS 3 TIMES DAILY
Refills: 0 | Status: DISCONTINUED | COMMUNITY
End: 2023-10-20

## 2023-10-20 RX ORDER — ELECTROLYTES/DEXTROSE
32G X 4 MM SOLUTION, ORAL ORAL
Qty: 1 | Refills: 3 | Status: ACTIVE | COMMUNITY
Start: 2023-10-20 | End: 1900-01-01

## 2023-10-23 DIAGNOSIS — E03.9 HYPOTHYROIDISM, UNSPECIFIED: ICD-10-CM

## 2023-10-23 DIAGNOSIS — Z79.4 LONG TERM (CURRENT) USE OF INSULIN: ICD-10-CM

## 2023-10-23 DIAGNOSIS — E11.9 TYPE 2 DIABETES MELLITUS WITHOUT COMPLICATIONS: ICD-10-CM

## 2023-10-23 DIAGNOSIS — Z00.00 ENCOUNTER FOR GENERAL ADULT MEDICAL EXAMINATION WITHOUT ABNORMAL FINDINGS: ICD-10-CM

## 2023-10-24 ENCOUNTER — APPOINTMENT (OUTPATIENT)
Age: 48
End: 2023-10-24

## 2023-10-27 ENCOUNTER — OUTPATIENT (OUTPATIENT)
Dept: OUTPATIENT SERVICES | Facility: HOSPITAL | Age: 48
LOS: 1 days | End: 2023-10-27
Payer: MEDICAID

## 2023-10-27 ENCOUNTER — RESULT REVIEW (OUTPATIENT)
Age: 48
End: 2023-10-27

## 2023-10-27 ENCOUNTER — APPOINTMENT (OUTPATIENT)
Dept: ULTRASOUND IMAGING | Facility: IMAGING CENTER | Age: 48
End: 2023-10-27
Payer: MEDICAID

## 2023-10-27 DIAGNOSIS — Z98.891 HISTORY OF UTERINE SCAR FROM PREVIOUS SURGERY: Chronic | ICD-10-CM

## 2023-10-27 DIAGNOSIS — Z00.8 ENCOUNTER FOR OTHER GENERAL EXAMINATION: ICD-10-CM

## 2023-10-27 DIAGNOSIS — Z00.00 ENCOUNTER FOR GENERAL ADULT MEDICAL EXAMINATION WITHOUT ABNORMAL FINDINGS: ICD-10-CM

## 2023-10-27 PROCEDURE — 88360 TUMOR IMMUNOHISTOCHEM/MANUAL: CPT

## 2023-10-27 PROCEDURE — 88305 TISSUE EXAM BY PATHOLOGIST: CPT

## 2023-10-27 PROCEDURE — 88360 TUMOR IMMUNOHISTOCHEM/MANUAL: CPT | Mod: 26

## 2023-10-27 PROCEDURE — 19083 BX BREAST 1ST LESION US IMAG: CPT

## 2023-10-27 PROCEDURE — 88305 TISSUE EXAM BY PATHOLOGIST: CPT | Mod: 26

## 2023-10-27 PROCEDURE — 77065 DX MAMMO INCL CAD UNI: CPT | Mod: 26,RT

## 2023-10-27 PROCEDURE — 77065 DX MAMMO INCL CAD UNI: CPT

## 2023-10-27 PROCEDURE — A4648: CPT

## 2023-10-27 PROCEDURE — 19083 BX BREAST 1ST LESION US IMAG: CPT | Mod: RT

## 2023-11-03 ENCOUNTER — NON-APPOINTMENT (OUTPATIENT)
Age: 48
End: 2023-11-03

## 2023-11-04 ENCOUNTER — NON-APPOINTMENT (OUTPATIENT)
Age: 48
End: 2023-11-04

## 2023-11-07 ENCOUNTER — APPOINTMENT (OUTPATIENT)
Dept: SURGICAL ONCOLOGY | Facility: CLINIC | Age: 48
End: 2023-11-07
Payer: MEDICAID

## 2023-11-07 ENCOUNTER — TRANSCRIPTION ENCOUNTER (OUTPATIENT)
Age: 48
End: 2023-11-07

## 2023-11-07 VITALS
WEIGHT: 177 LBS | HEART RATE: 85 BPM | BODY MASS INDEX: 28.45 KG/M2 | RESPIRATION RATE: 17 BRPM | HEIGHT: 66 IN | OXYGEN SATURATION: 99 % | SYSTOLIC BLOOD PRESSURE: 118 MMHG | DIASTOLIC BLOOD PRESSURE: 80 MMHG

## 2023-11-07 PROCEDURE — 99205 OFFICE O/P NEW HI 60 MIN: CPT

## 2023-11-07 PROCEDURE — 99215 OFFICE O/P EST HI 40 MIN: CPT

## 2023-11-22 ENCOUNTER — NON-APPOINTMENT (OUTPATIENT)
Age: 48
End: 2023-11-22

## 2023-11-22 ENCOUNTER — APPOINTMENT (OUTPATIENT)
Dept: INTERNAL MEDICINE | Facility: CLINIC | Age: 48
End: 2023-11-22
Payer: MEDICAID

## 2023-11-22 ENCOUNTER — OUTPATIENT (OUTPATIENT)
Dept: OUTPATIENT SERVICES | Facility: HOSPITAL | Age: 48
LOS: 1 days | End: 2023-11-22
Payer: MEDICAID

## 2023-11-22 VITALS
HEIGHT: 66 IN | WEIGHT: 175 LBS | BODY MASS INDEX: 28.12 KG/M2 | HEART RATE: 85 BPM | SYSTOLIC BLOOD PRESSURE: 118 MMHG | OXYGEN SATURATION: 99 % | DIASTOLIC BLOOD PRESSURE: 70 MMHG

## 2023-11-22 DIAGNOSIS — I10 ESSENTIAL (PRIMARY) HYPERTENSION: ICD-10-CM

## 2023-11-22 DIAGNOSIS — Z00.00 ENCOUNTER FOR GENERAL ADULT MEDICAL EXAMINATION W/OUT ABNORMAL FINDINGS: ICD-10-CM

## 2023-11-22 DIAGNOSIS — Z98.891 HISTORY OF UTERINE SCAR FROM PREVIOUS SURGERY: Chronic | ICD-10-CM

## 2023-11-22 LAB — HBA1C MFR BLD HPLC: 7.4

## 2023-11-22 PROCEDURE — 99213 OFFICE O/P EST LOW 20 MIN: CPT | Mod: GE

## 2023-11-22 PROCEDURE — 80053 COMPREHEN METABOLIC PANEL: CPT

## 2023-11-22 PROCEDURE — 85025 COMPLETE CBC W/AUTO DIFF WBC: CPT

## 2023-11-22 PROCEDURE — G0463: CPT

## 2023-11-22 PROCEDURE — 83036 HEMOGLOBIN GLYCOSYLATED A1C: CPT

## 2023-11-22 RX ORDER — INSULIN LISPRO 100 U/ML
100 INJECTION, SOLUTION SUBCUTANEOUS
Qty: 5 | Refills: 0 | Status: DISCONTINUED | COMMUNITY
Start: 2023-10-20 | End: 2023-11-22

## 2023-11-22 RX ORDER — DULAGLUTIDE 0.75 MG/.5ML
0.75 INJECTION, SOLUTION SUBCUTANEOUS
Qty: 4 | Refills: 1 | Status: COMPLETED | COMMUNITY
Start: 2023-10-19 | End: 2023-11-22

## 2023-11-22 RX ORDER — INSULIN GLARGINE 100 [IU]/ML
100 INJECTION, SOLUTION SUBCUTANEOUS
Qty: 5 | Refills: 0 | Status: COMPLETED | COMMUNITY
Start: 2023-10-20 | End: 2023-11-22

## 2023-11-24 ENCOUNTER — APPOINTMENT (OUTPATIENT)
Dept: MRI IMAGING | Facility: IMAGING CENTER | Age: 48
End: 2023-11-24
Payer: MEDICAID

## 2023-11-24 ENCOUNTER — OUTPATIENT (OUTPATIENT)
Dept: OUTPATIENT SERVICES | Facility: HOSPITAL | Age: 48
LOS: 1 days | End: 2023-11-24
Payer: MEDICAID

## 2023-11-24 DIAGNOSIS — C50.919 MALIGNANT NEOPLASM OF UNSPECIFIED SITE OF UNSPECIFIED FEMALE BREAST: ICD-10-CM

## 2023-11-24 DIAGNOSIS — Z98.891 HISTORY OF UTERINE SCAR FROM PREVIOUS SURGERY: Chronic | ICD-10-CM

## 2023-11-24 LAB
ALBUMIN SERPL ELPH-MCNC: 4.6 G/DL
ALP BLD-CCNC: 86 U/L
ALT SERPL-CCNC: 30 U/L
ANION GAP SERPL CALC-SCNC: 12 MMOL/L
AST SERPL-CCNC: 25 U/L
BASOPHILS # BLD AUTO: 0.08 K/UL
BASOPHILS NFR BLD AUTO: 0.9 %
BILIRUB SERPL-MCNC: 0.5 MG/DL
BUN SERPL-MCNC: 11 MG/DL
CALCIUM SERPL-MCNC: 9.9 MG/DL
CHLORIDE SERPL-SCNC: 102 MMOL/L
CO2 SERPL-SCNC: 26 MMOL/L
CREAT SERPL-MCNC: 0.6 MG/DL
EGFR: 112 ML/MIN/1.73M2
EOSINOPHIL # BLD AUTO: 0.12 K/UL
EOSINOPHIL NFR BLD AUTO: 1.3 %
GLUCOSE SERPL-MCNC: 166 MG/DL
HCT VFR BLD CALC: 35.2 %
HGB BLD-MCNC: 11.2 G/DL
IMM GRANULOCYTES NFR BLD AUTO: 0.3 %
LYMPHOCYTES # BLD AUTO: 3.08 K/UL
LYMPHOCYTES NFR BLD AUTO: 33.7 %
MAN DIFF?: NORMAL
MCHC RBC-ENTMCNC: 28.9 PG
MCHC RBC-ENTMCNC: 31.8 GM/DL
MCV RBC AUTO: 90.7 FL
MONOCYTES # BLD AUTO: 0.58 K/UL
MONOCYTES NFR BLD AUTO: 6.3 %
NEUTROPHILS # BLD AUTO: 5.26 K/UL
NEUTROPHILS NFR BLD AUTO: 57.5 %
PLATELET # BLD AUTO: 253 K/UL
POTASSIUM SERPL-SCNC: 4.1 MMOL/L
PROT SERPL-MCNC: 7.5 G/DL
RBC # BLD: 3.88 M/UL
RBC # FLD: 13.4 %
SODIUM SERPL-SCNC: 139 MMOL/L
WBC # FLD AUTO: 9.15 K/UL

## 2023-11-24 PROCEDURE — A9585: CPT

## 2023-11-24 PROCEDURE — C8937: CPT

## 2023-11-24 PROCEDURE — C8908: CPT

## 2023-11-24 PROCEDURE — 77049 MRI BREAST C-+ W/CAD BI: CPT | Mod: 26

## 2023-11-27 DIAGNOSIS — Z00.00 ENCOUNTER FOR GENERAL ADULT MEDICAL EXAMINATION WITHOUT ABNORMAL FINDINGS: ICD-10-CM

## 2023-11-27 DIAGNOSIS — Z79.4 LONG TERM (CURRENT) USE OF INSULIN: ICD-10-CM

## 2023-11-27 DIAGNOSIS — C50.919 MALIGNANT NEOPLASM OF UNSPECIFIED SITE OF UNSPECIFIED FEMALE BREAST: ICD-10-CM

## 2023-11-27 DIAGNOSIS — E11.9 TYPE 2 DIABETES MELLITUS WITHOUT COMPLICATIONS: ICD-10-CM

## 2023-11-30 ENCOUNTER — OUTPATIENT (OUTPATIENT)
Dept: OUTPATIENT SERVICES | Facility: HOSPITAL | Age: 48
LOS: 1 days | End: 2023-11-30

## 2023-11-30 VITALS
WEIGHT: 173.94 LBS | OXYGEN SATURATION: 98 % | HEART RATE: 97 BPM | SYSTOLIC BLOOD PRESSURE: 110 MMHG | TEMPERATURE: 97 F | RESPIRATION RATE: 14 BRPM | DIASTOLIC BLOOD PRESSURE: 78 MMHG | HEIGHT: 65 IN

## 2023-11-30 DIAGNOSIS — C50.919 MALIGNANT NEOPLASM OF UNSPECIFIED SITE OF UNSPECIFIED FEMALE BREAST: ICD-10-CM

## 2023-11-30 DIAGNOSIS — Z98.891 HISTORY OF UTERINE SCAR FROM PREVIOUS SURGERY: Chronic | ICD-10-CM

## 2023-11-30 DIAGNOSIS — Z95.1 PRESENCE OF AORTOCORONARY BYPASS GRAFT: Chronic | ICD-10-CM

## 2023-11-30 LAB
A1C WITH ESTIMATED AVERAGE GLUCOSE RESULT: 7.1 % — HIGH (ref 4–5.6)
A1C WITH ESTIMATED AVERAGE GLUCOSE RESULT: 7.1 % — HIGH (ref 4–5.6)
ANION GAP SERPL CALC-SCNC: 12 MMOL/L — SIGNIFICANT CHANGE UP (ref 7–14)
ANION GAP SERPL CALC-SCNC: 12 MMOL/L — SIGNIFICANT CHANGE UP (ref 7–14)
BUN SERPL-MCNC: 13 MG/DL — SIGNIFICANT CHANGE UP (ref 7–23)
BUN SERPL-MCNC: 13 MG/DL — SIGNIFICANT CHANGE UP (ref 7–23)
CALCIUM SERPL-MCNC: 8.8 MG/DL — SIGNIFICANT CHANGE UP (ref 8.4–10.5)
CALCIUM SERPL-MCNC: 8.8 MG/DL — SIGNIFICANT CHANGE UP (ref 8.4–10.5)
CHLORIDE SERPL-SCNC: 100 MMOL/L — SIGNIFICANT CHANGE UP (ref 98–107)
CHLORIDE SERPL-SCNC: 100 MMOL/L — SIGNIFICANT CHANGE UP (ref 98–107)
CO2 SERPL-SCNC: 26 MMOL/L — SIGNIFICANT CHANGE UP (ref 22–31)
CO2 SERPL-SCNC: 26 MMOL/L — SIGNIFICANT CHANGE UP (ref 22–31)
CREAT SERPL-MCNC: 0.55 MG/DL — SIGNIFICANT CHANGE UP (ref 0.5–1.3)
CREAT SERPL-MCNC: 0.55 MG/DL — SIGNIFICANT CHANGE UP (ref 0.5–1.3)
EGFR: 114 ML/MIN/1.73M2 — SIGNIFICANT CHANGE UP
EGFR: 114 ML/MIN/1.73M2 — SIGNIFICANT CHANGE UP
ESTIMATED AVERAGE GLUCOSE: 157 — SIGNIFICANT CHANGE UP
ESTIMATED AVERAGE GLUCOSE: 157 — SIGNIFICANT CHANGE UP
GLUCOSE SERPL-MCNC: 182 MG/DL — HIGH (ref 70–99)
GLUCOSE SERPL-MCNC: 182 MG/DL — HIGH (ref 70–99)
HCG UR QL: NEGATIVE — SIGNIFICANT CHANGE UP
HCG UR QL: NEGATIVE — SIGNIFICANT CHANGE UP
HCT VFR BLD CALC: 32.9 % — LOW (ref 34.5–45)
HCT VFR BLD CALC: 32.9 % — LOW (ref 34.5–45)
HGB BLD-MCNC: 10.3 G/DL — LOW (ref 11.5–15.5)
HGB BLD-MCNC: 10.3 G/DL — LOW (ref 11.5–15.5)
MCHC RBC-ENTMCNC: 27.5 PG — SIGNIFICANT CHANGE UP (ref 27–34)
MCHC RBC-ENTMCNC: 27.5 PG — SIGNIFICANT CHANGE UP (ref 27–34)
MCHC RBC-ENTMCNC: 31.3 GM/DL — LOW (ref 32–36)
MCHC RBC-ENTMCNC: 31.3 GM/DL — LOW (ref 32–36)
MCV RBC AUTO: 87.7 FL — SIGNIFICANT CHANGE UP (ref 80–100)
MCV RBC AUTO: 87.7 FL — SIGNIFICANT CHANGE UP (ref 80–100)
NRBC # BLD: 0 /100 WBCS — SIGNIFICANT CHANGE UP (ref 0–0)
NRBC # BLD: 0 /100 WBCS — SIGNIFICANT CHANGE UP (ref 0–0)
NRBC # FLD: 0 K/UL — SIGNIFICANT CHANGE UP (ref 0–0)
NRBC # FLD: 0 K/UL — SIGNIFICANT CHANGE UP (ref 0–0)
PLATELET # BLD AUTO: 227 K/UL — SIGNIFICANT CHANGE UP (ref 150–400)
PLATELET # BLD AUTO: 227 K/UL — SIGNIFICANT CHANGE UP (ref 150–400)
POTASSIUM SERPL-MCNC: 4 MMOL/L — SIGNIFICANT CHANGE UP (ref 3.5–5.3)
POTASSIUM SERPL-MCNC: 4 MMOL/L — SIGNIFICANT CHANGE UP (ref 3.5–5.3)
POTASSIUM SERPL-SCNC: 4 MMOL/L — SIGNIFICANT CHANGE UP (ref 3.5–5.3)
POTASSIUM SERPL-SCNC: 4 MMOL/L — SIGNIFICANT CHANGE UP (ref 3.5–5.3)
RBC # BLD: 3.75 M/UL — LOW (ref 3.8–5.2)
RBC # BLD: 3.75 M/UL — LOW (ref 3.8–5.2)
RBC # FLD: 13.2 % — SIGNIFICANT CHANGE UP (ref 10.3–14.5)
RBC # FLD: 13.2 % — SIGNIFICANT CHANGE UP (ref 10.3–14.5)
SODIUM SERPL-SCNC: 138 MMOL/L — SIGNIFICANT CHANGE UP (ref 135–145)
SODIUM SERPL-SCNC: 138 MMOL/L — SIGNIFICANT CHANGE UP (ref 135–145)
WBC # BLD: 7.82 K/UL — SIGNIFICANT CHANGE UP (ref 3.8–10.5)
WBC # BLD: 7.82 K/UL — SIGNIFICANT CHANGE UP (ref 3.8–10.5)
WBC # FLD AUTO: 7.82 K/UL — SIGNIFICANT CHANGE UP (ref 3.8–10.5)
WBC # FLD AUTO: 7.82 K/UL — SIGNIFICANT CHANGE UP (ref 3.8–10.5)

## 2023-11-30 RX ORDER — SODIUM CHLORIDE 9 MG/ML
1000 INJECTION, SOLUTION INTRAVENOUS
Refills: 0 | Status: DISCONTINUED | OUTPATIENT
Start: 2023-12-06 | End: 2023-12-20

## 2023-11-30 NOTE — H&P PST ADULT - WHEN WAS YOUR LAST VACCINATION? YEAR
Impression: Central retinal vein occlusion w/ macular edema, right eye Plan: Repeat IVT OD for recurrent CRVO ME OD  4w OCT/exam OU 2022

## 2023-11-30 NOTE — H&P PST ADULT - GASTROINTESTINAL
negative normal/soft/nontender/nondistended/normal active bowel sounds/no guarding/no rigidity/no organomegaly/no palpable eduardo/no masses palpable

## 2023-11-30 NOTE — H&P PST ADULT - NSICDXPASTMEDICALHX_GEN_ALL_CORE_FT
PAST MEDICAL HISTORY:  CAD (coronary artery disease)     Hyperlipidemia     Hypertension     Malignant neoplasm of breast     Type 2 diabetes mellitus

## 2023-11-30 NOTE — H&P PST ADULT - HISTORY OF PRESENT ILLNESS
47y/o female scheduled for right breast paige localized partial mastectomy, right axillary sentinel lymph node biopsy, possible tissue transfer on 12/6/2023.  Pt states, "recent mammogram shows mass in right breast, bx positive for malignancy." 49y/o female scheduled for right breast paige localized partial mastectomy, right axillary sentinel lymph node biopsy, possible tissue transfer on 12/6/2023.  Pt states, "recent mammogram shows mass in right breast, bx positive for malignancy."

## 2023-11-30 NOTE — H&P PST ADULT - CARDIOVASCULAR COMMENTS
end of day has left ankle swelling, s/p cabg X1 3/28/2023 on plavix and asa, able to climb a flight of stairs

## 2023-11-30 NOTE — H&P PST ADULT - PROBLEM SELECTOR PLAN 1
Pt scheduled for right breast paige localized partial mastectomy, right axillary sentinel lymph node biopsy, possible tissue transfer on 12/6/2023.  labs done results pending, ekg in chart.  Urine cup provided.  Chlorhexidine provided-  written and verbal instructions given, pt able to verbalize understanding.  Preop teaching done, pt able to verbalize understanding.   medication day of procedure-  losartan, famotidine   cad on plavix and asa   pst request   pt scheduled for cardiology eval 12/1/2023 9am- Dr. Ryan Bryant cardiologist [phone]  echo 2023 in chart Pt scheduled for right breast paige localized partial mastectomy, right axillary sentinel lymph node biopsy, possible tissue transfer on 12/6/2023.  labs done results pending, ekg in chart.  Urine cup provided.  Chlorhexidine provided-  written and verbal instructions given, pt able to verbalize understanding.  Preop teaching done, pt able to verbalize understanding.   medication day of procedure-  losartan, famotidine   cad on plavix and asa   pst request   pt scheduled for cardiology eval 12/1/2023 9am- Dr. Ryan Bryant cardiologist [phone]  echo 2023 in chart Pt scheduled for right breast paige localized partial mastectomy, right axillary sentinel lymph node biopsy, possible tissue transfer on 12/6/2023.  labs done results pending, ekg in chart.  Urine cup provided.  Chlorhexidine provided-  written and verbal instructions given, pt able to verbalize understanding.  Preop teaching done, pt able to verbalize understanding.   medication day of procedure-  losartan, famotidine   cad on plavix and asa   pst request   pt scheduled for cardiology eval 12/1/2023 9am- Dr. Ryan Bryant cardiologist [phone]  echo 2023 in chart Pt scheduled for right breast paige localized partial mastectomy, right axillary sentinel lymph node biopsy, possible tissue transfer on 12/6/2023.  labs done results pending, ekg in chart.  Urine cup provided.  Chlorhexidine provided-  written and verbal instructions given, pt able to verbalize understanding.  Preop teaching done, pt able to verbalize understanding.   medication day of procedure-  losartan, famotidine   dm- no dm medication day of procedure, Trulicity last dose 11/22/2023   cad on plavix and asa   pst request   pt scheduled for cardiology eval 12/1/2023 9am- Dr. Ryan Bryant cardiologist [phone]  echo 2023 in chart Pt scheduled for right breast paige localized partial mastectomy, right axillary sentinel lymph node biopsy, possible tissue transfer on 12/6/2023.  labs done results pending, ekg in chart.  Urine cup provided.  Chlorhexidine provided-  written and verbal instructions given, pt able to verbalize understanding.  Preop teaching done, pt able to verbalize understanding.   medication day of procedure-  losartan, famotidine   dm- no dm medication day of procedure, Trulicity last dose 11/22/2023   cad on plavix and asa   pst request   pt scheduled for cardiology eval 12/1/2023 9am- Dr. Ryan Bryant cardiologist [phone]  echo 2023 in chart Pt scheduled for right breast paige localized partial mastectomy, right axillary sentinel lymph node biopsy, possible tissue transfer on 12/6/2023.  labs done results pending, ekg in chart.  Urine cup provided.  Chlorhexidine provided-  written and verbal instructions given, pt able to verbalize understanding.  Preop teaching done, pt able to verbalize understanding.   medication day of procedure-  losartan, famotidine   dm- no dm medication day of procedure, Trulicity last dose 11/22/2023   cad on plavix and asa   pst request   pt scheduled for cardiology eval 12/1/2023 9am- Dr. Ryan Bryant cardiologist [phone]  echo 2023 in chart Pt scheduled for right breast paige localized partial mastectomy, right axillary sentinel lymph node biopsy, possible tissue transfer on 12/6/2023.  labs done results pending, ekg in chart.  Urine cup provided.  Chlorhexidine provided-  written and verbal instructions given, pt able to verbalize understanding.  Preop teaching done, pt able to verbalize understanding.   medication day of procedure-  losartan, famotidine   dm- no dm medication day of procedure, Trulicity last dose 11/22/2023   cad on plavix and asa -  message left for Dr. Bryant for plavix instructions, pt instructed to f/u   pst request   medical eval in chart  echo 2023

## 2023-11-30 NOTE — H&P PST ADULT - OTHER CARE PROVIDERS
Dr. Ryan Bryant cardiologist [phone] Dr. Ryan Bryant cardiologist [phone] Dr. Ryan Bryant cardiologist [phone]

## 2023-12-01 ENCOUNTER — OUTPATIENT (OUTPATIENT)
Dept: OUTPATIENT SERVICES | Facility: HOSPITAL | Age: 48
LOS: 1 days | End: 2023-12-01
Payer: MEDICAID

## 2023-12-01 ENCOUNTER — APPOINTMENT (OUTPATIENT)
Dept: ULTRASOUND IMAGING | Facility: IMAGING CENTER | Age: 48
End: 2023-12-01
Payer: MEDICAID

## 2023-12-01 DIAGNOSIS — Z95.1 PRESENCE OF AORTOCORONARY BYPASS GRAFT: Chronic | ICD-10-CM

## 2023-12-01 DIAGNOSIS — Z98.891 HISTORY OF UTERINE SCAR FROM PREVIOUS SURGERY: Chronic | ICD-10-CM

## 2023-12-01 DIAGNOSIS — C50.919 MALIGNANT NEOPLASM OF UNSPECIFIED SITE OF UNSPECIFIED FEMALE BREAST: ICD-10-CM

## 2023-12-01 PROBLEM — E11.9 TYPE 2 DIABETES MELLITUS WITHOUT COMPLICATIONS: Chronic | Status: ACTIVE | Noted: 2023-11-30

## 2023-12-01 PROCEDURE — 19285 PERQ DEV BREAST 1ST US IMAG: CPT

## 2023-12-01 PROCEDURE — 19285 PERQ DEV BREAST 1ST US IMAG: CPT | Mod: RT

## 2023-12-01 PROCEDURE — C1739: CPT

## 2023-12-05 ENCOUNTER — TRANSCRIPTION ENCOUNTER (OUTPATIENT)
Age: 48
End: 2023-12-05

## 2023-12-05 NOTE — ASU PATIENT PROFILE, ADULT - VISION (WITH CORRECTIVE LENSES IF THE PATIENT USUALLY WEARS THEM):
Well appearing, awake, alert, oriented to person, place, time/situation and in no apparent distress. normal... Normal vision: sees adequately in most situations; can see medication labels, newsprint

## 2023-12-05 NOTE — ASU PATIENT PROFILE, ADULT - FALL HARM RISK - UNIVERSAL INTERVENTIONS
Bed in lowest position, wheels locked, appropriate side rails in place/Call bell, personal items and telephone in reach/Instruct patient to call for assistance before getting out of bed or chair/Non-slip footwear when patient is out of bed/[place] to call system/Physically safe environment - no spills, clutter or unnecessary equipment/Purposeful Proactive Rounding/Room/bathroom lighting operational, light cord in reach Bed in lowest position, wheels locked, appropriate side rails in place/Call bell, personal items and telephone in reach/Instruct patient to call for assistance before getting out of bed or chair/Non-slip footwear when patient is out of bed/[place] to call system/Physically safe environment - no spills, clutter or unnecessary equipment/Purposeful Proactive Rounding/Room/bathroom lighting operational, light cord in reach

## 2023-12-05 NOTE — ASU PATIENT PROFILE, ADULT - HOW PATIENT ADDRESSED, PROFILE
Ditropan is the oldest and cheapest  Of the drugs for urge incontinence.    Myrbetriq is newer.   We could try Tolteridone but first find out is she willing to continue the Oxybutinin (ditropan)?   Anoradha

## 2023-12-06 ENCOUNTER — RESULT REVIEW (OUTPATIENT)
Age: 48
End: 2023-12-06

## 2023-12-06 ENCOUNTER — OUTPATIENT (OUTPATIENT)
Dept: OUTPATIENT SERVICES | Facility: HOSPITAL | Age: 48
LOS: 1 days | Discharge: HOME | End: 2023-12-06
Payer: MEDICAID

## 2023-12-06 ENCOUNTER — APPOINTMENT (OUTPATIENT)
Dept: NUCLEAR MEDICINE | Facility: HOSPITAL | Age: 48
End: 2023-12-06

## 2023-12-06 ENCOUNTER — TRANSCRIPTION ENCOUNTER (OUTPATIENT)
Age: 48
End: 2023-12-06

## 2023-12-06 ENCOUNTER — APPOINTMENT (OUTPATIENT)
Dept: SURGICAL ONCOLOGY | Facility: HOSPITAL | Age: 48
End: 2023-12-06

## 2023-12-06 VITALS
DIASTOLIC BLOOD PRESSURE: 82 MMHG | TEMPERATURE: 98 F | OXYGEN SATURATION: 100 % | SYSTOLIC BLOOD PRESSURE: 140 MMHG | HEART RATE: 96 BPM | RESPIRATION RATE: 14 BRPM

## 2023-12-06 VITALS
SYSTOLIC BLOOD PRESSURE: 113 MMHG | OXYGEN SATURATION: 100 % | DIASTOLIC BLOOD PRESSURE: 67 MMHG | HEIGHT: 65 IN | HEART RATE: 78 BPM | WEIGHT: 173.94 LBS | RESPIRATION RATE: 14 BRPM | TEMPERATURE: 98 F

## 2023-12-06 DIAGNOSIS — Z95.1 PRESENCE OF AORTOCORONARY BYPASS GRAFT: Chronic | ICD-10-CM

## 2023-12-06 DIAGNOSIS — C50.919 MALIGNANT NEOPLASM OF UNSPECIFIED SITE OF UNSPECIFIED FEMALE BREAST: ICD-10-CM

## 2023-12-06 DIAGNOSIS — Z98.891 HISTORY OF UTERINE SCAR FROM PREVIOUS SURGERY: Chronic | ICD-10-CM

## 2023-12-06 PROBLEM — E78.5 HYPERLIPIDEMIA, UNSPECIFIED: Chronic | Status: ACTIVE | Noted: 2023-11-30

## 2023-12-06 PROBLEM — I10 ESSENTIAL (PRIMARY) HYPERTENSION: Chronic | Status: ACTIVE | Noted: 2023-11-30

## 2023-12-06 PROBLEM — I25.10 ATHEROSCLEROTIC HEART DISEASE OF NATIVE CORONARY ARTERY WITHOUT ANGINA PECTORIS: Chronic | Status: ACTIVE | Noted: 2023-11-30

## 2023-12-06 LAB
GLUCOSE BLDC GLUCOMTR-MCNC: 150 MG/DL — HIGH (ref 70–99)
GLUCOSE BLDC GLUCOMTR-MCNC: 150 MG/DL — HIGH (ref 70–99)
HCG UR QL: NEGATIVE — SIGNIFICANT CHANGE UP
HCG UR QL: NEGATIVE — SIGNIFICANT CHANGE UP

## 2023-12-06 PROCEDURE — 88307 TISSUE EXAM BY PATHOLOGIST: CPT | Mod: 26

## 2023-12-06 PROCEDURE — 38525 BIOPSY/REMOVAL LYMPH NODES: CPT | Mod: RT

## 2023-12-06 PROCEDURE — 38792 RA TRACER ID OF SENTINL NODE: CPT | Mod: RT,59

## 2023-12-06 PROCEDURE — 38900 IO MAP OF SENT LYMPH NODE: CPT | Mod: RT

## 2023-12-06 PROCEDURE — 76098 X-RAY EXAM SURGICAL SPECIMEN: CPT | Mod: 26

## 2023-12-06 PROCEDURE — 19301 PARTIAL MASTECTOMY: CPT | Mod: RT

## 2023-12-06 RX ORDER — ASPIRIN/CALCIUM CARB/MAGNESIUM 324 MG
1 TABLET ORAL
Refills: 0 | DISCHARGE

## 2023-12-06 RX ORDER — HYDROMORPHONE HYDROCHLORIDE 2 MG/ML
0.5 INJECTION INTRAMUSCULAR; INTRAVENOUS; SUBCUTANEOUS
Refills: 0 | Status: DISCONTINUED | OUTPATIENT
Start: 2023-12-06 | End: 2023-12-06

## 2023-12-06 RX ORDER — CLOPIDOGREL BISULFATE 75 MG/1
1 TABLET, FILM COATED ORAL
Refills: 0 | DISCHARGE

## 2023-12-06 RX ORDER — DULAGLUTIDE 4.5 MG/.5ML
1.5 INJECTION, SOLUTION SUBCUTANEOUS
Refills: 0 | DISCHARGE

## 2023-12-06 RX ORDER — ATORVASTATIN CALCIUM 80 MG/1
1 TABLET, FILM COATED ORAL
Refills: 0 | DISCHARGE

## 2023-12-06 RX ORDER — OXYCODONE HYDROCHLORIDE 5 MG/1
1 TABLET ORAL
Qty: 12 | Refills: 0
Start: 2023-12-06 | End: 2023-12-08

## 2023-12-06 RX ORDER — FENTANYL CITRATE 50 UG/ML
25 INJECTION INTRAVENOUS
Refills: 0 | Status: DISCONTINUED | OUTPATIENT
Start: 2023-12-06 | End: 2023-12-06

## 2023-12-06 RX ORDER — OXYCODONE HYDROCHLORIDE 5 MG/1
1 TABLET ORAL
Qty: 12 | Refills: 0
Start: 2023-12-06

## 2023-12-06 RX ORDER — LOSARTAN POTASSIUM 100 MG/1
1 TABLET, FILM COATED ORAL
Refills: 0 | DISCHARGE

## 2023-12-06 RX ORDER — ONDANSETRON 8 MG/1
4 TABLET, FILM COATED ORAL ONCE
Refills: 0 | Status: DISCONTINUED | OUTPATIENT
Start: 2023-12-06 | End: 2023-12-20

## 2023-12-06 RX ORDER — DOCUSATE SODIUM 100 MG
1 CAPSULE ORAL
Qty: 30 | Refills: 0
Start: 2023-12-06

## 2023-12-06 RX ORDER — SENNA PLUS 8.6 MG/1
1 TABLET ORAL
Qty: 30 | Refills: 0
Start: 2023-12-06

## 2023-12-06 RX ORDER — OXYCODONE HYDROCHLORIDE 5 MG/1
5 TABLET ORAL ONCE
Refills: 0 | Status: DISCONTINUED | OUTPATIENT
Start: 2023-12-06 | End: 2023-12-06

## 2023-12-06 RX ORDER — METFORMIN HYDROCHLORIDE 850 MG/1
1 TABLET ORAL
Refills: 0 | DISCHARGE

## 2023-12-06 RX ADMIN — OXYCODONE HYDROCHLORIDE 5 MILLIGRAM(S): 5 TABLET ORAL at 15:55

## 2023-12-06 RX ADMIN — OXYCODONE HYDROCHLORIDE 5 MILLIGRAM(S): 5 TABLET ORAL at 16:30

## 2023-12-06 NOTE — ASU DISCHARGE PLAN (ADULT/PEDIATRIC) - NS MD DC FALL RISK RISK
For information on Fall & Injury Prevention, visit: [email or web address] OR  [email or web address] OR  https://www.cdc.gov/steadi/patient.html For information on Fall & Injury Prevention, visit: [email or web address]://[email or web address] OR  [email or web address]://[email or web address]-injury OR  https://www.cdc.gov/steadi/patient.html

## 2023-12-06 NOTE — ASU DISCHARGE PLAN (ADULT/PEDIATRIC) - FOLLOW UP APPOINTMENTS
[phone] may also call Recovery Room (PACU) 24/7 @ [phone]/[facility] may also call Recovery Room (PACU) 24/7 @ [phone]/[facility]

## 2023-12-06 NOTE — ASU DISCHARGE PLAN (ADULT/PEDIATRIC) - NURSING INSTRUCTIONS
DO NOT take any Tylenol (Acetaminophen) or narcotics containing Tylenol until after  7pm_ . You received Tylenol during your operation and it can cause damage to your liver if too much is taken within a 24 hour time period.

## 2023-12-06 NOTE — ASU DISCHARGE PLAN (ADULT/PEDIATRIC) - PROVIDER TOKENS
PROVIDER:[TOKEN:[[id number]],FOLLOWUP:[2 weeks],ESTABLISHEDPATIENT:[T]] PROVIDER:[TOKEN:[[id number]],FOLLOWUP:[2 weeks],ESTABLISHEDPATIENT:[T]]

## 2023-12-06 NOTE — BRIEF OPERATIVE NOTE - NSICDXBRIEFPROCEDURE_GEN_ALL_CORE_FT
PROCEDURES:  Partial mastectomy of right breast 06-Dec-2023 14:43:00  Milady Butts  Biopsy or excision, deep lymph node, axillary 06-Dec-2023 14:43:05 Right axilla Milady Butts  Identification, lymph node, sentinel, intraoperative, using dye injection 06-Dec-2023 14:43:14  Milady Butts

## 2023-12-06 NOTE — ASU DISCHARGE PLAN (ADULT/PEDIATRIC) - BATHING
Do Not bathe in a pool or tub until post-op check. You can shower in 48 hrs/Do not submerge in water

## 2023-12-06 NOTE — ASU DISCHARGE PLAN (ADULT/PEDIATRIC) - CARE PROVIDER_API CALL
Reyes, Sylvia Alicia  Surgical Oncology  [address], [place] [address]  Phone: [phone]  Fax: [phone]  Established Patient  Follow Up Time: 2 weeks   Reyes, Sylvia Alicia  Surgical Oncology  [address], [place] [address]  Phone: [phone]  Fax: [phone]  Established Patient  Follow Up Time: 2 weeks

## 2023-12-06 NOTE — ASU PREOP CHECKLIST - ALLERGY BAND ON
Alert female c/o rt upper/lower dental pain, pt reports chronic pain worse over past week, states she did not take her bp medication this am. no known allergies

## 2023-12-06 NOTE — BRIEF OPERATIVE NOTE - OPERATION/FINDINGS
Right breast lateral incision was made, specimen localized with paige  and removed. XR confirmed paige  and clip in specimen, imaging removed with no additional margins taken. Clips were used to denote lumpectomy cavity. Attention was then directed to the axilla, Using the same incision, 4 sentinel lymph nodes where identified using the gamma probe (All hot). Axillary cavity was closed with a running closure. Hemostasis was achieved. Incision was closed in multiple layers

## 2023-12-08 ENCOUNTER — APPOINTMENT (OUTPATIENT)
Dept: OBGYN | Facility: CLINIC | Age: 48
End: 2023-12-08

## 2023-12-12 LAB
SURGICAL PATHOLOGY STUDY: SIGNIFICANT CHANGE UP

## 2023-12-15 ENCOUNTER — APPOINTMENT (OUTPATIENT)
Dept: OPHTHALMOLOGY | Facility: CLINIC | Age: 48
End: 2023-12-15

## 2023-12-21 ENCOUNTER — APPOINTMENT (OUTPATIENT)
Dept: SURGICAL ONCOLOGY | Facility: CLINIC | Age: 48
End: 2023-12-21
Payer: MEDICAID

## 2023-12-21 VITALS
WEIGHT: 175 LBS | SYSTOLIC BLOOD PRESSURE: 120 MMHG | HEART RATE: 91 BPM | DIASTOLIC BLOOD PRESSURE: 78 MMHG | BODY MASS INDEX: 28.12 KG/M2 | OXYGEN SATURATION: 99 % | HEIGHT: 66 IN

## 2023-12-21 PROCEDURE — 99024 POSTOP FOLLOW-UP VISIT: CPT

## 2023-12-21 NOTE — PHYSICAL EXAM
[Normal] : oriented to person, place and time, with appropriate affect [de-identified] : Right breast surgical incision (upper lateral) is c/d/i, well-approximated, large visible seroma, bruising noted on inferior pole, no evidence of infection, no parenchymal defect [de-identified] : Normal respiratory effort

## 2023-12-21 NOTE — ASSESSMENT
[FreeTextEntry1] : RAGHAV LAINEZ is a 48-year F w/ Right breast cT1 cN0 IDC/DCIS +/+/- diagnosed in 10/2023 s/p R paige loc PM, R SLNBx (0/6) on 12/6/23.  11/7/23 [facility] panel: Negative, VUS   We discussed her surgical pathology which confirmed right moderately differentiated IDC to be 1.1 cm with clear final margins. No lymph node involvement 0/6.  She is healing well with the exception of a right breast seroma.  In-office ultrasound confirmed seroma of the right breast.  Using sterile technique, a 22g needle was inserted through the insensate right UOQ incision and 60cc serosanguinous fluid was aspirated.   Patient felt significant relief after the procedure.   She will continue with warm compresses and scar massage.    Referred to Medical Oncology for initial consultation, oncotype Dx to be sent today  Referred to Radiation Oncology for initial consultation for post-lumpectomy RT. Next imaging:  BL Dx MG/US due 10/2024.  RTO in 2 weeks for clinical exam. She knows to return sooner if any breast abnormalities or if any breast issues/concerns arise.

## 2023-12-21 NOTE — REASON FOR VISIT
[de-identified] : Right breast paige localized partial mastectomy, right SLNBx  [de-identified] : 12/6/2023  [de-identified] : 15

## 2023-12-21 NOTE — HISTORY OF PRESENT ILLNESS
[de-identified] : RAGHAV LAINEZ is a 48-year F w/ Right breast cT1 cN0 IDC/DCIS +/+/- diagnosed in 10/2023 s/p R paige loc PM, R SLNBx () on 23. Here for post-operative visit.   23 She denies any palpable breast masses, nipple discharge or skin changes.  23 POD #15.  Pain well controlled.  She reports increased bruising and swelling in the right breast immediately postop and has remained stable.  She continues on plavix.  No fevers or chills.    Referred by: Dr. Antonietta Mcadams (PCP)  Cardiologist: Dr. Bryant  PMHx: T2DM (on insulin), CAD s/p CABG w/ Dr. Nix, HLD, and HTN PSHx:  , robotic CABG 3/2023 Meds: ASA 81, Lipitor 80, Losartan 25 mg QD, Trulicity, lantus 20 qhs, lispro 5 u qAC. Metformin 500 qAM, 1,000 qPM, Multivitamin.  NKDA Family Hx: Anal cancer (Father dx in his 70s) GYN: , menarche age 13, LMP 10/17/23. Age at first pregnancy 26.  Y (2 months) Bra size: 38 B Occupation:    Social: Smoking: Denies        ETOH: Socially

## 2023-12-21 NOTE — RESULTS/DATA
[FreeTextEntry1] : BREAST PATH/RAD REVIEW  [facility]: 8/23/2018 BL SC MG: BIRADs 1. Heterogeneously dense.  9/30/2023 BL SC MG: BIRADs. 0. Scattered areas of fibroglandular density. BL scattered diffuse nodularity; BL scattered calcs. Interval development of a R posterior UOQ 7 mm spiculated mass (Rec R Dx US). Stable R axillary low-lying LN.  10/13/2023 R Dx US: BIRADs 5. R 11N8 0.7 cm hypoechoic mass w/ angular margins. Taller than wide (Rec USGBx). No suspicious R axillary LAD. R 9N5 and L 10N3 cysts.   10/27/2023 Right US guided bx: 1. Right 11N8 core bx: Invasive moderately differentiated ductal carcinoma. Intermediate grade DCIS, microcalcs present associated w/ invasive carcinoma. LVP not seen. Coil Clip. Concordant. ER 90%, AR 90%, HER2 Negative (1+ membrane staining) (Rec Surg c/s)   11/24/2023 Breast MRI: BIRADs 6. - R posterior central outer bx-proven malignancy associated with a 0.8 cm enhancing mass. (continued surg c/s) - No suspicious L breast enhancement.  - No axillary or IM LAD  12/6/2023 s/p R paige loc PM, R SLNBx (0/6) at [facility].  pT1c pN0  Path (Right 11:00 lumpectomy): Moderately differentiated IDC, 1.1 cm. bx site changes. Clear margins

## 2023-12-22 ENCOUNTER — APPOINTMENT (OUTPATIENT)
Dept: CARDIOLOGY | Facility: CLINIC | Age: 48
End: 2023-12-22
Payer: MEDICAID

## 2023-12-22 VITALS
SYSTOLIC BLOOD PRESSURE: 102 MMHG | WEIGHT: 175 LBS | DIASTOLIC BLOOD PRESSURE: 70 MMHG | BODY MASS INDEX: 28.12 KG/M2 | HEART RATE: 90 BPM | OXYGEN SATURATION: 100 % | HEIGHT: 66 IN

## 2023-12-22 PROCEDURE — 99214 OFFICE O/P EST MOD 30 MIN: CPT | Mod: 25

## 2023-12-22 PROCEDURE — 93000 ELECTROCARDIOGRAM COMPLETE: CPT

## 2023-12-22 NOTE — DISCUSSION/SUMMARY
[Coronary Artery Disease] : coronary artery disease [Hyperlipidemia] : hyperlipidemia [Essential Hypertension] : essential hypertension [Stable] : stable [None] : There are no changes in medication management [___ Month(s)] : in [unfilled] month(s) [FreeTextEntry1] : 9/1/2023  s/p robotic CABG by Dinah Luz.  no chest pain.  BP, lipds are well controlled.  needs better diabetic control.  maintain current meds. f/u 3 months  12/1/2023  spoke with CT surgery.  Pt may stop plavix 5 days in advance of mastectomy.  12/22/2023  completed breast surgery.  no cardiac issues.  no chest pain. dyspnea, ecg is normal  pt to find out if she will require chemotherapy when she follows up in two weeks, complete year of plavix and dc  [EKG obtained to assist in diagnosis and management of assessed problem(s)] : EKG obtained to assist in diagnosis and management of assessed problem(s)

## 2023-12-22 NOTE — HISTORY OF PRESENT ILLNESS
[FreeTextEntry1] :  Pt is 48 year old female  with 15 year hx of DM and recent dx htn and elevated cholesterol.  came to US last year.  she presented to hosp with chest pain and found to have  of LAD and underwent robotic CABG with Dr. Nix.  no chest pain, but little sticking pain. has drainage and swelling from surgical site and being followed by Dr. Nix.   never smoked rare social alcohol hx of c section nkda father had  CABG two years ago   5/22/2023  no new complaints, blood work reviewed with pt.  will be starting cardiac rehab.  only issue is mild pedal edema.  9/1/2023  no new complaints.  never started cardiac rehab.  she is walking at home.  labs reviewed cholesterol is at goal  12/22/2023  completed breast surgery no cardiac issues.. will prob need course of rad trx. probably no chemotherapy but pt will know for sure after her follow up.

## 2023-12-28 ENCOUNTER — NON-APPOINTMENT (OUTPATIENT)
Age: 48
End: 2023-12-28

## 2023-12-29 ENCOUNTER — OUTPATIENT (OUTPATIENT)
Dept: OUTPATIENT SERVICES | Facility: HOSPITAL | Age: 48
LOS: 1 days | End: 2023-12-29
Payer: COMMERCIAL

## 2023-12-29 ENCOUNTER — APPOINTMENT (OUTPATIENT)
Dept: INTERNAL MEDICINE | Facility: CLINIC | Age: 48
End: 2023-12-29
Payer: MEDICAID

## 2023-12-29 DIAGNOSIS — E11.9 TYPE 2 DIABETES MELLITUS WITHOUT COMPLICATIONS: ICD-10-CM

## 2023-12-29 DIAGNOSIS — Z98.891 HISTORY OF UTERINE SCAR FROM PREVIOUS SURGERY: Chronic | ICD-10-CM

## 2023-12-29 DIAGNOSIS — I10 ESSENTIAL (PRIMARY) HYPERTENSION: ICD-10-CM

## 2023-12-29 PROCEDURE — 99213 OFFICE O/P EST LOW 20 MIN: CPT | Mod: GE,95

## 2023-12-29 PROCEDURE — G0463: CPT

## 2023-12-29 NOTE — PHYSICAL EXAM
[No Acute Distress] : no acute distress [Well-Appearing] : well-appearing [Normal Sclera/Conjunctiva] : normal sclera/conjunctiva [Normal Outer Ear/Nose] : the outer ears and nose were normal in appearance [No Respiratory Distress] : no respiratory distress  [Normal Affect] : the affect was normal [Normal Insight/Judgement] : insight and judgment were intact

## 2023-12-29 NOTE — RESULTS/DATA
[FreeTextEntry1] : BREAST PATH/RAD REVIEW  [facility]: 8/23/2018 BL SC MG: BIRADs 1. Heterogeneously dense.  9/30/2023 BL SC MG: BIRADs. 0. Scattered areas of fibroglandular density. BL scattered diffuse nodularity; BL scattered calcs. Interval development of a R posterior UOQ 7 mm spiculated mass (Rec R Dx US). Stable R axillary low-lying LN.  10/13/2023 R Dx US: BIRADs 5. R 11N8 0.7 cm hypoechoic mass w/ angular margins. Taller than wide (Rec USGBx). No suspicious R axillary LAD. R 9N5 and L 10N3 cysts.   10/27/2023 Right US guided bx: 1. Right 11N8 core bx: Invasive moderately differentiated ductal carcinoma. Intermediate grade DCIS, microcalcs present associated w/ invasive carcinoma. LVP not seen. Coil Clip. Concordant. ER 90%, CT 90%, HER2 Negative (1+ membrane staining) (Rec Surg c/s)

## 2023-12-29 NOTE — ASSESSMENT
[FreeTextEntry1] : RAGHAV LAINEZ is a 48-year F recently found to have Right breast cT1 cN0 IDC/DCIS +/+/- [Right 11N8 0.7 cm].   We had a lengthy discussion regarding the natural history of breast cancer, her pathology results, her breast Imaging, staging, surgical options and adjuvant treatment. Please see scanned progress noted for personalized care plan.   We discussed surgical options of lumpectomy vs mastectomy as well as axillary surgery and the risks and benefits of each including but not limited to bleeding, infection, altered cosmesis, recovery time, possible need for reoperation, numbness, and recurrence risk.  After shared-decision making, we decided on breast conservation and we discussed the need for paige localization and possible tissue transfer.   Tentative surgical plan: Right breast paige localized partial mastectomy, right axillary sentinel lymph node biopsy, possible tissue transfer.   Tentative surgical date: 12/6/2023 at [facility]   - Breast MRI to evaluate extent of disease and for surgical planning  - Genetics to be drawn in-office today.  - RTO POV

## 2023-12-29 NOTE — HISTORY OF PRESENT ILLNESS
[de-identified] : RAGHAV LAINEZ is a 48-year recently found to have Right breast cT1 cN0 IDC/DCIS +/+/-, Here for initial consultation.   She denies any palpable breast masses, nipple discharge or skin changes.   Referred by: Dr. Antonietta Mcadams (PCP)  Cardiologist: Dr. Bryant  PMHx: T2DM (on insulin), CAD s/p CABG w/ Dr. Nix, HLD, and HTN PSHx:  , robotic CABG 3/2023 Meds: ASA 81, Lipitor 80, Losartan 25 mg QD, Trulicity, lantus 20 qhs, lispro 5 u qAC. Metformin 500 qAM, 1,000 qPM, Multivitamin.  NKDA Family Hx: Anal cancer (Father dx in his 70s) GYN: , menarche age 13, LMP 10/17/23. Age at first pregnancy 26.  Y (2 months) Bra size: 38 B Occupation:    Social: Smoking: Denies        ETOH: Socially

## 2023-12-29 NOTE — PHYSICAL EXAM
[Normal] : supple, no neck mass and thyroid not enlarged [Normal Neck Lymph Nodes] : normal neck lymph nodes  [Normal Supraclavicular Lymph Nodes] : normal supraclavicular lymph nodes [Normal Axillary Lymph Nodes] : normal axillary lymph nodes [Normal] : oriented to person, place and time, with appropriate affect [de-identified] : Right breast with post-biopsy changes and faintly palpable mass (R11N8).  No other palpable mass in either breast.  No clinical lymphadenopathy.    [de-identified] : Normal respiratory effort

## 2023-12-30 NOTE — HISTORY OF PRESENT ILLNESS
[Home] : at home, [unfilled] , at the time of the visit. [Medical Office: ([facility])___] : at the medical office located in  [Verbal consent obtained from patient] : the patient, [unfilled] [FreeTextEntry1] : Follow up [de-identified] : 48F with T2DM, CAD s/p CABG, HLD, and HTN who presents for TEB follow up. LV 11/2023 for follow up.  #CAD Presented with migraines and CP, trops negative but found to have LAD  s/p CABG 3/2023 Unable to start cardiac rehab as unable to schedule with work hours Follows with cards - On ASA, plavix, lipitor 80, losartan 25 mg daily  #T2DM A1c 8.0 8/2023 -> 7.4 11/2023 Prior to hospitalization 3/2023, was on metformin monotherapy Urine albumin/cr wnl 8/2023 FS: had phone issues so has not been wearing her CGM. Took occasional finger sticks. Reports that highest -190s. Lowest occurred 5-6d ago, 59 at night. Will restart CGM today. Off basal bolus since 10/2023 - c/w metformin 1g BID - on Trulicity to 1.5 mg qweek - Losartan and lipitor as above - s/p nutrition referral - Endocrine referral placed, has upcoming appointment 1/4/2024  #R breast cancer #Pre-op evaluation 10/2023 bx with DCIS S/p R partial mastectomy 12/6/2023. No LN involvement. Did require seroma drainage as last post-op visit. Denies fevers or erythema over surgical site. Pending Rad onc and med onc eval.  #HCM Cervical ca: pap smear and HPV wnl 6/2023, UTD Breast ca: mammogram/US breast 2023 R breast BIRADS 5 -> DCIS Colon ca: GI referral placed, pending insurance approval Immunizations: S/p 2 dose Pfizer COVID, TDAP (w/n last 10 years), PCV20 2023, flu (2023).

## 2023-12-30 NOTE — ASSESSMENT
[FreeTextEntry1] : 48F with T2DM, CAD s/p CABG, HLD, and HTN who presents for TEB follow up. LV 11/2023 for follow up.  #CAD Presented with migraines and CP, trops negative but found to have LAD  s/p CABG 3/2023 Unable to start cardiac rehab as unable to schedule with work hours Follows with cards - On ASA, plavix, lipitor 80, losartan 25 mg daily  #T2DM A1c 8.0 8/2023 -> 7.4 11/2023 Prior to hospitalization 3/2023, was on metformin monotherapy Urine albumin/cr wnl 8/2023 FS: had phone issues so has not been wearing her CGM. Took occasional finger sticks. Reports that highest -190s. Lowest occurred 5-6d ago, 59 at night. Will restart CGM today. Off basal bolus since 10/2023 FS stable off of basal bolus with still some intermittent low FS. Will continue with current regimen and can consider Trulicity uptitration if FS on CGM persistently elevated or if repeat A1c (due 2/2023) without improvement. - c/w metformin 1g BID - c/w Trulicity 1.5 mg qweek - Losartan and lipitor as above - s/p nutrition referral - Endocrine referral placed, has upcoming appointment 1/4/2024  #R breast cancer #Pre-op evaluation 10/2023 bx with DCIS S/p R partial mastectomy 12/6/2023. No LN involvement. Did require seroma drainage as last post-op visit. Denies fevers or erythema over surgical site. Pending Rad onc and med onc eval.  #HCM Cervical ca: pap smear and HPV wnl 6/2023, UTD Breast ca: mammogram/US breast 2023 R breast BIRADS 5 -> DCIS Colon ca: GI referral placed, pending insurance approval Immunizations: S/p 2 dose Pfizer COVID, TDAP (w/n last 10 years), PCV20 2023, flu (2023).  RTC 10 weeks

## 2024-01-04 ENCOUNTER — APPOINTMENT (OUTPATIENT)
Dept: SURGICAL ONCOLOGY | Facility: CLINIC | Age: 49
End: 2024-01-04
Payer: MEDICAID

## 2024-01-04 ENCOUNTER — APPOINTMENT (OUTPATIENT)
Dept: ENDOCRINOLOGY | Facility: HOSPITAL | Age: 49
End: 2024-01-04

## 2024-01-04 ENCOUNTER — RESULT CHARGE (OUTPATIENT)
Age: 49
End: 2024-01-04

## 2024-01-04 ENCOUNTER — OUTPATIENT (OUTPATIENT)
Dept: OUTPATIENT SERVICES | Facility: HOSPITAL | Age: 49
LOS: 1 days | End: 2024-01-04
Payer: COMMERCIAL

## 2024-01-04 VITALS
SYSTOLIC BLOOD PRESSURE: 135 MMHG | RESPIRATION RATE: 16 BRPM | HEART RATE: 96 BPM | BODY MASS INDEX: 27.32 KG/M2 | WEIGHT: 170 LBS | HEIGHT: 66 IN | DIASTOLIC BLOOD PRESSURE: 84 MMHG | TEMPERATURE: 98 F

## 2024-01-04 VITALS
RESPIRATION RATE: 17 BRPM | BODY MASS INDEX: 27.32 KG/M2 | HEART RATE: 84 BPM | SYSTOLIC BLOOD PRESSURE: 126 MMHG | OXYGEN SATURATION: 98 % | HEIGHT: 66 IN | DIASTOLIC BLOOD PRESSURE: 79 MMHG | WEIGHT: 170 LBS

## 2024-01-04 DIAGNOSIS — E34.9 ENDOCRINE DISORDER, UNSPECIFIED: ICD-10-CM

## 2024-01-04 LAB
GLUCOSE BLDC GLUCOMTR-MCNC: 156
GLUCOSE BLDC GLUCOMTR-MCNC: 156 MG/DL — HIGH (ref 70–99)
GLUCOSE BLDC GLUCOMTR-MCNC: 156 MG/DL — HIGH (ref 70–99)

## 2024-01-04 PROCEDURE — G0463: CPT

## 2024-01-04 PROCEDURE — 99024 POSTOP FOLLOW-UP VISIT: CPT

## 2024-01-04 PROCEDURE — 82962 GLUCOSE BLOOD TEST: CPT

## 2024-01-04 RX ORDER — FLASH GLUCOSE SENSOR
KIT MISCELLANEOUS
Qty: 2 | Refills: 3 | Status: DISCONTINUED | COMMUNITY
Start: 2023-08-11 | End: 2024-01-04

## 2024-01-04 RX ORDER — FLASH GLUCOSE SCANNING READER
EACH MISCELLANEOUS
Qty: 1 | Refills: 3 | Status: DISCONTINUED | COMMUNITY
Start: 2023-08-11 | End: 2024-01-04

## 2024-01-05 DIAGNOSIS — E11.8 TYPE 2 DIABETES MELLITUS WITH UNSPECIFIED COMPLICATIONS: ICD-10-CM

## 2024-01-05 DIAGNOSIS — E78.00 PURE HYPERCHOLESTEROLEMIA, UNSPECIFIED: ICD-10-CM

## 2024-01-05 NOTE — PHYSICAL EXAM
[No Respiratory Distress] : no respiratory distress [Clear to Auscultation] : lungs were clear to auscultation bilaterally [Alert] : alert [No Acute Distress] : no acute distress [Normal Sclera/Conjunctiva] : normal sclera/conjunctiva [EOMI] : extra ocular movement intact [Normal Outer Ear/Nose] : the ears and nose were normal in appearance [Normal Hearing] : hearing was normal [Normal S1, S2] : normal S1 and S2 [Regular Rhythm] : with a regular rhythm [Soft] : abdomen soft [Normal Bowel Sounds] : normal bowel sounds [Normal Gait] : normal gait [Normal Strength/Tone] : muscle strength and tone were normal [No Rash] : no rash [No Motor Deficits] : the motor exam was normal [No Tremors] : no tremors [Oriented x3] : oriented to person, place, and time [Normal Affect] : the affect was normal [Normal Insight/Judgement] : insight and judgment were intact [Normal Mood] : the mood was normal [de-identified] : Nonpitting edema in BLE

## 2024-01-05 NOTE — ASSESSMENT
[FreeTextEntry1] : 48F w/ T2DM, CAD s/p CABG, breast cancer s/p resection who presents for initial evaluation of DM.  T2DM HLD Diagnosis: Around 2006 Last A1c 7.4% 11/2023 C/b CAD s/p CABG Current regimen: Trulicity 1.5mg weekly (started 10/2023), metformin 1000mg BID  SMBG: CGM Freestyle beena 2. Limited data, not scanning enough throughout the day. AM: 120-180s.  - Increase trulicity to 3mg weekly - c/w metformin 1g BID - eGFR: 113 (8/2023), urine alb/cr negative 8/2023 - LDL 49 8/2023 on atorva 80mg QHS - Needs f/u with ophtho, possible retinopathy. Last visit 9/2023. - Needs f/u with podiatry - To repeat A1c in next 1-1.5 months with next PCP visit - continue FL2 CGM   RTC in 3-4 months   Discussed with Dr. Mckeon

## 2024-01-05 NOTE — HISTORY OF PRESENT ILLNESS
[FreeTextEntry1] : 48F w/ T2DM, CAD s/p CABG, breast cancer s/p resection who presents for initial evaluation of DM.  DM Type 2 Diagnosis: Around 2006 After hospitalization 3/2023 for CABG, was discharged with basal bolus insulin. Off basal bolus since 10/2023. Current regimen: Trulicity 1.5mg weekly (started 10/2023), metformin 1000mg BID  PCP/Prior endocrinologist: No prior endo Last A1c: A1c 8.0 8/2023 -> 7.4 11/2023 SMBG: CGM Freestyle beena 2. Limited data, not scanning enough throughout the day. AM: 120-180s.  Hypoglycemia: Yes CGM shows 60s, no symptoms. Not confirming with fingerstick.  Tolerating trulicity - no n/v/d. Lost about 9lb since starting trulicity.    Complications: CAD: Yes s/p CABG CVA: no Nephropathy: no Retinopathy: unsure Neuropathy: sometimes has numbness/tingling in feet No hx of pancreatitis, thyroid cancer, UTI's  Diet: Breakfast: Toast with eggs with fruit/ripe plantain or cereal Lunch: rice with judd or salad with chicken + fruits  Dinner: rice with fried potatoes or salmon with vegetables Snacks: sometimes salted crackers or soup Drinks: mostly Coffee with milk or zero-creamer or Tea, water  Exercise: none  eGFR: 113 (8/2023) alb/cr: negative (8/2023)  Foot exam: has not seen podiatrist Issues: numbness/tingling occasionally   Eye exam: Last visit 9/2023. Has upcoming appt in 2/2024. Retinopathy: Unsure but was told she has spots of bleeding  SHx: Alcohol: on occasion Smoking: no Work: not working currently FHx: Mother - DM   Other Meds: Atorvastatin 80mg qhs Losartan 25mg daily ASA Plavix

## 2024-01-12 ENCOUNTER — OUTPATIENT (OUTPATIENT)
Dept: OUTPATIENT SERVICES | Facility: HOSPITAL | Age: 49
LOS: 1 days | Discharge: HOME | End: 2024-01-12

## 2024-01-12 DIAGNOSIS — Z98.891 HISTORY OF UTERINE SCAR FROM PREVIOUS SURGERY: Chronic | ICD-10-CM

## 2024-01-12 DIAGNOSIS — C50.919 MALIGNANT NEOPLASM OF UNSPECIFIED SITE OF UNSPECIFIED FEMALE BREAST: ICD-10-CM

## 2024-01-12 DIAGNOSIS — Z95.1 PRESENCE OF AORTOCORONARY BYPASS GRAFT: Chronic | ICD-10-CM

## 2024-01-17 ENCOUNTER — APPOINTMENT (OUTPATIENT)
Dept: RADIATION ONCOLOGY | Facility: CLINIC | Age: 49
End: 2024-01-17
Payer: MEDICAID

## 2024-01-17 ENCOUNTER — RESULT REVIEW (OUTPATIENT)
Age: 49
End: 2024-01-17

## 2024-01-17 ENCOUNTER — APPOINTMENT (OUTPATIENT)
Dept: HEMATOLOGY ONCOLOGY | Facility: CLINIC | Age: 49
End: 2024-01-17
Payer: MEDICAID

## 2024-01-17 ENCOUNTER — NON-APPOINTMENT (OUTPATIENT)
Age: 49
End: 2024-01-17

## 2024-01-17 VITALS
WEIGHT: 169.73 LBS | OXYGEN SATURATION: 98 % | DIASTOLIC BLOOD PRESSURE: 86 MMHG | SYSTOLIC BLOOD PRESSURE: 126 MMHG | HEIGHT: 66.54 IN | TEMPERATURE: 97.3 F | RESPIRATION RATE: 16 BRPM | HEART RATE: 79 BPM | BODY MASS INDEX: 26.96 KG/M2

## 2024-01-17 LAB
BASOPHILS # BLD AUTO: 0.05 K/UL — SIGNIFICANT CHANGE UP (ref 0–0.2)
BASOPHILS NFR BLD AUTO: 0.7 % — SIGNIFICANT CHANGE UP (ref 0–2)
EOSINOPHIL # BLD AUTO: 0.1 K/UL — SIGNIFICANT CHANGE UP (ref 0–0.5)
EOSINOPHIL NFR BLD AUTO: 1.3 % — SIGNIFICANT CHANGE UP (ref 0–6)
HCT VFR BLD CALC: 33.8 % — LOW (ref 34.5–45)
HGB BLD-MCNC: 10.6 G/DL — LOW (ref 11.5–15.5)
IMM GRANULOCYTES NFR BLD AUTO: 0.3 % — SIGNIFICANT CHANGE UP (ref 0–0.9)
LYMPHOCYTES # BLD AUTO: 3.01 K/UL — SIGNIFICANT CHANGE UP (ref 1–3.3)
LYMPHOCYTES # BLD AUTO: 39.6 % — SIGNIFICANT CHANGE UP (ref 13–44)
MCHC RBC-ENTMCNC: 27.7 PG — SIGNIFICANT CHANGE UP (ref 27–34)
MCHC RBC-ENTMCNC: 31.4 G/DL — LOW (ref 32–36)
MCV RBC AUTO: 88.5 FL — SIGNIFICANT CHANGE UP (ref 80–100)
MONOCYTES # BLD AUTO: 0.45 K/UL — SIGNIFICANT CHANGE UP (ref 0–0.9)
MONOCYTES NFR BLD AUTO: 5.9 % — SIGNIFICANT CHANGE UP (ref 2–14)
NEUTROPHILS # BLD AUTO: 3.98 K/UL — SIGNIFICANT CHANGE UP (ref 1.8–7.4)
NEUTROPHILS NFR BLD AUTO: 52.2 % — SIGNIFICANT CHANGE UP (ref 43–77)
NRBC # BLD: 0 /100 WBCS — SIGNIFICANT CHANGE UP (ref 0–0)
PLATELET # BLD AUTO: 240 K/UL — SIGNIFICANT CHANGE UP (ref 150–400)
RBC # BLD: 3.82 M/UL — SIGNIFICANT CHANGE UP (ref 3.8–5.2)
RBC # FLD: 13.1 % — SIGNIFICANT CHANGE UP (ref 10.3–14.5)
WBC # BLD: 7.61 K/UL — SIGNIFICANT CHANGE UP (ref 3.8–10.5)
WBC # FLD AUTO: 7.61 K/UL — SIGNIFICANT CHANGE UP (ref 3.8–10.5)

## 2024-01-17 PROCEDURE — 99205 OFFICE O/P NEW HI 60 MIN: CPT

## 2024-01-17 NOTE — HISTORY OF PRESENT ILLNESS
[FreeTextEntry1] : Ms Pierre is a 48 year old female with ER/CO+ HER2(-) G2 pt1cN0 Invasive ductal carcinoma with DCIS (on biopsy) s/p Right breast lumpectomy w neg margin and SLNB on 12/6/2023. Pathology showed no lymph nodes involved (0/6), oncotype 15 intermediate risk without plan for chemo with Dr. ramirez (seeing today 1/17)  PMH: CAD s/p CABG 3/2023, T2DM  Oncology History: History of dense breasts 9/30/2023 Bilateral screening mammogram: BIRADs. 0. Scattered areas of fibroglandular density. BL scattered diffuse nodularity; BL scattered calcs. Interval development of a R posterior UOQ 7 mm spiculated mass (Rec R Dx US). Stable R axillary low-lying LN. 10/13/2023 Right Diagnostic Ultrasound: BIRADs 5. R 11N8 0.7 cm hypoechoic mass w/ angular margins. Taller than wide (Rec USGBx). No suspicious R axillary LAD. R 9N5 and L 10N3 cysts.  10/27/2023 Right US guided bx: 1. Right 11N8 core bx: Invasive moderately differentiated ductal carcinoma. Intermediate grade DCIS, microcalcs present associated w/ invasive carcinoma. LVP not seen. Coil Clip. Concordant. ER 90%, CO 90%, HER2 Negative (1+ membrane staining) (Rec Surg c/s)  11/7/23 [facility] Myriad panel: Negative, VUS.  11/24/2023 Breast MRI: BIRADs 6. - R posterior central outer bx-proven malignancy associated with a 0.8 cm enhancing mass. (continued surg c/s) - No suspicious L breast enhancement. - No axillary or IM LAD  12/6/2023 She underwent Right breast paige loc Partial Mastectomy, Right SLNB by Dr Reyes Pathology showed .  Path (Right 11:00 lumpectomy): Moderately differentiated IDC, measures 1.1 cm. Clear margins.   0 out of 6 lymph nodes negative for carcinoma pT1c pN0     12/21/2023  right breast seroma s/p needle aspiration on 12/21/23.  Followed up with Dr Reyes. Right breast seroma improved, does not require further aspiration, recommend continuing w/ scar massage.  Dr. Arturo Ramirez on 1/17/24, oncotype Dx 15 no plan for chemo  1/17/2024 She presents in consultation or radiation therapy. She denies breast pain, swelling, nipple changes.

## 2024-01-17 NOTE — VITALS
[Maximal Pain Intensity: 0/10] : 0/10 [Least Pain Intensity: 0/10] : 0/10 [90: Able to carry normal activity; minor signs or symptoms of disease.] : 90: Able to carry normal activity; minor signs or symptoms of disease.  [Date: ____________] : Patient's last distress assessment performed on [unfilled]. [3 - Distress Level] : Distress Level: 3 [ECOG Performance Status: 0 - Fully active, able to carry on all pre-disease performance without restriction] : Performance Status: 0 - Fully active, able to carry on all pre-disease performance without restriction

## 2024-01-17 NOTE — PHYSICAL EXAM
[Sclera] : the sclera and conjunctiva were normal [Extraocular Movements] : extraocular movements were intact [Outer Ear] : the ears and nose were normal in appearance [Exaggerated Use Of Accessory Muscles For Inspiration] : no accessory muscle use [Edema] : no peripheral edema present [Not Anxious] : not anxious [Abdomen Soft] : soft [Nondistended] : nondistended [Normal] : no palpable adenopathy [Skin Color & Pigmentation] : normal skin color and pigmentation [] : no rash [No Focal Deficits] : no focal deficits [Oriented To Time, Place, And Person] : oriented to person, place, and time [Sensation] : the sensory exam was normal to light touch and pinprick [de-identified] : There is a well healed scar in upper outer quadrant of the breast. Seroma present. The breasts are without palpable masses, nipple discharge or suspicious skin changes. [de-identified] : able to raise both arms without pain

## 2024-01-17 NOTE — END OF VISIT
[] : Resident [FreeTextEntry3] : We had an extensive discussion of the patient's imaging, labs, and pathology, and reviewed them together; I independently evaluated these and discussed their significance with the patient and family. I have also been involved in the multidisciplinary discussion of her care with her other providers. We discussed the natural history of breast cancer and its management.

## 2024-01-18 ENCOUNTER — OUTPATIENT (OUTPATIENT)
Dept: OUTPATIENT SERVICES | Facility: HOSPITAL | Age: 49
LOS: 1 days | Discharge: HOME | End: 2024-01-18
Payer: MEDICAID

## 2024-01-18 DIAGNOSIS — C50.111 MALIGNANT NEOPLASM OF CENTRAL PORTION OF RIGHT FEMALE BREAST: ICD-10-CM

## 2024-01-18 DIAGNOSIS — Z98.891 HISTORY OF UTERINE SCAR FROM PREVIOUS SURGERY: Chronic | ICD-10-CM

## 2024-01-18 DIAGNOSIS — Z95.1 PRESENCE OF AORTOCORONARY BYPASS GRAFT: Chronic | ICD-10-CM

## 2024-01-18 LAB
ALBUMIN SERPL ELPH-MCNC: 4.8 G/DL
ALP BLD-CCNC: 76 U/L
ALT SERPL-CCNC: 23 U/L
ANION GAP SERPL CALC-SCNC: 12 MMOL/L
AST SERPL-CCNC: 20 U/L
BILIRUB SERPL-MCNC: 0.3 MG/DL
BUN SERPL-MCNC: 14 MG/DL
CALCIUM SERPL-MCNC: 9.9 MG/DL
CANCER AG27-29 SERPL-ACNC: 30 U/ML
CEA SERPL-MCNC: 1.2 NG/ML
CHLORIDE SERPL-SCNC: 103 MMOL/L
CO2 SERPL-SCNC: 25 MMOL/L
CREAT SERPL-MCNC: 0.55 MG/DL
EGFR: 114 ML/MIN/1.73M2
FSH SERPL-MCNC: 10.5 IU/L
GLUCOSE SERPL-MCNC: 115 MG/DL
HAV IGM SER QL: NONREACTIVE
HBV CORE IGM SER QL: NONREACTIVE
HBV SURFACE AG SER QL: NONREACTIVE
HCV AB SER QL: NONREACTIVE
HCV S/CO RATIO: 0.16 S/CO
LH SERPL-ACNC: 6.9 IU/L
POTASSIUM SERPL-SCNC: 4.3 MMOL/L
PROT SERPL-MCNC: 7.2 G/DL
SODIUM SERPL-SCNC: 140 MMOL/L

## 2024-01-23 ENCOUNTER — NON-APPOINTMENT (OUTPATIENT)
Age: 49
End: 2024-01-23

## 2024-01-23 PROCEDURE — 77290 THER RAD SIMULAJ FIELD CPLX: CPT | Mod: 26

## 2024-01-23 PROCEDURE — 77334 RADIATION TREATMENT AID(S): CPT | Mod: 26

## 2024-01-23 PROCEDURE — 77263 THER RADIOLOGY TX PLNG CPLX: CPT

## 2024-01-24 LAB — ESTRADIOL ULTRASENSITIVE: 10.1 PG/ML

## 2024-01-26 ENCOUNTER — OUTPATIENT (OUTPATIENT)
Dept: OUTPATIENT SERVICES | Facility: HOSPITAL | Age: 49
LOS: 1 days | End: 2024-01-26
Payer: COMMERCIAL

## 2024-01-26 ENCOUNTER — LABORATORY RESULT (OUTPATIENT)
Age: 49
End: 2024-01-26

## 2024-01-26 ENCOUNTER — APPOINTMENT (OUTPATIENT)
Dept: OBGYN | Facility: CLINIC | Age: 49
End: 2024-01-26
Payer: MEDICAID

## 2024-01-26 VITALS — BODY MASS INDEX: 27.15 KG/M2 | WEIGHT: 171 LBS | SYSTOLIC BLOOD PRESSURE: 122 MMHG | DIASTOLIC BLOOD PRESSURE: 80 MMHG

## 2024-01-26 DIAGNOSIS — Z95.1 PRESENCE OF AORTOCORONARY BYPASS GRAFT: Chronic | ICD-10-CM

## 2024-01-26 DIAGNOSIS — N76.0 ACUTE VAGINITIS: ICD-10-CM

## 2024-01-26 DIAGNOSIS — Z01.419 ENCOUNTER FOR GYNECOLOGICAL EXAMINATION (GENERAL) (ROUTINE) W/OUT ABNORMAL FINDINGS: ICD-10-CM

## 2024-01-26 DIAGNOSIS — Z98.891 HISTORY OF UTERINE SCAR FROM PREVIOUS SURGERY: Chronic | ICD-10-CM

## 2024-01-26 DIAGNOSIS — Z11.3 ENCOUNTER FOR SCREENING FOR INFECTIONS WITH A PREDOMINANTLY SEXUAL MODE OF TRANSMISSION: ICD-10-CM

## 2024-01-26 PROCEDURE — 87591 N.GONORRHOEAE DNA AMP PROB: CPT

## 2024-01-26 PROCEDURE — G0463: CPT

## 2024-01-26 PROCEDURE — 87624 HPV HI-RISK TYP POOLED RSLT: CPT

## 2024-01-26 PROCEDURE — 77334 RADIATION TREATMENT AID(S): CPT | Mod: 26

## 2024-01-26 PROCEDURE — 87340 HEPATITIS B SURFACE AG IA: CPT

## 2024-01-26 PROCEDURE — 77295 3-D RADIOTHERAPY PLAN: CPT | Mod: 26

## 2024-01-26 PROCEDURE — 36415 COLL VENOUS BLD VENIPUNCTURE: CPT

## 2024-01-26 PROCEDURE — 99386 PREV VISIT NEW AGE 40-64: CPT

## 2024-01-26 PROCEDURE — 86780 TREPONEMA PALLIDUM: CPT

## 2024-01-26 PROCEDURE — 87491 CHLMYD TRACH DNA AMP PROBE: CPT

## 2024-01-26 PROCEDURE — 86803 HEPATITIS C AB TEST: CPT

## 2024-01-26 PROCEDURE — 87389 HIV-1 AG W/HIV-1&-2 AB AG IA: CPT

## 2024-01-26 PROCEDURE — 77300 RADIATION THERAPY DOSE PLAN: CPT | Mod: 26

## 2024-01-26 NOTE — HISTORY OF PRESENT ILLNESS
[Good] : being in good health [Perimenopausal] : is perimenopausal [Menstrual Problems] : reports normal menses [Contraception] : does not use contraception [Pregnancy History] : pregnancy history: [Up to Date] : not up to date with ~his/her~ STD screening [HPV Vaccine NA Due to Age] : HPV vaccine not available to patient due to age [de-identified] : unknown [de-identified] : pt accepts screening today [Approximately ___ (Month)] : the LMP was approximately [unfilled] month(s) ago [Normal Amount/Duration] : was of a normal amount and duration [Spotting Between  Menses] : no spotting between menses [Regular Cycle Intervals] : periods have been regular [Menstrual Cramps] : no menstrual cramps [Sexually Active] : is sexually active [Monogamous] : is not monogamous

## 2024-01-26 NOTE — PHYSICAL EXAM
[Awake] : awake [Alert] : alert [Acute Distress] : no acute distress [Mass] : no breast mass [Axillary LAD] : no axillary lymphadenopathy [Nipple Discharge] : no nipple discharge [No Discharge] : no discharge [Soft] : soft [Tender] : non tender [Distended] : not distended [H/Smegaly] : no hepatosplenomegaly [Oriented x3] : oriented to person, place, and time [Depressed Mood] : not depressed [Flat Affect] : affect not flat [Labia Majora] : labia major [Normal] : uterus [Labia Minora] : labia minora [No Bleeding] : there was no active vaginal bleeding [Nabothian Cyst ___ cm] : had a [unfilled] ~Ucm nabothian cyst [Pap Obtained] : a Pap smear was performed [Motion Tenderness] : there was no cervical motion tenderness [Normal Position] : in a normal position [Tenderness] : nontender [Uterine Adnexae] : were not tender and not enlarged [FreeTextEntry6] : no fullness, tenderness or masses noted on exam

## 2024-01-27 LAB
HBV SURFACE AG SER-ACNC: SIGNIFICANT CHANGE UP
HCV AB S/CO SERPL IA: 0.14 S/CO — SIGNIFICANT CHANGE UP (ref 0–0.99)
HCV AB SERPL-IMP: SIGNIFICANT CHANGE UP
HIV 1+2 AB+HIV1 P24 AG SERPL QL IA: SIGNIFICANT CHANGE UP
T PALLIDUM AB TITR SER: NEGATIVE — SIGNIFICANT CHANGE UP

## 2024-01-29 DIAGNOSIS — Z11.3 ENCOUNTER FOR SCREENING FOR INFECTIONS WITH A PREDOMINANTLY SEXUAL MODE OF TRANSMISSION: ICD-10-CM

## 2024-01-29 DIAGNOSIS — Z01.419 ENCOUNTER FOR GYNECOLOGICAL EXAMINATION (GENERAL) (ROUTINE) WITHOUT ABNORMAL FINDINGS: ICD-10-CM

## 2024-01-29 LAB
C TRACH RRNA SPEC QL NAA+PROBE: SIGNIFICANT CHANGE UP
HPV HIGH+LOW RISK DNA PNL CVX: SIGNIFICANT CHANGE UP
N GONORRHOEA RRNA SPEC QL NAA+PROBE: SIGNIFICANT CHANGE UP
SPECIMEN SOURCE: SIGNIFICANT CHANGE UP

## 2024-01-30 LAB — CYTOLOGY SPEC DOC CYTO: SIGNIFICANT CHANGE UP

## 2024-02-02 PROCEDURE — 77280 THER RAD SIMULAJ FIELD SMPL: CPT | Mod: 26

## 2024-02-05 PROCEDURE — 77387C: CUSTOM

## 2024-02-06 ENCOUNTER — NON-APPOINTMENT (OUTPATIENT)
Age: 49
End: 2024-02-06

## 2024-02-06 VITALS
DIASTOLIC BLOOD PRESSURE: 87 MMHG | TEMPERATURE: 96.62 F | BODY MASS INDEX: 27.23 KG/M2 | SYSTOLIC BLOOD PRESSURE: 125 MMHG | WEIGHT: 169.43 LBS | OXYGEN SATURATION: 100 % | HEART RATE: 74 BPM | RESPIRATION RATE: 16 BRPM | HEIGHT: 66 IN

## 2024-02-06 PROCEDURE — 77387C: CUSTOM

## 2024-02-06 NOTE — PHYSICAL EXAM
[] : no respiratory distress [Exaggerated Use Of Accessory Muscles For Inspiration] : no accessory muscle use [Abdomen Soft] : soft [Abdomen Tenderness] : non-tender [Normal] : oriented to person, place and time, the affect was normal, the mood was normal and not anxious [de-identified] : There is a well healed scar in upper outer quadrant of the right breast. Seroma present. The breasts are without palpable masses, nipple discharge or suspicious skin changes.

## 2024-02-06 NOTE — HISTORY OF PRESENT ILLNESS
[FreeTextEntry1] : Ms. Bird is a 48 year old premenopausal female diagnosed with Anatomic and Prognostic Stage IA [id number] invasive ductal carcinoma of the right central outer breast, ER+ 90% GA+ 90% HER2- 1+. She underwent right paige-localized breast lumpectomy and SLNB on 12/6/23 with pathology showing 1.1 cm moderately differentiated invasive ductal carcinoma, margins negative (>5mm), 0/6 lymph nodes positive. Oncotype 15. Medical oncology planning AI and leupron x 3mo.  2/6/2024 She presents for on treatment visit. Completed 2/20 fractions (2/16 3DCRT and 0/4 Boost to right breast). No issues. Reviewed skin care.

## 2024-02-06 NOTE — DISEASE MANAGEMENT
[Pathological] : TNM Stage: p [IA] : IA [TTNM] : 1c [NTNM] : 0 [MTNM] : x [de-identified] : [id number] [de-identified] : 5240cGy [de-identified] : Right breast

## 2024-02-07 PROCEDURE — 77387C: CUSTOM

## 2024-02-08 PROCEDURE — 77387C: CUSTOM

## 2024-02-09 PROCEDURE — 77427 RADIATION TX MANAGEMENT X5: CPT

## 2024-02-09 PROCEDURE — 77387C: CUSTOM

## 2024-02-12 PROCEDURE — 77387C: CUSTOM

## 2024-02-12 RX ORDER — BLOOD-GLUCOSE METER
KIT MISCELLANEOUS 4 TIMES DAILY
Qty: 4 | Refills: 1 | Status: ACTIVE | COMMUNITY
Start: 2023-08-11 | End: 1900-01-01

## 2024-02-12 RX ORDER — LANCETS 30 GAUGE
EACH MISCELLANEOUS
Qty: 120 | Refills: 3 | Status: ACTIVE | COMMUNITY
Start: 2023-08-11 | End: 1900-01-01

## 2024-02-13 ENCOUNTER — NON-APPOINTMENT (OUTPATIENT)
Age: 49
End: 2024-02-13

## 2024-02-14 PROCEDURE — 77387C: CUSTOM

## 2024-02-15 PROCEDURE — 77387C: CUSTOM

## 2024-02-16 ENCOUNTER — NON-APPOINTMENT (OUTPATIENT)
Age: 49
End: 2024-02-16

## 2024-02-16 PROCEDURE — 77387C: CUSTOM

## 2024-02-16 NOTE — PHYSICAL EXAM
[] : no rash [de-identified] : There is a well healed scar in upper outer quadrant of the right breast. Seroma present. The breasts are without palpable masses, nipple discharge or suspicious skin changes.   [de-identified] : mild hyperpigmentation right breast

## 2024-02-16 NOTE — DISEASE MANAGEMENT
[TTNM] : 1c [NTNM] : 0 [MTNM] : x [de-identified] : 7475 [de-identified] : 5240cGy [de-identified] : Right breast

## 2024-02-16 NOTE — REVIEW OF SYSTEMS
[Skin Hyperpigmentation: Grade 1 - Hyperpigmentation covering <10% BSA; no psychosocial impact] : Skin Hyperpigmentation: Grade 1 - Hyperpigmentation covering <10% BSA; no psychosocial impact [Dermatitis Radiation: Grade 0] : Dermatitis Radiation: Grade 0

## 2024-02-16 NOTE — HISTORY OF PRESENT ILLNESS
[FreeTextEntry1] : Ms. Bird is a 48 year old premenopausal female diagnosed with Anatomic and Prognostic Stage IA [id number] invasive ductal carcinoma of the right central outer breast, ER+ 90% KY+ 90% HER2- 1+. She underwent right paige-localized breast lumpectomy and SLNB on 12/6/23 with pathology showing 1.1 cm moderately differentiated invasive ductal carcinoma, margins negative (>5mm), 0/6 lymph nodes positive. Oncotype 15. Medical oncology planning AI and leupron x 3mo.  2/6/2024 She presents for on treatment visit. Completed 2/20 fractions (2/16 3DCRT and 0/4 Boost to right breast). No issues. Reviewed skin care.  2/16/2024 She presents for on treatment visit. Completed 9/20 fractions (9/16 3DCRT and 0/4 Boost to right breast). Using Vanicream for skin care. Slight hyperpigmentation noted.

## 2024-02-17 PROCEDURE — 77387C: CUSTOM

## 2024-02-17 PROCEDURE — 77427 RADIATION TX MANAGEMENT X5: CPT

## 2024-02-18 NOTE — PHYSICAL EXAM
[Normal] : oriented to person, place and time, with appropriate affect [de-identified] : Right breast surgical incision (upper lateral) is c/d/i, well-approximated, minimal seroma (much improved), bruising resolving on inferior pole, no evidence of infection, no parenchymal defect.  [de-identified] : Normal respiratory effort

## 2024-02-18 NOTE — HISTORY OF PRESENT ILLNESS
[de-identified] : RAGHAV LAINEZ is a 48-year F w/ Right breast cT1 cN0 IDC/DCIS +/+/- dx'ed in 10/2023 s/p R paige loc PM, R SLNBx (0) on 23 [Path: IDC 1.1 cm, clear margins] c/b right breast seroma s/p needle aspiration on 23. Here for 2-week follow-up   23 She denies any palpable breast masses, nipple discharge or skin changes.  23 POD #15.  Pain well controlled.  She reports increased bruising and swelling in the right breast immediately postop and has remained stable.  She continues on plavix.  No fevers or chills.  s/p right breast needle aspiration w/ 60 ccs of fluid aspirated.  2024 Patient reports no fevers or chills. Reports no pain over right breast. Right arm with full ROM. Surgical incision is well healed.   Referred by: Dr. Antonietta Mcadams (PCP)  Cardiologist: Dr. Bryant  PMHx: T2DM (on insulin), CAD s/p CABG w/ Dr. Nix, HLD, and HTN PSHx:  , robotic CABG 3/2023 Meds: ASA 81, Lipitor 80, Losartan 25 mg QD, Trulicity, lantus 20 qhs, lispro 5 u qAC. Metformin 500 qAM, 1,000 qPM, Multivitamin.  NKDA Family Hx: Anal cancer (Father dx in his 70s) GYN: , menarche age 13, LMP 10/17/23. Age at first pregnancy 26.  Y (2 months) Bra size: 38 B Occupation:    Social: Smoking: Denies        ETOH: Socially

## 2024-02-18 NOTE — ASSESSMENT
[FreeTextEntry1] : RAGHAV LAINEZ is a 48-year F w/ Right breast cT1 cN0 IDC/DCIS +/+/- dx'ed in 10/2023 s/p R paige loc PM, R SLNBx (0/6) on 12/6/23. [Path: IDC 1.1 cm, clear margins] c/b right breast seroma s/p needle aspiration on 12/21/23. Here for 2-week follow-up  11/7/23 [facility] panel: Negative, VUS.   She is recovering well after surgery.  Right breast seroma improved and does not require aspiration, recommend continuing w/ scar massage.  Will proceed with adjuvant consultations.  - Medical Oncology: Scheduled to see Dr. Arturo Jarvis on 1/17/24, oncotype Dx pending.  - Radiation Oncology: Scheduled to see Dr. Jerome on 1/17/24  Next imaging:  BL Dx MG/US due 10/2024.  RTO in 3 months. She knows to return sooner if any breast abnormalities or if any breast issues/concerns arise.

## 2024-02-20 ENCOUNTER — NON-APPOINTMENT (OUTPATIENT)
Age: 49
End: 2024-02-20

## 2024-02-20 PROCEDURE — 77387C: CUSTOM

## 2024-02-20 NOTE — HISTORY OF PRESENT ILLNESS
[FreeTextEntry1] : Ms. Bird is a 48 year old premenopausal female diagnosed with Anatomic and Prognostic Stage IA [id number] invasive ductal carcinoma of the right central outer breast, ER+ 90% MA+ 90% HER2- 1+. She underwent right paige-localized breast lumpectomy and SLNB on 12/6/23 with pathology showing 1.1 cm moderately differentiated invasive ductal carcinoma, margins negative (>5mm), 0/6 lymph nodes positive. Oncotype 15. Medical oncology planning AI and leupron x 3mo.  2/6/2024 She presents for on treatment visit. Completed 2/20 fractions (2/16 3DCRT and 0/4 Boost to right breast). No issues. Reviewed skin care.  2/16/2024 She presents for on treatment visit. Completed 9/20 fractions (9/16 3DCRT and 0/4 Boost to right breast). Using Vanicream for skin care. Slight hyperpigmentation noted.  2/20/2024 She presents for on treatment visit. Completed 11/20 fractions (11/16 3DCRT and 0/4 Boost to right breast). Doing well. Using Vanicream.

## 2024-02-20 NOTE — PHYSICAL EXAM
[Exaggerated Use Of Accessory Muscles For Inspiration] : no accessory muscle use [Abdomen Soft] : soft [Abdomen Tenderness] : non-tender [] : no rash [Normal] : oriented to person, place and time, the affect was normal, the mood was normal and not anxious [de-identified] : There is a well healed scar in upper outer quadrant of the right breast. Seroma present. The breasts are without palpable masses, nipple discharge or suspicious skin changes.   [de-identified] : mild hyperpigmentation right breast

## 2024-02-20 NOTE — DISEASE MANAGEMENT
[Pathological] : TNM Stage: p [IA] : IA [TTNM] : 1c [NTNM] : 0 [MTNM] : x [de-identified] : 5676 [de-identified] : 5240cGy [de-identified] : Right breast

## 2024-02-21 PROCEDURE — 77387C: CUSTOM

## 2024-02-22 ENCOUNTER — NON-APPOINTMENT (OUTPATIENT)
Age: 49
End: 2024-02-22

## 2024-02-22 PROCEDURE — 77280 THER RAD SIMULAJ FIELD SMPL: CPT | Mod: 26

## 2024-02-22 PROCEDURE — 77387C: CUSTOM

## 2024-02-23 PROCEDURE — 77387C: CUSTOM

## 2024-02-26 PROCEDURE — 77427 RADIATION TX MANAGEMENT X5: CPT

## 2024-02-26 PROCEDURE — 77387C: CUSTOM

## 2024-02-27 ENCOUNTER — NON-APPOINTMENT (OUTPATIENT)
Age: 49
End: 2024-02-27

## 2024-02-27 VITALS
HEART RATE: 92 BPM | RESPIRATION RATE: 16 BRPM | OXYGEN SATURATION: 100 % | BODY MASS INDEX: 26.93 KG/M2 | DIASTOLIC BLOOD PRESSURE: 75 MMHG | TEMPERATURE: 96 F | HEIGHT: 66 IN | WEIGHT: 167.55 LBS | SYSTOLIC BLOOD PRESSURE: 112 MMHG

## 2024-02-27 DIAGNOSIS — L29.9 PRURITUS, UNSPECIFIED: ICD-10-CM

## 2024-02-27 PROCEDURE — 77387C: CUSTOM

## 2024-02-27 RX ORDER — MOMETASONE FUROATE 1 MG/G
0.1 CREAM TOPICAL TWICE DAILY
Qty: 1 | Refills: 1 | Status: ACTIVE | COMMUNITY
Start: 2024-02-27 | End: 1900-01-01

## 2024-02-27 NOTE — REVIEW OF SYSTEMS
[Dermatitis Radiation: Grade 0] : Dermatitis Radiation: Grade 0 [Skin Hyperpigmentation: Grade 1 - Hyperpigmentation covering <10% BSA; no psychosocial impact] : Skin Hyperpigmentation: Grade 1 - Hyperpigmentation covering <10% BSA; no psychosocial impact

## 2024-02-27 NOTE — VITALS
[90: Able to carry normal activity; minor signs or symptoms of disease.] : 90: Able to carry normal activity; minor signs or symptoms of disease.  [Maximal Pain Intensity: 0/10] : 0/10 [ECOG Performance Status: 1 - Restricted in physically strenuous activity but ambulatory and able to carry out work of a light or sedentary nature] : Performance Status: 1 - Restricted in physically strenuous activity but ambulatory and able to carry out work of a light or sedentary nature, e.g., light house work, office work [Maximal Pain Intensity: 2/10] : 2/10 [Pain Location: ___] : Pain Location: [unfilled]

## 2024-02-28 ENCOUNTER — APPOINTMENT (OUTPATIENT)
Dept: HEMATOLOGY ONCOLOGY | Facility: CLINIC | Age: 49
End: 2024-02-28
Payer: MEDICAID

## 2024-02-28 VITALS
TEMPERATURE: 98.1 F | SYSTOLIC BLOOD PRESSURE: 128 MMHG | BODY MASS INDEX: 27.04 KG/M2 | RESPIRATION RATE: 16 BRPM | DIASTOLIC BLOOD PRESSURE: 85 MMHG | WEIGHT: 167.55 LBS | HEART RATE: 95 BPM | OXYGEN SATURATION: 89 %

## 2024-02-28 PROCEDURE — 99214 OFFICE O/P EST MOD 30 MIN: CPT

## 2024-02-28 PROCEDURE — 77387C: CUSTOM

## 2024-02-29 PROCEDURE — 77387C: CUSTOM

## 2024-02-29 NOTE — PHYSICAL EXAM
[Exaggerated Use Of Accessory Muscles For Inspiration] : no accessory muscle use [Abdomen Soft] : soft [Abdomen Tenderness] : non-tender [] : no rash [Normal] : oriented to person, place and time, the affect was normal, the mood was normal and not anxious [de-identified] : There is a well healed scar in upper outer quadrant of the right breast. Seroma present. The breasts are without palpable masses, nipple discharge or suspicious skin changes.   [de-identified] : moderate hyperpigmentation right breast

## 2024-02-29 NOTE — DISEASE MANAGEMENT
[Pathological] : TNM Stage: p [IA] : IA [TTNM] : 1c [NTNM] : 0 [MTNM] : x [de-identified] : 3637 [de-identified] : 5240cGy [de-identified] : Right breast

## 2024-02-29 NOTE — HISTORY OF PRESENT ILLNESS
[FreeTextEntry1] : Ms. Bird is a 48 year old premenopausal female diagnosed with Anatomic and Prognostic Stage IA [id number] invasive ductal carcinoma of the right central outer breast, ER+ 90% ME+ 90% HER2- 1+. She underwent right paige-localized breast lumpectomy and SLNB on 12/6/23 with pathology showing 1.1 cm moderately differentiated invasive ductal carcinoma, margins negative (>5mm), 0/6 lymph nodes positive. Oncotype 15. Medical oncology planning AI and leupron x 3mo.  2/6/2024 She presents for on treatment visit. Completed 2/20 fractions (2/16 3DCRT and 0/4 Boost to right breast). No issues. Reviewed skin care.  2/16/2024 She presents for on treatment visit. Completed 9/20 fractions (9/16 3DCRT and 0/4 Boost to right breast). Using Vanicream for skin care. Slight hyperpigmentation noted.  2/20/2024 She presents for on treatment visit. Completed 11/20 fractions (11/16 3DCRT and 0/4 Boost to right breast). Doing well. Using Vanicream.  2/27/2024 She presents for on treatment visit. Completed 16/20 fractions (16/16 3DCRT and 0/4 Boost to right breast). Using Vanicream. She reports itching and nipple sensitivity.  Trail mometasone and addition of Aquaphor to skin care.  notes increased hyperpigmentation to upper inner quadrant of treated right breast.

## 2024-03-01 VITALS
TEMPERATURE: 94.7 F | BODY MASS INDEX: 26.91 KG/M2 | HEART RATE: 96 BPM | DIASTOLIC BLOOD PRESSURE: 85 MMHG | OXYGEN SATURATION: 100 % | SYSTOLIC BLOOD PRESSURE: 128 MMHG | HEIGHT: 66 IN | WEIGHT: 167.44 LBS | RESPIRATION RATE: 16 BRPM

## 2024-03-01 PROCEDURE — 77387C: CUSTOM

## 2024-03-04 PROCEDURE — 77387C: CUSTOM

## 2024-03-04 PROCEDURE — 77427 RADIATION TX MANAGEMENT X5: CPT

## 2024-03-08 ENCOUNTER — APPOINTMENT (OUTPATIENT)
Dept: INTERNAL MEDICINE | Facility: CLINIC | Age: 49
End: 2024-03-08
Payer: MEDICAID

## 2024-03-08 ENCOUNTER — OUTPATIENT (OUTPATIENT)
Dept: OUTPATIENT SERVICES | Facility: HOSPITAL | Age: 49
LOS: 1 days | End: 2024-03-08
Payer: COMMERCIAL

## 2024-03-08 VITALS
DIASTOLIC BLOOD PRESSURE: 78 MMHG | BODY MASS INDEX: 27 KG/M2 | HEART RATE: 71 BPM | OXYGEN SATURATION: 96 % | HEIGHT: 66 IN | SYSTOLIC BLOOD PRESSURE: 124 MMHG | WEIGHT: 168 LBS

## 2024-03-08 DIAGNOSIS — Z86.39 PERSONAL HISTORY OF OTHER ENDOCRINE, NUTRITIONAL AND METABOLIC DISEASE: ICD-10-CM

## 2024-03-08 DIAGNOSIS — E11.9 TYPE 2 DIABETES MELLITUS W/OUT COMPLICATIONS: ICD-10-CM

## 2024-03-08 DIAGNOSIS — Z79.4 TYPE 2 DIABETES MELLITUS W/OUT COMPLICATIONS: ICD-10-CM

## 2024-03-08 DIAGNOSIS — I10 ESSENTIAL (PRIMARY) HYPERTENSION: ICD-10-CM

## 2024-03-08 DIAGNOSIS — E11.9 TYPE 2 DIABETES MELLITUS WITHOUT COMPLICATIONS: ICD-10-CM

## 2024-03-08 DIAGNOSIS — E11.8 TYPE 2 DIABETES MELLITUS WITH UNSPECIFIED COMPLICATIONS: ICD-10-CM

## 2024-03-08 DIAGNOSIS — Z95.1 PRESENCE OF AORTOCORONARY BYPASS GRAFT: Chronic | ICD-10-CM

## 2024-03-08 DIAGNOSIS — Z98.891 HISTORY OF UTERINE SCAR FROM PREVIOUS SURGERY: Chronic | ICD-10-CM

## 2024-03-08 PROCEDURE — 83036 HEMOGLOBIN GLYCOSYLATED A1C: CPT

## 2024-03-08 PROCEDURE — G0463: CPT

## 2024-03-08 PROCEDURE — 99213 OFFICE O/P EST LOW 20 MIN: CPT | Mod: GC

## 2024-03-08 RX ORDER — DULAGLUTIDE 3 MG/.5ML
3 INJECTION, SOLUTION SUBCUTANEOUS
Qty: 3 | Refills: 1 | Status: ACTIVE | COMMUNITY
Start: 2023-11-22 | End: 1900-01-01

## 2024-03-08 RX ORDER — ATORVASTATIN CALCIUM 80 MG/1
80 TABLET, FILM COATED ORAL
Qty: 90 | Refills: 3 | Status: ACTIVE | COMMUNITY
Start: 1900-01-01 | End: 1900-01-01

## 2024-03-08 RX ORDER — FLASH GLUCOSE SENSOR
KIT MISCELLANEOUS
Qty: 2 | Refills: 5 | Status: ACTIVE | COMMUNITY
Start: 2024-01-04 | End: 1900-01-01

## 2024-03-08 RX ORDER — METFORMIN HYDROCHLORIDE 500 MG/1
500 TABLET, COATED ORAL
Qty: 360 | Refills: 3 | Status: ACTIVE | COMMUNITY
Start: 2023-09-15 | End: 1900-01-01

## 2024-03-11 ENCOUNTER — APPOINTMENT (OUTPATIENT)
Dept: HEMATOLOGY ONCOLOGY | Facility: CLINIC | Age: 49
End: 2024-03-11
Payer: MEDICAID

## 2024-03-11 VITALS
TEMPERATURE: 98 F | RESPIRATION RATE: 17 BRPM | WEIGHT: 170.4 LBS | DIASTOLIC BLOOD PRESSURE: 76 MMHG | SYSTOLIC BLOOD PRESSURE: 119 MMHG | BODY MASS INDEX: 27.51 KG/M2 | HEART RATE: 90 BPM | OXYGEN SATURATION: 97 %

## 2024-03-11 DIAGNOSIS — C50.919 MALIGNANT NEOPLASM OF UNSPECIFIED SITE OF UNSPECIFIED FEMALE BREAST: ICD-10-CM

## 2024-03-11 PROCEDURE — 99214 OFFICE O/P EST MOD 30 MIN: CPT

## 2024-03-12 ENCOUNTER — NON-APPOINTMENT (OUTPATIENT)
Age: 49
End: 2024-03-12

## 2024-03-12 NOTE — HISTORY OF PRESENT ILLNESS
[FreeTextEntry1] : Follow up [de-identified] : 48F with T2DM, CAD s/p CABG, HLD, and HTN who presents for follow up. LV 12/2023 for follow up.  #CAD Presented with migraines and CP, trops negative but found to have LAD  s/p CABG 3/2023 Unable to start cardiac rehab as unable to schedule with work hours Follows with cards - On ASA, plavix, lipitor 80, losartan 25 mg daily  #T2DM A1c 7.4 11/2023 -> 6.9 3/2024 Prior to hospitalization 3/2023, was on metformin monotherapy Urine albumin/cr wnl 8/2023 FS: 110-130s fasting, 150-160s during the day. No symptomatic hypoglycemia. Off basal bolus since 10/2023 Has CGM however has not been consistently wearing since she had been completing RT. Started again this week. - metformin 1g BID - Trulicity 3 mg qweek (increased from 1.5 1/2024 by endocrine) - Losartan and lipitor as above - s/p nutrition referral - Follows w/ endo, next appt 5/2024  #R breast cancer #Pre-op evaluation 10/2023 bx with DCIS S/p R partial mastectomy 12/6/2023. No LN involvement. Did require seroma drainage as last post-op visit. Denies fevers or erythema over surgical site. Completed RT 20 fx. Pending med onc follow up.  #HCM Cervical ca: pap smear and HPV wnl 6/2023, UTD Breast ca: mammogram/US breast 2023 R breast BIRADS 5 -> DCIS Colon ca: never had colonoscopy, difficulty scheduling Immunizations: S/p 2 dose Pfizer COVID, TDAP (w/n last 10 years), PCV20 2023, flu (2023).  Pt inquired re SW referral for rent/food assistance.

## 2024-03-12 NOTE — ASSESSMENT
[FreeTextEntry1] : 48F with T2DM, CAD s/p CABG, HLD, and HTN who presents for follow up. LV 12/2023 for follow up.  #CAD Presented with migraines and CP, trops negative but found to have LAD  s/p CABG 3/2023 Unable to start cardiac rehab as unable to schedule with work hours Follows with cards - On ASA, plavix, lipitor 80, losartan 25 mg daily  #T2DM A1c 7.4 11/2023 -> 6.9 3/2024 Prior to hospitalization 3/2023, was on metformin monotherapy Urine albumin/cr wnl 8/2023 FS: 110-130s fasting, 150-160s during the day. No symptomatic hypoglycemia. Off basal bolus since 10/2023 Has CGM however has not been consistently wearing since she had been completing RT. Started again this week. - metformin 1g BID - Trulicity 3 mg qweek (increased from 1.5 1/2024 by endocrine) - Losartan and lipitor as above - s/p nutrition referral - Follows w/ endo, next appt 5/2024  #R breast cancer #Pre-op evaluation 10/2023 bx with DCIS S/p R partial mastectomy 12/6/2023. No LN involvement. Did require seroma drainage as last post-op visit. Denies fevers or erythema over surgical site. Completed RT 20 fx. Pending med onc follow up.  #HCM Cervical ca: pap smear and HPV wnl 6/2023, UTD Breast ca: mammogram/US breast 2023 R breast BIRADS 5 -> DCIS Colon ca: never had colonoscopy, difficulty scheduling - referred again Immunizations: S/p 2 dose Pfizer COVID, TDAP (w/n last 10 years), PCV20 2023, flu (2023).  Referred to  for rent/food assistance programs  RTC 8/2024 for CPE

## 2024-03-22 ENCOUNTER — APPOINTMENT (OUTPATIENT)
Dept: CARDIOLOGY | Facility: CLINIC | Age: 49
End: 2024-03-22
Payer: MEDICAID

## 2024-03-22 VITALS
HEART RATE: 77 BPM | OXYGEN SATURATION: 100 % | HEIGHT: 66 IN | WEIGHT: 170 LBS | BODY MASS INDEX: 27.32 KG/M2 | SYSTOLIC BLOOD PRESSURE: 110 MMHG | DIASTOLIC BLOOD PRESSURE: 75 MMHG

## 2024-03-22 PROCEDURE — 93000 ELECTROCARDIOGRAM COMPLETE: CPT

## 2024-03-22 PROCEDURE — 99214 OFFICE O/P EST MOD 30 MIN: CPT | Mod: 25

## 2024-03-22 RX ORDER — ONDANSETRON 4 MG/1
4 TABLET, ORALLY DISINTEGRATING ORAL
Qty: 30 | Refills: 0 | Status: DISCONTINUED | COMMUNITY
Start: 2024-03-08 | End: 2024-03-22

## 2024-03-22 RX ORDER — CLOPIDOGREL BISULFATE 75 MG/1
75 TABLET, FILM COATED ORAL
Qty: 90 | Refills: 0 | Status: DISCONTINUED | COMMUNITY
Start: 2023-12-22 | End: 2024-03-22

## 2024-03-22 NOTE — CARDIOLOGY SUMMARY
[de-identified] : Sinus Rhythm  -Right bundle branch block with left axis -bifascicular block.  ABNORMAL

## 2024-03-22 NOTE — HISTORY OF PRESENT ILLNESS
[FreeTextEntry1] :  Pt is 48 year old female  with 15 year hx of DM and recent dx htn and elevated cholesterol.  came to US last year.  she presented to hosp with chest pain and found to have  of LAD and underwent robotic CABG with Dr. Nix.  no chest pain, but little sticking pain. has drainage and swelling from surgical site and being followed by Dr. Nix.   never smoked rare social alcohol hx of c section nkda father had  CABG two years ago   5/22/2023  no new complaints, blood work reviewed with pt.  will be starting cardiac rehab.  only issue is mild pedal edema.  9/1/2023  no new complaints.  never started cardiac rehab.  she is walking at home.  labs reviewed cholesterol is at goal  12/22/2023  completed breast surgery no cardiac issues.. will prob need course of rad trx. probably no chemotherapy but pt will know for sure after her follow up.  3/22/2024  completed RT. no cp, sob or edema.  sugar is well controlled.  chemo plan noted.,

## 2024-03-22 NOTE — DISCUSSION/SUMMARY
[Stable] : stable [Coronary Artery Disease] : coronary artery disease [Hyperlipidemia] : hyperlipidemia [Hypertension] : hypertension [Responding to Treatment] : responding to treatment [___ Month(s)] : in [unfilled] month(s) [None] : There are no changes in medication management [FreeTextEntry1] : 9/1/2023  s/p robotic CABG by Dinah Luz.  no chest pain.  BP, lipds are well controlled.  needs better diabetic control.  maintain current meds. f/u 3 months  12/1/2023  spoke with CT surgery.  Pt may stop plavix 5 days in advance of mastectomy.  3/22/2024  s/p breast surgery and RT.  no problems..  echo with strain for pre chemo assessment.  f/u 3 months [EKG obtained to assist in diagnosis and management of assessed problem(s)] : EKG obtained to assist in diagnosis and management of assessed problem(s)

## 2024-03-22 NOTE — REASON FOR VISIT
[Symptom and Test Evaluation] : symptom and test evaluation [Hyperlipidemia] : hyperlipidemia [Coronary Artery Disease] : coronary artery disease [Hypertension] : hypertension

## 2024-03-28 ENCOUNTER — APPOINTMENT (OUTPATIENT)
Dept: HEMATOLOGY ONCOLOGY | Facility: CLINIC | Age: 49
End: 2024-03-28

## 2024-04-05 ENCOUNTER — NON-APPOINTMENT (OUTPATIENT)
Age: 49
End: 2024-04-05

## 2024-04-05 ENCOUNTER — APPOINTMENT (OUTPATIENT)
Dept: RADIATION ONCOLOGY | Facility: CLINIC | Age: 49
End: 2024-04-05
Payer: COMMERCIAL

## 2024-04-05 VITALS
WEIGHT: 168.76 LBS | DIASTOLIC BLOOD PRESSURE: 79 MMHG | TEMPERATURE: 95.2 F | HEART RATE: 85 BPM | SYSTOLIC BLOOD PRESSURE: 122 MMHG | BODY MASS INDEX: 27.24 KG/M2 | RESPIRATION RATE: 17 BRPM | OXYGEN SATURATION: 100 %

## 2024-04-05 PROCEDURE — 99024 POSTOP FOLLOW-UP VISIT: CPT

## 2024-04-05 NOTE — PHYSICAL EXAM
[Sclera] : the sclera and conjunctiva were normal [Symmetric] : breasts are symmetric [Breast Palpation Mass] : no palpable masses [Breast Abnormal Lactation (Galactorrhea)] : no nipple discharge [No UE Edema] : there is no upper extremity edema [Abdomen Soft] : soft [Normal] : normal spine exam without palpable tenderness, no kyphosis or scoliosis [Musculoskeletal - Swelling] : no joint swelling [Skin Lesions] : no skin lesions [No Focal Deficits] : no focal deficits [Oriented To Time, Place, And Person] : oriented to person, place, and time [de-identified] : There is right breast edema and asymmetry R>L. There is a well healed scar in upper outer quadrant of the right breast. The breasts are without palpable masses, nipple discharge or suspicious skin changes.   [de-identified] : FROM of right shoulder; no RUE lympedema [de-identified] : mixed hyperpigmentation and hypopigmentation of the right breast

## 2024-04-16 ENCOUNTER — APPOINTMENT (OUTPATIENT)
Dept: SURGICAL ONCOLOGY | Facility: CLINIC | Age: 49
End: 2024-04-16
Payer: COMMERCIAL

## 2024-04-16 VITALS
DIASTOLIC BLOOD PRESSURE: 78 MMHG | HEIGHT: 66 IN | OXYGEN SATURATION: 98 % | RESPIRATION RATE: 17 BRPM | WEIGHT: 155 LBS | HEART RATE: 68 BPM | BODY MASS INDEX: 24.91 KG/M2 | SYSTOLIC BLOOD PRESSURE: 126 MMHG

## 2024-04-16 PROCEDURE — 99213 OFFICE O/P EST LOW 20 MIN: CPT

## 2024-04-17 ENCOUNTER — APPOINTMENT (OUTPATIENT)
Dept: OPHTHALMOLOGY | Facility: CLINIC | Age: 49
End: 2024-04-17

## 2024-04-24 ENCOUNTER — OUTPATIENT (OUTPATIENT)
Dept: OUTPATIENT SERVICES | Facility: HOSPITAL | Age: 49
LOS: 1 days | Discharge: HOME | End: 2024-04-24

## 2024-04-24 DIAGNOSIS — Z98.891 HISTORY OF UTERINE SCAR FROM PREVIOUS SURGERY: Chronic | ICD-10-CM

## 2024-04-24 DIAGNOSIS — Z95.1 PRESENCE OF AORTOCORONARY BYPASS GRAFT: Chronic | ICD-10-CM

## 2024-04-24 DIAGNOSIS — C50.919 MALIGNANT NEOPLASM OF UNSPECIFIED SITE OF UNSPECIFIED FEMALE BREAST: ICD-10-CM

## 2024-05-02 ENCOUNTER — APPOINTMENT (OUTPATIENT)
Dept: HEMATOLOGY ONCOLOGY | Facility: CLINIC | Age: 49
End: 2024-05-02

## 2024-05-02 PROBLEM — C50.919 BREAST CANCER: Status: ACTIVE | Noted: 2023-11-01

## 2024-05-02 NOTE — HISTORY OF PRESENT ILLNESS
[de-identified] : 48 year old female presents today for initial consultation for newly diagnosed right breast cancer.   Patient had abnormal mammogram 2023, revealing a 7mm spiculated mass in right upper outer quadrant.   Core biopsy done on 10/27/2023, revealing invasive moderately differentiated ductal carcinoma. ER 90%+. GA 90%+, HER-2 negative.   Was seen by Dr Reyes, s/p right breast localized partial mastectomy, right SLNBx on 2023. Path revealed invasive ductal carcinoma, moderately differentiated, all lymph nodes negative.  ER positive 81-90%, GA positive 81-90%, HER-2 Negative   Today, Nnamdi is feeling well overall, presents today with her fiance. Has mild pain to right breast at site of seroma, she continues to massage the area with relief. She still has her menstrual cycle every month.   Cardiologist: Dr. Bryant PMHx: T2DM (on insulin), CAD s/p CABG w/ Dr. Nix, HLD, and HTN PSHx:  , robotic CABG 3/2023 Meds: ASA 81, Lipitor 80, Losartan 25 mg QD, Trulicity weekly, Metformin 1000mg BID, Multivitamin. NKDA Family Hx: Colon cancer (Father dx in his 70s) SocialHx: Denies smoking history, uses ETOH in social settings [de-identified] : Nnamdi presents for follow up. She feels well overall. She is undergoing adjuvant RT with Dr. Jerome on 2/27/24. She is feeling well and had mild fatigue during the RT, and some skin irritation near the nipple; otherwise tolerated well. She denies fevers, chills, n/v, abdominal pain, neuropathy, vaginal discharge or bleeding, urinary symptoms, cough, CP, SOB.

## 2024-05-02 NOTE — HISTORY OF PRESENT ILLNESS
[de-identified] : 48 year old female presents today for initial consultation for newly diagnosed right breast cancer.   Patient had abnormal mammogram 2023, revealing a 7mm spiculated mass in right upper outer quadrant.   Core biopsy done on 10/27/2023, revealing invasive moderately differentiated ductal carcinoma. ER 90%+. CT 90%+, HER-2 negative.   Was seen by Dr Reyes, s/p right breast localized partial mastectomy, right SLNBx on 2023. Path revealed invasive ductal carcinoma, moderately differentiated, all lymph nodes negative.  ER positive 81-90%, CT positive 81-90%, HER-2 Negative   Today, Nnamdi is feeling well overall, presents today with her fiance. Has mild pain to right breast at site of seroma, she continues to massage the area with relief. She still has her menstrual cycle every month.   Cardiologist: Dr. Bryant PMHx: T2DM (on insulin), CAD s/p CABG w/ Dr. Nix, HLD, and HTN PSHx:  , robotic CABG 3/2023 Meds: ASA 81, Lipitor 80, Losartan 25 mg QD, Trulicity weekly, Metformin 1000mg BID, Multivitamin. NKDA Family Hx: Colon cancer (Father dx in his 70s) SocialHx: Denies smoking history, uses ETOH in social settings [de-identified] : Nnamdi presents for follow up. She feels well overall. She completed adjuvant RT with Dr. Jerome on 3/4/24. She is feeling well and had mild fatigue during the RT, and some skin irritation near the nipple; otherwise tolerated well overall. She denies fevers, chills, n/v, abdominal pain, neuropathy, vaginal discharge or bleeding, urinary symptoms, cough, CP, SOB.

## 2024-05-02 NOTE — HISTORY OF PRESENT ILLNESS
[de-identified] : Referred by Dr. Sylvia Reyes  48 year old female presents today for initial consultation for newly diagnosed right breast cancer.   Patient had abnormal mammogram 2023, revealing a 7mm spiculated mass in right upper outer quadrant.   Core biopsy done on 10/27/2023, revealing invasive moderately differentiated ductal carcinoma. ER 90%+. NM 90%+, HER-2 negative.   Was seen by Dr Reyes in breast surgery and now s/p right breast localized partial mastectomy, right SLNBx on 2023. Path revealed invasive ductal carcinoma, moderately differentiated, all lymph nodes negative.  ER positive 81-90%, NM positive 81-90%, HER-2 Negative   Today, Anoradha is feeling well overall, presents today with her fiance. Has mild pain to right breast at site of seroma, she continues to massage the area with relief. She still has her menstrual cycle every month.   Cardiologist: Dr. Bryant PMHx: T2DM (on insulin), CAD s/p CABG w/ Dr. Nix, HLD, and HTN PSHx:  , robotic CABG 3/2023 Meds: ASA 81, Lipitor 80, Losartan 25 mg QD, Trulicity weekly, Metformin 1000mg BID, Multivitamin. NKDA Family Hx: Colon cancer (Father dx in his 70s) SocialHx: Denies smoking history, uses ETOH in social settings

## 2024-05-06 ENCOUNTER — APPOINTMENT (OUTPATIENT)
Dept: CV DIAGNOSITCS | Facility: HOSPITAL | Age: 49
End: 2024-05-06

## 2024-05-06 ENCOUNTER — OUTPATIENT (OUTPATIENT)
Dept: OUTPATIENT SERVICES | Facility: HOSPITAL | Age: 49
LOS: 1 days | End: 2024-05-06
Payer: MEDICAID

## 2024-05-06 ENCOUNTER — RESULT REVIEW (OUTPATIENT)
Age: 49
End: 2024-05-06

## 2024-05-06 DIAGNOSIS — Z98.891 HISTORY OF UTERINE SCAR FROM PREVIOUS SURGERY: Chronic | ICD-10-CM

## 2024-05-06 DIAGNOSIS — C50.919 MALIGNANT NEOPLASM OF UNSPECIFIED SITE OF UNSPECIFIED FEMALE BREAST: ICD-10-CM

## 2024-05-06 DIAGNOSIS — Z95.1 PRESENCE OF AORTOCORONARY BYPASS GRAFT: Chronic | ICD-10-CM

## 2024-05-06 PROCEDURE — 93306 TTE W/DOPPLER COMPLETE: CPT

## 2024-05-06 PROCEDURE — 76376 3D RENDER W/INTRP POSTPROCES: CPT

## 2024-05-06 PROCEDURE — 76376 3D RENDER W/INTRP POSTPROCES: CPT | Mod: 26

## 2024-05-06 PROCEDURE — 93306 TTE W/DOPPLER COMPLETE: CPT | Mod: 26

## 2024-05-06 PROCEDURE — 93356 MYOCRD STRAIN IMG SPCKL TRCK: CPT

## 2024-05-09 ENCOUNTER — APPOINTMENT (OUTPATIENT)
Dept: HEMATOLOGY ONCOLOGY | Facility: CLINIC | Age: 49
End: 2024-05-09
Payer: COMMERCIAL

## 2024-05-09 VITALS
WEIGHT: 171.96 LBS | RESPIRATION RATE: 16 BRPM | HEART RATE: 100 BPM | SYSTOLIC BLOOD PRESSURE: 109 MMHG | BODY MASS INDEX: 27.76 KG/M2 | DIASTOLIC BLOOD PRESSURE: 71 MMHG | TEMPERATURE: 97.3 F | OXYGEN SATURATION: 97 %

## 2024-05-09 DIAGNOSIS — C50.919 MALIGNANT NEOPLASM OF UNSPECIFIED SITE OF UNSPECIFIED FEMALE BREAST: ICD-10-CM

## 2024-05-09 PROCEDURE — G2211 COMPLEX E/M VISIT ADD ON: CPT | Mod: NC,1L

## 2024-05-09 PROCEDURE — 99215 OFFICE O/P EST HI 40 MIN: CPT

## 2024-05-11 PROBLEM — C50.919 INVASIVE DUCTAL CARCINOMA OF BREAST, FEMALE: Status: ACTIVE | Noted: 2023-11-10

## 2024-05-11 NOTE — PHYSICAL EXAM
[Fully active, able to carry on all pre-disease performance without restriction] : Status 0 - Fully active, able to carry on all pre-disease performance without restriction [Normal] : affect appropriate [de-identified] : left breast with no masses, skin dimpling or nipple retraction; right breast with post RT skin changes, well healed amaury-areolar incision

## 2024-05-11 NOTE — ASSESSMENT
[FreeTextEntry1] : 48 yo premenopausal woman with Stage 1A right breast IDC ER+TX+Her2 neg and Oncotype 15 - no role for adjuvant chemotherapy - completed adjuvant RT 5240 cGy with Dr. Jerome in 3/2024 - was referred to [facility]ertility [facility] (Dr. Randi Goldberg) but as insurance does not cover, she opted to hold off. - reports that her fiance is more interested in attempt for pregnancy.  - Advised patient that with use of tamoxifen, there is data for initiation of therapy for 2 years, taking break for up to 18 months for pregnancy/delivery/lactation and then resuming to complete 5-7 years of therapy without increasing risk of recurrence. However, given her history of cardiac disease including CABG, would be very cautious with the use of tamoxifen due to increased risk of cardiovascular events with its use - alternatively, would opt to start Lupron monthly injections for the next 2 months, then check Estradiol level and if suppressed, would start aromatase inhibitor - would start with anastrozole 1 mg daily with plan for about 7 years of therapy - risks/benefits/side effects including but not limited to increased risk of osteoporosis, worsening arthralgia/myalgia, rise in LFTs that will need to be monitored every 6 months and worsening lipid profile that will need to be monitored by PMD - scheduled for Lupron next week - Patient had the opportunity to have all their questions answered to their satisfaction - f/u in 2 months

## 2024-05-11 NOTE — CONSULT LETTER
[Dear  ___] : Dear  [unfilled], [Courtesy Letter:] : I had the pleasure of seeing your patient, [unfilled], in my office today. [Consult Closing:] : Thank you very much for allowing me to participate in the care of this patient.  If you have any questions, please do not hesitate to contact me. [Sincerely,] : Sincerely, [FreeTextEntry3] : Madai Fay MD  Division of Hematology/[place] [address]  Tel: [phone] Fax: [phone] Email: [email or web address]

## 2024-05-14 ENCOUNTER — RX RENEWAL (OUTPATIENT)
Age: 49
End: 2024-05-14

## 2024-05-15 ENCOUNTER — NON-APPOINTMENT (OUTPATIENT)
Age: 49
End: 2024-05-15

## 2024-05-15 ENCOUNTER — APPOINTMENT (OUTPATIENT)
Dept: OPHTHALMOLOGY | Facility: CLINIC | Age: 49
End: 2024-05-15
Payer: COMMERCIAL

## 2024-05-15 PROCEDURE — 92020 GONIOSCOPY: CPT

## 2024-05-15 PROCEDURE — 92004 COMPRE OPH EXAM NEW PT 1/>: CPT

## 2024-05-15 PROCEDURE — 92015 DETERMINE REFRACTIVE STATE: CPT

## 2024-05-22 ENCOUNTER — APPOINTMENT (OUTPATIENT)
Dept: INFUSION THERAPY | Facility: HOSPITAL | Age: 49
End: 2024-05-22

## 2024-05-29 NOTE — ED CDU PROVIDER INITIAL DAY NOTE - WR ORDER DATE AND TIME 1
Denton Turpin Patient Age: 10 month old  MESSAGE:   Mom calling regarding appointment on June 6  that was bump  Mom would like to be seen before first available        WEIGHT AND HEIGHT:   Wt Readings from Last 1 Encounters:   04/25/24 9.072 kg (20 lb) (50%, Z= 0.00)*     * Growth percentiles are based on WHO (Boys, 0-2 years) data.     Ht Readings from Last 1 Encounters:   04/25/24 27\" (68.6 cm) (3%, Z= -1.87)*     * Growth percentiles are based on WHO (Boys, 0-2 years) data.     BMI Readings from Last 1 Encounters:   04/25/24 19.29 kg/m² (93%, Z= 1.47)*     * Growth percentiles are based on WHO (Boys, 0-2 years) data.       ALLERGIES:  Egg white   (food or med) and Milk   (food or med)  Current Outpatient Medications   Medication Sig Dispense Refill    cetirizine (ZYRTEC) 1 MG/ML solution GIVE \"DAXTON\" 2.5 ML BY MOUTH DAILY 75 mL 1    triamcinolone (ARISTOCORT) 0.1 % ointment Apply twice daily as needed up to 2 weeks consecutively in non-sensitive, affected skin areas. 60 g 0    hydroCORTisone (CORTIZONE) 2.5 % ointment Apply twice daily as needed up to 2 weeks consecutively in any affected skin areas. (Patient not taking: Reported on 3/19/2024) 30 g 1     No current facility-administered medications for this visit.     PHARMACY to use: ask patient           Pharmacy preference(s) on file:   [id number] [place], [place] - [facility]  AT [facility]  [address] [place] [id number]  Phone: [phone] Fax: [phone]      CALL BACK INFO: Ok to leave response (including medical information) with family member or on answering machine  ROUTING: Patient's physician/staff        PCP: Viviana Pagan MD         INS: Payor: [facility] MEDICAID / Plan: [facility] MEDICAID HMO / Product Type: T19 HMO   PATIENT ADDRESS:  [address] [place] [id number]    17-Mar-2023 22:25

## 2024-06-19 ENCOUNTER — APPOINTMENT (OUTPATIENT)
Dept: INFUSION THERAPY | Facility: HOSPITAL | Age: 49
End: 2024-06-19

## 2024-06-21 ENCOUNTER — NON-APPOINTMENT (OUTPATIENT)
Age: 49
End: 2024-06-21

## 2024-06-21 ENCOUNTER — APPOINTMENT (OUTPATIENT)
Dept: CARDIOLOGY | Facility: CLINIC | Age: 49
End: 2024-06-21
Payer: COMMERCIAL

## 2024-06-21 VITALS
OXYGEN SATURATION: 100 % | HEIGHT: 66 IN | HEART RATE: 77 BPM | SYSTOLIC BLOOD PRESSURE: 106 MMHG | DIASTOLIC BLOOD PRESSURE: 72 MMHG

## 2024-06-21 VITALS — BODY MASS INDEX: 27.28 KG/M2 | WEIGHT: 169 LBS

## 2024-06-21 DIAGNOSIS — Z95.1 PRESENCE OF AORTOCORONARY BYPASS GRAFT: ICD-10-CM

## 2024-06-21 DIAGNOSIS — E78.00 PURE HYPERCHOLESTEROLEMIA, UNSPECIFIED: ICD-10-CM

## 2024-06-21 PROCEDURE — 99214 OFFICE O/P EST MOD 30 MIN: CPT | Mod: 25

## 2024-06-21 PROCEDURE — 93000 ELECTROCARDIOGRAM COMPLETE: CPT

## 2024-06-21 RX ORDER — LOSARTAN POTASSIUM 25 MG/1
25 TABLET, FILM COATED ORAL
Qty: 90 | Refills: 1 | Status: ACTIVE | COMMUNITY
Start: 2023-09-15 | End: 1900-01-01

## 2024-06-21 NOTE — DISCUSSION/SUMMARY
[Coronary Artery Disease] : coronary artery disease [Hyperlipidemia] : hyperlipidemia [None] : There are no changes in medication management [Essential Hypertension] : essential hypertension [Stable] : stable [___ Month(s)] : in [unfilled] month(s) [EKG obtained to assist in diagnosis and management of assessed problem(s)] : EKG obtained to assist in diagnosis and management of assessed problem(s) [FreeTextEntry1] : 9/1/2023  s/p robotic CABG by Dinah Luz.  no chest pain.  BP, lipds are well controlled.  needs better diabetic control.  maintain current meds. f/u 3 months  12/1/2023  spoke with CT surgery.  Pt may stop plavix 5 days in advance of mastectomy.  6/21/2024  normal and unchanged echo. BP well controlled. no CV sx.  recheck lipids with next blood panel.

## 2024-06-21 NOTE — HISTORY OF PRESENT ILLNESS
[FreeTextEntry1] :  Pt is 48 year old female  with 15 year hx of DM and recent dx htn and elevated cholesterol.  came to [place] last year.  she presented to hosp with chest pain and found to have  of LAD and underwent robotic CABG with Dr. Nix.  no chest pain, but little sticking pain. has drainage and swelling from surgical site and being followed by Dr. Nix.   never smoked rare social alcohol hx of c section nkda father had  CABG two years ago   5/22/2023  no new complaints, blood work reviewed with pt.  will be starting cardiac rehab.  only issue is mild pedal edema.  9/1/2023  no new complaints.  never started cardiac rehab.  she is walking at home.  labs reviewed cholesterol is at goal  12/22/2023  completed breast surgery no cardiac issues.. will prob need course of rad trx. probably no chemotherapy but pt will know for sure after her follow up.  3/22/2024  completed RT. no cp, sob or edema.  sugar is well controlled.  chemo plan noted.,  6/21/2024  started lupron inkections and having joint pain.  told pt to discuss with oncology.  no cp, sob or edema.

## 2024-06-28 NOTE — HISTORY OF PRESENT ILLNESS
[de-identified] : RAGHAV LAINEZ is a 49-year F w/ Right breast cT1 cN0 IDC/DCIS +/+/- dx'ed in 10/2023 s/p R PM, R SLNBx (0) on 23. [Path: IDC 1.1 cm, clear margins]. s/p XRT. Here for 3-month follow-up.   23 She denies any palpable breast masses, nipple discharge or skin changes.  23 POD #15.  Pain well controlled.  She reports increased bruising and swelling in the right breast immediately postop and has remained stable.  She continues on Plavix.  No fevers or chills.  s/p right breast needle aspiration w/ 60 ccs of fluid aspirated.  2024 Patient reports no fevers or chills. Reports no pain over right breast. Right arm with full ROM. Surgical incision is well healed.  24 Met w/ Dr. Arturo Jarvis, oncotype DX 15. No plan for chemo, Rec AI x 5 years and Lupron x 3mo. 24 Met w/ Dr. Jerome. Plan for RT to right breast.  (4,240 cGy in 16 fractions of 265cGy/fx).  24-24 completed RT to Right breast.  24 Following w/ Dr. Jerome. Noted residual widespread hyperpigmentation noted in treated area. Continues use of vanicream. 24 Has some diffuse hyperpigmentation after right breast, doing well after XRT. Has not started tamoxifen yet as is considering having another child and has some reservations regarding medication.   Referred by: Dr. Antonietta Mcadams (PCP)  Cardiologist: Dr. Bryant  PMHx: T2DM (on insulin), CAD s/p CABG w/ Dr. Nix, HLD, and HTN PSHx:  , robotic CABG 3/2023 Meds: ASA 81, Lipitor 80, Losartan 25 mg QD, Trulicity, lantus 20 qhs, lispro 5 u qAC. Metformin 500 qAM, 1,000 qPM, Multivitamin.  NKDA Family Hx: Anal cancer (Father dx in his 70s) GYN: , menarche age 13, LMP 10/17/23. Age at first pregnancy 26.  Y (2 months) Bra size: 38 B Occupation:    Social: Smoking: Denies        ETOH: Socially

## 2024-06-28 NOTE — RESULTS/DATA
[FreeTextEntry1] : BREAST PATH/RAD REVIEW  [facility]: 8/23/2018 BL SC MG: BIRADs 1. Heterogeneously dense.  9/30/2023 BL SC MG: BIRADs. 0. Scattered areas of fibroglandular density. BL scattered diffuse nodularity; BL scattered calcs. Interval development of a R posterior UOQ 7 mm spiculated mass (Rec R Dx US). Stable R axillary low-lying LN.  10/13/2023 R Dx US: BIRADs 5. R 11N8 0.7 cm hypoechoic mass w/ angular margins. Taller than wide (Rec USGBx). No suspicious R axillary LAD. R 9N5 and L 10N3 cysts.   10/27/2023 Right US guided bx: 1. Right 11N8 core bx: Invasive moderately differentiated ductal carcinoma. Intermediate grade DCIS, microcalcs present associated w/ invasive carcinoma. LVP not seen. Coil Clip. Concordant. ER 90%, WV 90%, HER2 Negative (1+ membrane staining) (Rec Surg c/s)   11/24/2023 Breast MRI: BIRADs 6. - R posterior central outer bx-proven malignancy associated with a 0.8 cm enhancing mass. (continued surg c/s) - No suspicious L breast enhancement.  - No axillary or IM LAD  12/6/2023 s/p R paige loc PM, R SLNBx (0/6) at [facility].  pT1c pN0  Path (Right 11:00 lumpectomy): Moderately differentiated IDC, 1.1 cm. bx site changes. Clear margins

## 2024-06-28 NOTE — PHYSICAL EXAM
[Normal] : supple, no neck mass and thyroid not enlarged [Normal Neck Lymph Nodes] : normal neck lymph nodes  [Normal Axillary Lymph Nodes] : normal axillary lymph nodes [de-identified] : Right breast surgical incision (upper lateral) is well-healed.  right post-radiation hyperpigmentation.  no palpable masses in either breast.  no clinical lymphadenopathy  [de-identified] : Normal respiratory effort

## 2024-06-28 NOTE — ASSESSMENT
[FreeTextEntry1] : RAGHAV LAINEZ is a 49-year F w/ Right breast cT1 cN0 IDC/DCIS +/+/- dx'ed in 10/2023 s/p R PM, R SLNBx (0/6) on 12/6/23. [Path: IDC 1.1 cm, clear margins]. s/p XRT. Here for 3-month follow-up.   11/7/23 [facility] panel: Negative, VUS.  Oncotype Dx: 15.   She has recovered well after surgery and has completed adjuvant RT.   - Medical Oncology: She is following Dr Jarvis. Hormone therapy was delayed due to fertility considerations. Next follow up in 5/2024. - Radiation Oncology: Following w/ Dr. Jerome completed RT to R breast on 2/27/24.  - Next imaging:  BL Dx MG/US due 10/2024.  - RTO in 6 months. She knows to return sooner if any breast imaging abnormalities or if any breast issues/concerns arise.

## 2024-07-04 NOTE — ASU PREOP CHECKLIST - AS TEMP SITE
pt notification. pt with ALS and having a leak to the trach. size 8.0 shiley xct. pt brought directly to [facility]
oral

## 2024-07-16 ENCOUNTER — APPOINTMENT (OUTPATIENT)
Dept: HEMATOLOGY ONCOLOGY | Facility: CLINIC | Age: 49
End: 2024-07-16

## 2024-07-16 ENCOUNTER — APPOINTMENT (OUTPATIENT)
Dept: INFUSION THERAPY | Facility: HOSPITAL | Age: 49
End: 2024-07-16

## 2024-07-18 ENCOUNTER — OUTPATIENT (OUTPATIENT)
Dept: OUTPATIENT SERVICES | Facility: HOSPITAL | Age: 49
LOS: 1 days | Discharge: HOME | End: 2024-07-18

## 2024-07-18 DIAGNOSIS — Z98.891 HISTORY OF UTERINE SCAR FROM PREVIOUS SURGERY: Chronic | ICD-10-CM

## 2024-07-18 DIAGNOSIS — Z95.1 PRESENCE OF AORTOCORONARY BYPASS GRAFT: Chronic | ICD-10-CM

## 2024-07-18 DIAGNOSIS — C50.919 MALIGNANT NEOPLASM OF UNSPECIFIED SITE OF UNSPECIFIED FEMALE BREAST: ICD-10-CM

## 2024-07-19 ENCOUNTER — RESULT REVIEW (OUTPATIENT)
Age: 49
End: 2024-07-19

## 2024-07-19 ENCOUNTER — APPOINTMENT (OUTPATIENT)
Dept: HEMATOLOGY ONCOLOGY | Facility: CLINIC | Age: 49
End: 2024-07-19

## 2024-07-19 ENCOUNTER — APPOINTMENT (OUTPATIENT)
Dept: INFUSION THERAPY | Facility: HOSPITAL | Age: 49
End: 2024-07-19

## 2024-07-19 ENCOUNTER — RX RENEWAL (OUTPATIENT)
Age: 49
End: 2024-07-19

## 2024-07-19 VITALS
HEART RATE: 76 BPM | SYSTOLIC BLOOD PRESSURE: 96 MMHG | DIASTOLIC BLOOD PRESSURE: 64 MMHG | BODY MASS INDEX: 27.93 KG/M2 | OXYGEN SATURATION: 97 % | TEMPERATURE: 97.2 F | RESPIRATION RATE: 16 BRPM | WEIGHT: 173.06 LBS

## 2024-07-19 DIAGNOSIS — C50.919 MALIGNANT NEOPLASM OF UNSPECIFIED SITE OF UNSPECIFIED FEMALE BREAST: ICD-10-CM

## 2024-07-19 LAB
BASOPHILS # BLD AUTO: 0.04 K/UL — SIGNIFICANT CHANGE UP (ref 0–0.2)
BASOPHILS NFR BLD AUTO: 0.6 % — SIGNIFICANT CHANGE UP (ref 0–2)
EOSINOPHIL # BLD AUTO: 0.1 K/UL — SIGNIFICANT CHANGE UP (ref 0–0.5)
EOSINOPHIL NFR BLD AUTO: 1.6 % — SIGNIFICANT CHANGE UP (ref 0–6)
HCT VFR BLD CALC: 34.9 % — SIGNIFICANT CHANGE UP (ref 34.5–45)
HGB BLD-MCNC: 11 G/DL — LOW (ref 11.5–15.5)
IMM GRANULOCYTES NFR BLD AUTO: 0.3 % — SIGNIFICANT CHANGE UP (ref 0–0.9)
LYMPHOCYTES # BLD AUTO: 2.27 K/UL — SIGNIFICANT CHANGE UP (ref 1–3.3)
LYMPHOCYTES # BLD AUTO: 35.2 % — SIGNIFICANT CHANGE UP (ref 13–44)
MCHC RBC-ENTMCNC: 27.8 PG — SIGNIFICANT CHANGE UP (ref 27–34)
MCHC RBC-ENTMCNC: 31.5 G/DL — LOW (ref 32–36)
MCV RBC AUTO: 88.1 FL — SIGNIFICANT CHANGE UP (ref 80–100)
MONOCYTES # BLD AUTO: 0.49 K/UL — SIGNIFICANT CHANGE UP (ref 0–0.9)
MONOCYTES NFR BLD AUTO: 7.6 % — SIGNIFICANT CHANGE UP (ref 2–14)
NEUTROPHILS # BLD AUTO: 3.53 K/UL — SIGNIFICANT CHANGE UP (ref 1.8–7.4)
NEUTROPHILS NFR BLD AUTO: 54.7 % — SIGNIFICANT CHANGE UP (ref 43–77)
NRBC # BLD: 0 /100 WBCS — SIGNIFICANT CHANGE UP (ref 0–0)
PLATELET # BLD AUTO: 211 K/UL — SIGNIFICANT CHANGE UP (ref 150–400)
RBC # BLD: 3.96 M/UL — SIGNIFICANT CHANGE UP (ref 3.8–5.2)
RBC # FLD: 13.5 % — SIGNIFICANT CHANGE UP (ref 10.3–14.5)
WBC # BLD: 6.45 K/UL — SIGNIFICANT CHANGE UP (ref 3.8–10.5)
WBC # FLD AUTO: 6.45 K/UL — SIGNIFICANT CHANGE UP (ref 3.8–10.5)

## 2024-07-19 PROCEDURE — 99214 OFFICE O/P EST MOD 30 MIN: CPT

## 2024-07-19 PROCEDURE — G2211 COMPLEX E/M VISIT ADD ON: CPT

## 2024-07-19 RX ORDER — ANASTROZOLE TABLETS 1 MG/1
1 TABLET ORAL
Qty: 30 | Refills: 4 | Status: ACTIVE | COMMUNITY
Start: 2024-07-19 | End: 1900-01-01

## 2024-07-19 RX ORDER — LANCETS 28 GAUGE
EACH MISCELLANEOUS
Qty: 100 | Refills: 2 | Status: ACTIVE | COMMUNITY
Start: 2024-07-19 | End: 1900-01-01

## 2024-07-20 LAB
ALBUMIN SERPL ELPH-MCNC: 4.7 G/DL
ALP BLD-CCNC: 94 U/L
ALT SERPL-CCNC: 35 U/L
ANION GAP SERPL CALC-SCNC: 14 MMOL/L
AST SERPL-CCNC: 25 U/L
BILIRUB SERPL-MCNC: 0.6 MG/DL
BUN SERPL-MCNC: 17 MG/DL
CALCIUM SERPL-MCNC: 9.9 MG/DL
CHLORIDE SERPL-SCNC: 102 MMOL/L
CO2 SERPL-SCNC: 24 MMOL/L
CREAT SERPL-MCNC: 0.62 MG/DL
EGFR: 110 ML/MIN/1.73M2
FSH SERPL-MCNC: 5.1 IU/L
GLUCOSE SERPL-MCNC: 149 MG/DL
LH SERPL-ACNC: <0.3 IU/L
POTASSIUM SERPL-SCNC: 4.6 MMOL/L
PROT SERPL-MCNC: 7.5 G/DL
SODIUM SERPL-SCNC: 140 MMOL/L

## 2024-07-24 ENCOUNTER — APPOINTMENT (OUTPATIENT)
Dept: GASTROENTEROLOGY | Facility: CLINIC | Age: 49
End: 2024-07-24

## 2024-07-28 PROBLEM — M25.50 DIFFUSE ARTHRALGIA: Status: ACTIVE | Noted: 2024-07-28

## 2024-08-01 ENCOUNTER — OUTPATIENT (OUTPATIENT)
Dept: OUTPATIENT SERVICES | Facility: HOSPITAL | Age: 49
LOS: 1 days | End: 2024-08-01
Payer: SELF-PAY

## 2024-08-01 ENCOUNTER — APPOINTMENT (OUTPATIENT)
Dept: ENDOCRINOLOGY | Facility: HOSPITAL | Age: 49
End: 2024-08-01
Payer: MEDICAID

## 2024-08-01 VITALS
RESPIRATION RATE: 14 BRPM | OXYGEN SATURATION: 100 % | BODY MASS INDEX: 27.32 KG/M2 | SYSTOLIC BLOOD PRESSURE: 119 MMHG | HEIGHT: 66 IN | HEART RATE: 86 BPM | DIASTOLIC BLOOD PRESSURE: 80 MMHG | WEIGHT: 170 LBS | TEMPERATURE: 98 F

## 2024-08-01 DIAGNOSIS — E06.9 THYROIDITIS, UNSPECIFIED: ICD-10-CM

## 2024-08-01 DIAGNOSIS — E11.9 TYPE 2 DIABETES MELLITUS W/OUT COMPLICATIONS: ICD-10-CM

## 2024-08-01 DIAGNOSIS — C50.919 MALIGNANT NEOPLASM OF UNSPECIFIED SITE OF UNSPECIFIED FEMALE BREAST: ICD-10-CM

## 2024-08-01 DIAGNOSIS — E78.00 PURE HYPERCHOLESTEROLEMIA, UNSPECIFIED: ICD-10-CM

## 2024-08-01 DIAGNOSIS — Z95.1 PRESENCE OF AORTOCORONARY BYPASS GRAFT: Chronic | ICD-10-CM

## 2024-08-01 LAB
ALBUMIN SERPL ELPH-MCNC: 5 G/DL — SIGNIFICANT CHANGE UP (ref 3.3–5)
ALBUMIN, RANDOM URINE: <1.2 MG/DL — SIGNIFICANT CHANGE UP
ALBUMIN/CREATININE RATIO (ACR): SIGNIFICANT CHANGE UP MG/G (ref 0–30)
ALP SERPL-CCNC: 102 U/L — SIGNIFICANT CHANGE UP (ref 40–120)
ALT FLD-CCNC: 23 U/L — SIGNIFICANT CHANGE UP (ref 10–45)
ANION GAP SERPL CALC-SCNC: 13 MMOL/L — SIGNIFICANT CHANGE UP (ref 5–17)
AST SERPL-CCNC: 18 U/L — SIGNIFICANT CHANGE UP (ref 10–40)
BILIRUB SERPL-MCNC: 0.6 MG/DL — SIGNIFICANT CHANGE UP (ref 0.2–1.2)
BUN SERPL-MCNC: 17 MG/DL — SIGNIFICANT CHANGE UP (ref 7–23)
CALCIUM SERPL-MCNC: 10.2 MG/DL — SIGNIFICANT CHANGE UP (ref 8.4–10.5)
CHLORIDE SERPL-SCNC: 103 MMOL/L — SIGNIFICANT CHANGE UP (ref 96–108)
CHOLEST SERPL-MCNC: 152 MG/DL — SIGNIFICANT CHANGE UP
CO2 SERPL-SCNC: 25 MMOL/L — SIGNIFICANT CHANGE UP (ref 22–31)
CREAT ?TM UR-MCNC: 154 MG/DL — SIGNIFICANT CHANGE UP
CREAT SERPL-MCNC: 0.61 MG/DL — SIGNIFICANT CHANGE UP (ref 0.5–1.3)
EGFR: 111 ML/MIN/1.73M2 — SIGNIFICANT CHANGE UP
GLUCOSE SERPL-MCNC: 130 MG/DL — HIGH (ref 70–99)
HDLC SERPL-MCNC: 60 MG/DL — SIGNIFICANT CHANGE UP
LIPID PNL WITH DIRECT LDL SERPL: 72 MG/DL — SIGNIFICANT CHANGE UP
NON HDL CHOLESTEROL: 91 MG/DL — SIGNIFICANT CHANGE UP
POTASSIUM SERPL-MCNC: 4.4 MMOL/L — SIGNIFICANT CHANGE UP (ref 3.5–5.3)
POTASSIUM SERPL-SCNC: 4.4 MMOL/L — SIGNIFICANT CHANGE UP (ref 3.5–5.3)
PROT SERPL-MCNC: 7.9 G/DL — SIGNIFICANT CHANGE UP (ref 6–8.3)
SODIUM SERPL-SCNC: 140 MMOL/L — SIGNIFICANT CHANGE UP (ref 135–145)
TRIGL SERPL-MCNC: 109 MG/DL — SIGNIFICANT CHANGE UP

## 2024-08-01 PROCEDURE — ZZZZZ: CPT | Mod: GC

## 2024-08-01 PROCEDURE — 80053 COMPREHEN METABOLIC PANEL: CPT

## 2024-08-01 PROCEDURE — 83036 HEMOGLOBIN GLYCOSYLATED A1C: CPT

## 2024-08-01 PROCEDURE — G0463: CPT

## 2024-08-01 PROCEDURE — 80061 LIPID PANEL: CPT

## 2024-08-01 PROCEDURE — 82043 UR ALBUMIN QUANTITATIVE: CPT

## 2024-08-01 NOTE — PHYSICAL EXAM
[Alert] : alert [No Acute Distress] : no acute distress [Normal Sclera/Conjunctiva] : normal sclera/conjunctiva [EOMI] : extra ocular movement intact [Normal Outer Ear/Nose] : the ears and nose were normal in appearance [Normal Hearing] : hearing was normal [No Respiratory Distress] : no respiratory distress [Clear to Auscultation] : lungs were clear to auscultation bilaterally [Normal S1, S2] : normal S1 and S2 [Regular Rhythm] : with a regular rhythm [Normal Bowel Sounds] : normal bowel sounds [Soft] : abdomen soft [Normal Gait] : normal gait [Normal Strength/Tone] : muscle strength and tone were normal [No Rash] : no rash [No Motor Deficits] : the motor exam was normal [No Tremors] : no tremors [Oriented x3] : oriented to person, place, and time [Normal Affect] : the affect was normal [Normal Insight/Judgement] : insight and judgment were intact [Normal Mood] : the mood was normal [de-identified] : Nonpitting edema in BLE

## 2024-08-01 NOTE — REASON FOR VISIT
[Initial Evaluation] : an initial evaluation [DM Type 2] : DM Type 2 [Follow - Up] : a follow-up visit

## 2024-08-01 NOTE — END OF VISIT
[] : Fellow [FreeTextEntry3] : 50 yo F with DM2, CAD, breast cancer. Continue metformin, trulicity at current dosing with diet/exercise changes per pt preference. Get baseline DXA

## 2024-08-01 NOTE — HISTORY OF PRESENT ILLNESS
[Continuous Glucose Monitoring] : Continuous Glucose Monitoring: Yes [Shantell] : Shantell [FreeTextEntry1] : 49F w/ T2DM, CAD s/p CABG, breast cancer s/p resection, radiation and anti-hormonal therapy who presents for follow up of T2DM  #DM Type 2 Diagnosis: Around 2006 After hospitalization 3/2023 for CABG, was discharged with basal bolus insulin. Off basal bolus since 10/2023.  Current regimen: Trulicity 3mg weekly (started trulicity in 10/2023), metformin 1000mg BID   Last A1c: A1c 8.0 8/2023 -> 7.4 11/2023 --> 6.9 (3/2024) --> today POC A1c is 7.1 SMBG: CGM Freestyle beena 2. Improved sensor use but there remains periods of time daily that she is not checking frequently  TIR () 94%, above range 6%. No low glucose events in the last 30 days  Some days hyperglycemia to low 200s after lunch  Reports sometimes has dizziness but not checking with CGM or FS when has symptoms.  Tolerating trulicity - no n/v/d. Lost about 9lb since starting trulicity.    Complications: CAD: Yes s/p CABG CVA: no Nephropathy: no Retinopathy: Saw ophthalmology 5/2024, no retinopathy Neuropathy: sometimes has numbness/tingling in feet No hx of pancreatitis, thyroid cancer, UTI's  eGFR: 113 (8/2023) alb/cr: negative (8/2023) LDL 49 (8/2023), on atorvastatin 80mg qhs  Foot exam: has not seen podiatrist Issues: numbness/tingling occasionally   Eye exam: Last visit in 5/2024, no retinopathy  SHx: Alcohol: on occasion Smoking: no Work: not working currently FHx: Mother - DM   Other Meds: Atorvastatin 80mg qhs Losartan 25mg daily ASA Plavix  #Risk of osteoporosis Patient was started on leuprolide monthly for breast cancer. Was prescribed aromatase inhibitor but was told not to yet start. no falls or fractures  no steroids no family hx of osteoporosis [FreeTextEntry2] : 94 [FreeTextEntry3] : 6

## 2024-08-01 NOTE — END OF VISIT
[] : Fellow [FreeTextEntry3] : 48 yo F with DM2, CAD, breast cancer. Continue metformin, trulicity at current dosing with diet/exercise changes per pt preference. Get baseline DXA

## 2024-08-01 NOTE — PHYSICAL EXAM
[Alert] : alert [No Acute Distress] : no acute distress [Normal Sclera/Conjunctiva] : normal sclera/conjunctiva [EOMI] : extra ocular movement intact [Normal Outer Ear/Nose] : the ears and nose were normal in appearance [Normal Hearing] : hearing was normal [No Respiratory Distress] : no respiratory distress [Clear to Auscultation] : lungs were clear to auscultation bilaterally [Normal S1, S2] : normal S1 and S2 [Regular Rhythm] : with a regular rhythm [Normal Bowel Sounds] : normal bowel sounds [Soft] : abdomen soft [Normal Gait] : normal gait [Normal Strength/Tone] : muscle strength and tone were normal [No Rash] : no rash [No Motor Deficits] : the motor exam was normal [No Tremors] : no tremors [Oriented x3] : oriented to person, place, and time [Normal Affect] : the affect was normal [Normal Insight/Judgement] : insight and judgment were intact [Normal Mood] : the mood was normal [de-identified] : Nonpitting edema in BLE

## 2024-08-01 NOTE — ASSESSMENT
[FreeTextEntry1] : 49F w/ T2DM, CAD s/p CABG, breast cancer s/p resection who presents for initial evaluation of DM.  #T2DM #HLD Diagnosis: Around 2006 Today POC a1c is 7.1% near goal C/b CAD s/p CABG Current regimen: Trulicity 3mg weekly, metformin 1000mg BID  SMBG: CGM Freestyle beena 2. 94% in range. Some postprandial hyperglycemia - Discussed option of increasing trulicity to 4.5mg weekly. Patient prefers diet control rather than increasing dose. - - c/w metformin 1g BID - eGFR: 113 (8/2023), urine alb/cr negative 8/2023 - LDL 49 8/2023 on atorva 80mg QHS - Last optho 5/2024, no retinopathy - Needs f/u with podiatry - continue FL2 CGM  - Check CMP lipid and urine ACR today  #Risk of osteoporosis started leuprolide for breast cancer may start anastrozole soon - Check baseline DEXA and every 2 years  RTC in 4 months   Discussed with Dr. Ambrocio Mills MD Endocrine fellow

## 2024-08-02 DIAGNOSIS — E11.9 TYPE 2 DIABETES MELLITUS WITHOUT COMPLICATIONS: ICD-10-CM

## 2024-08-02 DIAGNOSIS — C50.919 MALIGNANT NEOPLASM OF UNSPECIFIED SITE OF UNSPECIFIED FEMALE BREAST: ICD-10-CM

## 2024-08-02 DIAGNOSIS — Z00.00 ENCOUNTER FOR GENERAL ADULT MEDICAL EXAMINATION WITHOUT ABNORMAL FINDINGS: ICD-10-CM

## 2024-08-02 DIAGNOSIS — E78.00 PURE HYPERCHOLESTEROLEMIA, UNSPECIFIED: ICD-10-CM

## 2024-08-02 LAB — HBA1C MFR BLD HPLC: 7.1

## 2024-08-09 ENCOUNTER — APPOINTMENT (OUTPATIENT)
Dept: PHYSICAL MEDICINE AND REHAB | Facility: CLINIC | Age: 49
End: 2024-08-09

## 2024-08-09 ENCOUNTER — NON-APPOINTMENT (OUTPATIENT)
Age: 49
End: 2024-08-09

## 2024-08-09 RX ORDER — EZETIMIBE 10 MG/1
10 TABLET ORAL
Qty: 90 | Refills: 3 | Status: ACTIVE | COMMUNITY
Start: 2024-08-09 | End: 1900-01-01

## 2024-08-12 ENCOUNTER — APPOINTMENT (OUTPATIENT)
Dept: INTERNAL MEDICINE | Facility: CLINIC | Age: 49
End: 2024-08-12

## 2024-08-12 VITALS
SYSTOLIC BLOOD PRESSURE: 110 MMHG | BODY MASS INDEX: 27.97 KG/M2 | HEIGHT: 66 IN | DIASTOLIC BLOOD PRESSURE: 70 MMHG | HEART RATE: 89 BPM | WEIGHT: 174 LBS | OXYGEN SATURATION: 98 %

## 2024-08-12 DIAGNOSIS — Z87.42 PERSONAL HISTORY OF OTHER DISEASES OF THE FEMALE GENITAL TRACT: ICD-10-CM

## 2024-08-12 DIAGNOSIS — Z01.419 ENCOUNTER FOR GYNECOLOGICAL EXAMINATION (GENERAL) (ROUTINE) W/OUT ABNORMAL FINDINGS: ICD-10-CM

## 2024-08-12 DIAGNOSIS — Z95.1 PRESENCE OF AORTOCORONARY BYPASS GRAFT: ICD-10-CM

## 2024-08-12 DIAGNOSIS — C50.919 MALIGNANT NEOPLASM OF UNSPECIFIED SITE OF UNSPECIFIED FEMALE BREAST: ICD-10-CM

## 2024-08-12 DIAGNOSIS — Z87.2 PERSONAL HISTORY OF DISEASES OF THE SKIN AND SUBCUTANEOUS TISSUE: ICD-10-CM

## 2024-08-12 DIAGNOSIS — Z00.00 ENCOUNTER FOR GENERAL ADULT MEDICAL EXAMINATION W/OUT ABNORMAL FINDINGS: ICD-10-CM

## 2024-08-12 DIAGNOSIS — Z11.3 ENCOUNTER FOR SCREENING FOR INFECTIONS WITH A PREDOMINANTLY SEXUAL MODE OF TRANSMISSION: ICD-10-CM

## 2024-08-12 DIAGNOSIS — N95.1 MENOPAUSAL AND FEMALE CLIMACTERIC STATES: ICD-10-CM

## 2024-08-12 DIAGNOSIS — E11.9 TYPE 2 DIABETES MELLITUS W/OUT COMPLICATIONS: ICD-10-CM

## 2024-08-12 DIAGNOSIS — M25.50 PAIN IN UNSPECIFIED JOINT: ICD-10-CM

## 2024-08-12 DIAGNOSIS — R10.2 PELVIC AND PERINEAL PAIN: ICD-10-CM

## 2024-08-12 DIAGNOSIS — D64.9 ANEMIA, UNSPECIFIED: ICD-10-CM

## 2024-08-12 DIAGNOSIS — Z86.39 PERSONAL HISTORY OF OTHER ENDOCRINE, NUTRITIONAL AND METABOLIC DISEASE: ICD-10-CM

## 2024-08-12 DIAGNOSIS — E78.00 PURE HYPERCHOLESTEROLEMIA, UNSPECIFIED: ICD-10-CM

## 2024-08-12 PROCEDURE — 99396 PREV VISIT EST AGE 40-64: CPT

## 2024-08-12 NOTE — HISTORY OF PRESENT ILLNESS
[FreeTextEntry1] : CPE  [de-identified] : RAGHAV LAINEZ is a 49 year old female with breast cancer s/p R mastectomy and now on estrogen supression, cad s/p cabg, dm2, hld, menopause due to estrogen supression. anemia.

## 2024-08-12 NOTE — HEALTH RISK ASSESSMENT
[No] : In the past 12 months have you used drugs other than those required for medical reasons? No [No falls in past year] : Patient reported no falls in the past year [Nearly Every Day (3)] : 1.) Little interest or pleasure in doing things? Nearly every day [Several Days (1)] : 3.) Trouble falling asleep, or sleeping too much? Several days [Not at All (0)] : 9.) Thoughts that you would be off dead or of hurting yourself in some way? Not at all [Not at all] : How difficult have these problems made it for you to do your work, take care of things at home, or get along with people? Not at all [Never] : Never [NO] : No [Fully functional (bathing, dressing, toileting, transferring, walking, feeding)] : Fully functional (bathing, dressing, toileting, transferring, walking, feeding) [Fully functional (using the telephone, shopping, preparing meals, housekeeping, doing laundry, using] : Fully functional and needs no help or supervision to perform IADLs (using the telephone, shopping, preparing meals, housekeeping, doing laundry, using transportation, managing medications and managing finances) [[id number]] : 4

## 2024-08-12 NOTE — PHYSICAL EXAM
[de-identified] : no acute distress, well nourished and well developed. Eyes:  normal sclera/conjunctiva, pupils equal round and reactive to light and extraocular movements intact. ENT:  the outer ears and nose were normal in appearance. Neck:  no jugular venous distention, no lymphadenopathy, supple and the thyroid was normal and there were no nodules present. Pulmonary:  no respiratory distress, no accessory muscle use, lungs were clear to auscultation bilaterally. Cardiac:  normal rate, with a regular rhythm, normal S1 and S2 and no murmur heard. Vascular:  no carotid bruits, no varicosities, the pedal pulses are present, there was no peripheral edema and no extremity clubbing/cyanosis. Abdomen:  abdomen soft, non-tender, non-distended and normal bowel sounds. Lymphatic:  no posterior cervical lymphadenopathy, no anterior cervical lymphadenopathy. Back:  no CVA tenderness and no spinal tenderness. Musculoskeletal:  no joint swelling and grossly normal strength/tone. Skin:  no rash and no skin lesions. Neurology:  coordination grossly intact and no focal deficits. Psychiatric:  the affect was normal and insight and judgment were intact.

## 2024-08-12 NOTE — REVIEW OF SYSTEMS
[Fatigue] : fatigue [Hot Flashes] : hot flashes [Night Sweats] : night sweats [Joint Pain] : joint pain [Anxiety] : anxiety [Fever] : no fever [Chills] : no chills [Recent Change In Weight] : ~T no recent weight change [Discharge] : no discharge [Pain] : no pain [Redness] : no redness [Earache] : no earache [Hearing Loss] : no hearing loss [Nosebleed] : no nosebleeds [Chest Pain] : no chest pain [Palpitations] : no palpitations [Leg Claudication] : no leg claudication [Shortness Of Breath] : no shortness of breath [Wheezing] : no wheezing [Abdominal Pain] : no abdominal pain [Nausea] : no nausea [Constipation] : no constipation [Vomiting] : no vomiting [Dysuria] : no dysuria [Incontinence] : no incontinence [Nocturia] : no nocturia [Hematuria] : no hematuria [Frequency] : no frequency [Vaginal Discharge] : no vaginal discharge [Joint Stiffness] : no joint stiffness [Joint Swelling] : no joint swelling [Muscle Pain] : no muscle pain [Back Pain] : no back pain [Itching] : no itching [Mole Changes] : no mole changes [Nail Changes] : no nail changes [Skin Rash] : no skin rash [Headache] : no headache [Dizziness] : no dizziness [Fainting] : no fainting [Memory Loss] : no memory loss [Suicidal] : not suicidal [Insomnia] : no insomnia [Depression] : no depression [Easy Bleeding] : no easy bleeding [Easy Bruising] : no easy bruising [Swollen Glands] : no swollen glands

## 2024-08-16 ENCOUNTER — APPOINTMENT (OUTPATIENT)
Dept: INFUSION THERAPY | Facility: HOSPITAL | Age: 49
End: 2024-08-16

## 2024-08-19 ENCOUNTER — NON-APPOINTMENT (OUTPATIENT)
Age: 49
End: 2024-08-19

## 2024-09-12 ENCOUNTER — OUTPATIENT (OUTPATIENT)
Dept: OUTPATIENT SERVICES | Facility: HOSPITAL | Age: 49
LOS: 1 days | Discharge: HOME | End: 2024-09-12

## 2024-09-12 DIAGNOSIS — C50.919 MALIGNANT NEOPLASM OF UNSPECIFIED SITE OF UNSPECIFIED FEMALE BREAST: ICD-10-CM

## 2024-09-12 DIAGNOSIS — Z98.891 HISTORY OF UTERINE SCAR FROM PREVIOUS SURGERY: Chronic | ICD-10-CM

## 2024-09-17 ENCOUNTER — APPOINTMENT (OUTPATIENT)
Dept: INFUSION THERAPY | Facility: HOSPITAL | Age: 49
End: 2024-09-17

## 2024-09-20 ENCOUNTER — APPOINTMENT (OUTPATIENT)
Dept: CARDIOLOGY | Facility: CLINIC | Age: 49
End: 2024-09-20
Payer: COMMERCIAL

## 2024-09-20 ENCOUNTER — NON-APPOINTMENT (OUTPATIENT)
Age: 49
End: 2024-09-20

## 2024-09-20 VITALS
SYSTOLIC BLOOD PRESSURE: 123 MMHG | HEIGHT: 66 IN | BODY MASS INDEX: 27.97 KG/M2 | OXYGEN SATURATION: 100 % | DIASTOLIC BLOOD PRESSURE: 80 MMHG | WEIGHT: 174 LBS | HEART RATE: 78 BPM

## 2024-09-20 VITALS
HEIGHT: 66 IN | SYSTOLIC BLOOD PRESSURE: 123 MMHG | DIASTOLIC BLOOD PRESSURE: 80 MMHG | WEIGHT: 174 LBS | BODY MASS INDEX: 27.97 KG/M2 | HEART RATE: 78 BPM | OXYGEN SATURATION: 100 %

## 2024-09-20 DIAGNOSIS — Z95.1 PRESENCE OF AORTOCORONARY BYPASS GRAFT: ICD-10-CM

## 2024-09-20 DIAGNOSIS — E78.00 PURE HYPERCHOLESTEROLEMIA, UNSPECIFIED: ICD-10-CM

## 2024-09-20 PROCEDURE — 93000 ELECTROCARDIOGRAM COMPLETE: CPT

## 2024-09-20 PROCEDURE — 99214 OFFICE O/P EST MOD 30 MIN: CPT

## 2024-09-20 NOTE — HISTORY OF PRESENT ILLNESS
[FreeTextEntry1] :  Pt is 48 year old female  with 15 year hx of DM and recent dx htn and elevated cholesterol.  came to [place] last year.  she presented to hosp with chest pain and found to have  of LAD and underwent robotic CABG with Dr. Nix.  no chest pain, but little sticking pain. has drainage and swelling from surgical site and being followed by Dr. Nix.   never smoked rare social alcohol hx of c section nkda father had  CABG two years ago   5/22/2023  no new complaints, blood work reviewed with pt.  will be starting cardiac rehab.  only issue is mild pedal edema.  9/1/2023  no new complaints.  never started cardiac rehab.  she is walking at home.  labs reviewed cholesterol is at goal  12/22/2023  completed breast surgery no cardiac issues.. will prob need course of rad trx. probably no chemotherapy but pt will know for sure after her follow up.  3/22/2024  completed RT. no cp, sob or edema.  sugar is well controlled.  chemo plan noted.,  6/21/2024  started lupron inkections and having joint pain.  told pt to discuss with oncology.  no cp, sob or edema.  9/20/2024  no new sx.. labs reviewed.  no angina. normal echo.

## 2024-09-20 NOTE — DISCUSSION/SUMMARY
[Coronary Artery Disease] : coronary artery disease [Stable] : stable [Hyperlipidemia] : hyperlipidemia [Essential Hypertension] : essential hypertension [Responding to Treatment] : responding to treatment [None] : There are no changes in medication management [EKG obtained to assist in diagnosis and management of assessed problem(s)] : EKG obtained to assist in diagnosis and management of assessed problem(s)

## 2024-10-07 ENCOUNTER — APPOINTMENT (OUTPATIENT)
Dept: RADIATION ONCOLOGY | Facility: CLINIC | Age: 49
End: 2024-10-07

## 2024-10-10 ENCOUNTER — APPOINTMENT (OUTPATIENT)
Dept: HEMATOLOGY ONCOLOGY | Facility: CLINIC | Age: 49
End: 2024-10-10

## 2024-10-10 ENCOUNTER — NON-APPOINTMENT (OUTPATIENT)
Age: 49
End: 2024-10-10

## 2024-10-10 ENCOUNTER — APPOINTMENT (OUTPATIENT)
Dept: RADIOLOGY | Facility: IMAGING CENTER | Age: 49
End: 2024-10-10

## 2024-10-10 ENCOUNTER — RESULT REVIEW (OUTPATIENT)
Age: 49
End: 2024-10-10

## 2024-10-10 ENCOUNTER — APPOINTMENT (OUTPATIENT)
Dept: HEMATOLOGY ONCOLOGY | Facility: CLINIC | Age: 49
End: 2024-10-10
Payer: COMMERCIAL

## 2024-10-10 ENCOUNTER — OUTPATIENT (OUTPATIENT)
Dept: OUTPATIENT SERVICES | Facility: HOSPITAL | Age: 49
LOS: 1 days | End: 2024-10-10
Payer: COMMERCIAL

## 2024-10-10 VITALS
WEIGHT: 174.14 LBS | BODY MASS INDEX: 28.11 KG/M2 | OXYGEN SATURATION: 97 % | RESPIRATION RATE: 16 BRPM | DIASTOLIC BLOOD PRESSURE: 67 MMHG | SYSTOLIC BLOOD PRESSURE: 130 MMHG | TEMPERATURE: 97.2 F | HEART RATE: 68 BPM

## 2024-10-10 DIAGNOSIS — Z79.899 OTHER LONG TERM (CURRENT) DRUG THERAPY: ICD-10-CM

## 2024-10-10 DIAGNOSIS — C50.919 MALIGNANT NEOPLASM OF UNSPECIFIED SITE OF UNSPECIFIED FEMALE BREAST: ICD-10-CM

## 2024-10-10 DIAGNOSIS — Z98.891 HISTORY OF UTERINE SCAR FROM PREVIOUS SURGERY: Chronic | ICD-10-CM

## 2024-10-10 LAB
ALBUMIN SERPL ELPH-MCNC: 4.4 G/DL
ALP BLD-CCNC: 74 U/L
ALT SERPL-CCNC: 25 U/L
ANION GAP SERPL CALC-SCNC: 11 MMOL/L
AST SERPL-CCNC: 22 U/L
BASOPHILS # BLD AUTO: 0.05 K/UL — SIGNIFICANT CHANGE UP (ref 0–0.2)
BASOPHILS NFR BLD AUTO: 0.9 % — SIGNIFICANT CHANGE UP (ref 0–2)
BILIRUB SERPL-MCNC: 0.6 MG/DL
BUN SERPL-MCNC: 7 MG/DL
CALCIUM SERPL-MCNC: 9.8 MG/DL
CHLORIDE SERPL-SCNC: 104 MMOL/L
CO2 SERPL-SCNC: 25 MMOL/L
CREAT SERPL-MCNC: 0.57 MG/DL
EGFR: 113 ML/MIN/1.73M2
EOSINOPHIL # BLD AUTO: 0.1 K/UL — SIGNIFICANT CHANGE UP (ref 0–0.5)
EOSINOPHIL NFR BLD AUTO: 1.9 % — SIGNIFICANT CHANGE UP (ref 0–6)
FSH SERPL-MCNC: 7.5 IU/L
GLUCOSE SERPL-MCNC: 130 MG/DL
HCT VFR BLD CALC: 33 % — LOW (ref 34.5–45)
HGB BLD-MCNC: 10.3 G/DL — LOW (ref 11.5–15.5)
IMM GRANULOCYTES NFR BLD AUTO: 0.2 % — SIGNIFICANT CHANGE UP (ref 0–0.9)
LH SERPL-ACNC: <0.3 IU/L
LYMPHOCYTES # BLD AUTO: 2.1 K/UL — SIGNIFICANT CHANGE UP (ref 1–3.3)
LYMPHOCYTES # BLD AUTO: 39.3 % — SIGNIFICANT CHANGE UP (ref 13–44)
MCHC RBC-ENTMCNC: 27.8 PG — SIGNIFICANT CHANGE UP (ref 27–34)
MCHC RBC-ENTMCNC: 31.2 G/DL — LOW (ref 32–36)
MCV RBC AUTO: 89.2 FL — SIGNIFICANT CHANGE UP (ref 80–100)
MONOCYTES # BLD AUTO: 0.52 K/UL — SIGNIFICANT CHANGE UP (ref 0–0.9)
MONOCYTES NFR BLD AUTO: 9.7 % — SIGNIFICANT CHANGE UP (ref 2–14)
NEUTROPHILS # BLD AUTO: 2.56 K/UL — SIGNIFICANT CHANGE UP (ref 1.8–7.4)
NEUTROPHILS NFR BLD AUTO: 48 % — SIGNIFICANT CHANGE UP (ref 43–77)
NRBC # BLD: 0 /100 WBCS — SIGNIFICANT CHANGE UP (ref 0–0)
PLATELET # BLD AUTO: 197 K/UL — SIGNIFICANT CHANGE UP (ref 150–400)
POTASSIUM SERPL-SCNC: 4.7 MMOL/L
PROT SERPL-MCNC: 6.7 G/DL
RBC # BLD: 3.7 M/UL — LOW (ref 3.8–5.2)
RBC # FLD: 13.4 % — SIGNIFICANT CHANGE UP (ref 10.3–14.5)
SODIUM SERPL-SCNC: 140 MMOL/L
WBC # BLD: 5.34 K/UL — SIGNIFICANT CHANGE UP (ref 3.8–10.5)
WBC # FLD AUTO: 5.34 K/UL — SIGNIFICANT CHANGE UP (ref 3.8–10.5)

## 2024-10-10 PROCEDURE — 99214 OFFICE O/P EST MOD 30 MIN: CPT

## 2024-10-10 PROCEDURE — G2211 COMPLEX E/M VISIT ADD ON: CPT

## 2024-10-10 PROCEDURE — 77080 DXA BONE DENSITY AXIAL: CPT

## 2024-10-10 PROCEDURE — 77080 DXA BONE DENSITY AXIAL: CPT | Mod: 26

## 2024-10-11 PROBLEM — Z79.899 HIGH RISK MEDICATION USE: Status: ACTIVE | Noted: 2024-10-11

## 2024-10-17 ENCOUNTER — APPOINTMENT (OUTPATIENT)
Dept: SURGICAL ONCOLOGY | Facility: CLINIC | Age: 49
End: 2024-10-17
Payer: SELF-PAY

## 2024-10-17 ENCOUNTER — APPOINTMENT (OUTPATIENT)
Dept: INFUSION THERAPY | Facility: HOSPITAL | Age: 49
End: 2024-10-17

## 2024-10-17 VITALS
WEIGHT: 174 LBS | BODY MASS INDEX: 27.97 KG/M2 | OXYGEN SATURATION: 98 % | HEIGHT: 66 IN | SYSTOLIC BLOOD PRESSURE: 119 MMHG | DIASTOLIC BLOOD PRESSURE: 78 MMHG | HEART RATE: 76 BPM

## 2024-10-17 DIAGNOSIS — Z91.89 OTHER SPECIFIED PERSONAL RISK FACTORS, NOT ELSEWHERE CLASSIFIED: ICD-10-CM

## 2024-10-17 PROCEDURE — 99213 OFFICE O/P EST LOW 20 MIN: CPT

## 2024-10-21 PROBLEM — Z91.89 AT INCREASED RISK FOR MALIGNANT NEOPLASM OF BREAST: Status: ACTIVE | Noted: 2024-10-21

## 2024-10-30 NOTE — H&P ADULT - NSHPSOCIALHISTORY_GEN_ALL_CORE
[FreeTextEntry1] : 2024 FIRST VISIT ; TAM is a 18 year old female, with mother  Mother reports that they are here today because the last 2 times that she got sick she had migraines, so she was referred here. As per mother, in the past, when TAM was getting sick it used to get to the lungs.  TAM has no history of headaches; however, she had 2 headaches over the last month, the second one is still ongoing.  Mother reports that she presented to PMD not feeling well 24. Symptoms started few days before. She denies history of fever, URI symptoms, or AGE symptoms. She only had a HA. PMD prescribed Augmentin for 10 days for a presumed viral illness as her father was sick at home, and PMD wanted to prevent it from getting into her lungs. She had a bad headache then and she was nauseous. PMD thought that it was a migraine starting. That HA started few days before seeing PMD and it resolved few days after stopping the Augmentin. About 10 days after stopping the antibiotics the headache came back. PMD then prescribed Cefdinir for presumed sinus infection another 10 days. TAM completed that 10 day course of antibiotics but the HA did not resolve since then. TAM is now also taking decongestant and Ibuprofen 500 mg. The last time she took Ibuprofen was yesterday (mother was not aware of that). TAM reports she is taking Ibuprofen 500 mg as needed, but daily for few days. She reports she has a HA now, but not as bad as it was but still there. As per mother, TAM appeared wilt for few days and now she looks better. TAM missed 6-7 school days because of the above and few times she left school early s times. She attempted to go to school on 24 but she left after 1/2 day. Pain was not bad yesterday and day before (over the weekend).  TAM reports that HAs initially were 8-9 / 10; today it is a 3/10 and at night it goes up to 6-7 / 10. Tapering off. HAs are not waking her up from sleep but she wakes up in the morning with HAs. She had nausea at the height of the infection but no vomiting.  She denies change in vision or hearing; she reports noise sensitivity and light sensitivity - now only in school Pain is described as constant pressure; Pain is in the front and sides.  Relief: Ibuprofen helps  Sleep: school days 11 pm - 6 a.m; weekends  midnight -1-2 am - mother wakes her up noon Eating habits: not skipping meals In 11th grade; doing well academically and socially Stress / anxiety / depression: stress but nothing else; anxious sometimes Other MEDS:  Zyrtec, Symbacort for asthma, Flonase, and Albuterol for acute asthma   FHx: none  TAM is scheduled to see ENT on 24; PMD suggested to get head CT  Plan: limit NSAIDs, to use as needed only for severe headaches, not to exceed twice a week to avoid rebound type headaches; for headaches that are 10/10 may consider coming to the ER for IV tx; briefly discussed prophylaxis, but not indicated; Brain MRI as a precaution. ___________________  2024  follow up > Brain MRI 3/15/24 Normal MR of the brain. No sinus disease Above reviewed with mother; she states ENT R/O any sinus issues; as per mother TAM was seen by ophtho not long ago for the glasses.  Mother reports TAM has a bad HA. The HA started 24 and it was very bad Friday to . TAM reports she was seeing spots during these 3 days. She had nausea but no vomiting, pain was 7-9 / 10; as per mother, she was taking Advil 500 mg every 4-6 hours as advised by PMD but it did not help and she slept a lot. Since then, the HA is getting better. Today pain is 6/10. TAM missed school because of the HA Monday, Tuesday, and today. Since last visit, TAM had few headaches, however, during the summer she did not miss school for that, and last school year she only left school early. However, now a new school year started and she missed school this week, this is causing issues with school. It is causing stress. Mother needs a letter for the school nurse that TAM has migraines. Senior in ; taking many AP classes, stressed. She reports that she gets sleep, 7 hours, and regular. Mother thinks that headaches are triggered by change in weather. TAM denies vertigo or focal weakness during the migraines Plan: reviewed HA tx plan; NSAIDs as needed for severe headaches, not to exceed twice a week to avoid rebound type headaches; may try  Excedrin; Migravent OR Magnesium 400 mg and Vit B2 400 mgs for 4-8 weeks; if no reduction in HA frequency or severity, D/C; if responding to tx, then continue; Discussed Triptans; recommend ODT Rizatriptan 5 mg at the onset of migraine; may increase to 10 mg as needed if no relief from 5 mg; printed information provided and side effects reviewed; If pain is severe and not responding to any tx, recommend coming to the ER for IV tx; keep healthy sleep and eating habits; letter for school that TAM is under my care for migraines  HIE Clinical Viewer reviewed > ER note 10/22/24: - Chief Complaint: The patient is a 19y Female complaining of migraine. - HPI Objective Statement: 19-year-old with history of migraines. Has been followed by neurology and is on triptan for resolution. Headache started last week with resolution of headache with 1 dose of triptan. However still had low level headache and then woke up yesterday morning with headache took a triptan and vomited x 1. No resolution or worsening symptoms today currently 8 out of 10. Also having complaints of photo and phonophobia currently sitting in a room with lights and conversing without any distress. CURRENT ORDERS/: - sodium chloride 0.9% IV Intermittent (Bolus) - Peds, 1000 milliLiter(s), IV Bolus, once, infuse over 1 Hr, Stop After 1 Doses - metoclopramide IV Intermittent - , [Ordered as REGLAN IV Intermittent - ] 10 milliGRAM(s) in sodium chloride 0.9% 50 milliLiter(s), IV Intermittent, Once, infuse over 15 Minute(s), Stop After 1 Doses Indication: migraine - ketorolac IV Push - Peds., [Ordered as TORADOL IV Push - Peds.] 25 milliGRAM(s), IV Push, Once, Stop After 1 Doses Principal Discharge DX: Migraine. Medical Decision Making: - The following orders were submitted: Medications - Clinical Summary (MDM): Summarize the clinical encounter well appearing with 8/10 pain, cj start migraine cocktail and re-0assess imporved with toradol awaiting reglan and currently 5/10 pain currently now 3/10  > ER note 10/24/24 - Chief Complaint: The patient is a 19y Female complaining of migraine. - HPI Objective Statement: 20yo F hx of asthma and recently diagnosed with migraines (2024) follows Dr. Tracie Johnson re-presenting with migraines. Pt was seen in our ED 2 days ago (10/22) and received the migraine cocktail with improvement and discharged home. However, after 24 hours the migraine returned. This episode of migraines began 1 wk ago + nausea + photophobia. Pt saw Dr. Johnson on Monday and took a sublingual Riztriptan without improvement. Last took Excedrin at 7:30 PM last night.  CURRENT ORDERS/: - prochlorperazine 10 milliGRAM(s) - ketorolac IV Push 15 milliGRAM(s), IV Push  PROGRESS NOTE: Date: 24-Oct-2024 13:23. Progress: Pt improved after migraine cocktail. Neurology fellow notified. Will discharge home with follow up with Dr. Johnson in 1 week. ___________________  10/28/2024  follow up TEB  Above reviewed with mother and TAM. Mother reports that TAM was seen in the ER last week twice. She was seen there on 10/22/24 and she was treated with IV meds. Pain decreased from 8/10 to 3/10. Relief lasted 24 hours and pain came back. She returned to the ER on 10/24/24, she was treated with IV meds again. Pain responded, from 8/10 to 5/10 this time. Mother reports that TAM missed school all of last week. Today she went to school but she left school at 10 am. The HAs is persisting now for 1 week.  Mother states that her insurance covers only 50% of an ER visit so it is expansive (over $1000).  Mother reports TAM took Rizatriptan 5 mg 2 weekends ago once and she thought then that is was helping, pain relief from 8/10 to 2/10 but she spent a weekend in bed. The second time that she used Rizatriptan was last Monday 10/21/24, however, TAM vomited after 40 minutes.  TAM denies any side effects from Rizatriptan.  TAM is taking Migravent since last visit here on 24; mother does not think that it is helping but she is willing to try it for 6-8 weeks.  TAM missed a lot of school because of the headaches TAM reports that pain now is 6/10 She reports that she sleep OK Mother feels that the computer screen bothers her Mother states PMD would like to get a Head CT to make sure this HA is not sinus related, however, ENT told mother that all is good. Mother questions nasal spray for migraines
Social History:    Marital Status:  ( x  )    (   ) Single    (   )    (  )   Occupation:   Lives with: (  ) alone  (  ) children   ( x ) spouse   (  ) parents  (  ) other    Substance Use (street drugs): (x ) never used  (  ) other:  Tobacco Usage:  (  x ) never smoked   (   ) former smoker   (   ) current smoker  (     ) pack years  (        ) last cigarette date  Alcohol Usage: no    (     ) Advanced Directives: (     ) None    (      ) DNR    (     ) DNI    (     ) Health Care Proxy:

## 2024-11-04 ENCOUNTER — APPOINTMENT (OUTPATIENT)
Dept: MRI IMAGING | Facility: IMAGING CENTER | Age: 49
End: 2024-11-04
Payer: COMMERCIAL

## 2024-11-04 ENCOUNTER — OUTPATIENT (OUTPATIENT)
Dept: OUTPATIENT SERVICES | Facility: HOSPITAL | Age: 49
LOS: 1 days | End: 2024-11-04
Payer: COMMERCIAL

## 2024-11-04 DIAGNOSIS — Z91.89 OTHER SPECIFIED PERSONAL RISK FACTORS, NOT ELSEWHERE CLASSIFIED: ICD-10-CM

## 2024-11-04 DIAGNOSIS — Z95.1 PRESENCE OF AORTOCORONARY BYPASS GRAFT: Chronic | ICD-10-CM

## 2024-11-04 DIAGNOSIS — Z98.891 HISTORY OF UTERINE SCAR FROM PREVIOUS SURGERY: Chronic | ICD-10-CM

## 2024-11-04 PROCEDURE — C8937: CPT

## 2024-11-04 PROCEDURE — A9585: CPT

## 2024-11-04 PROCEDURE — C8908: CPT

## 2024-11-04 PROCEDURE — 77049 MRI BREAST C-+ W/CAD BI: CPT | Mod: 26

## 2024-11-10 ENCOUNTER — OUTPATIENT (OUTPATIENT)
Dept: OUTPATIENT SERVICES | Facility: HOSPITAL | Age: 49
LOS: 1 days | Discharge: HOME | End: 2024-11-10

## 2024-11-10 DIAGNOSIS — Z95.1 PRESENCE OF AORTOCORONARY BYPASS GRAFT: Chronic | ICD-10-CM

## 2024-11-10 DIAGNOSIS — Z98.891 HISTORY OF UTERINE SCAR FROM PREVIOUS SURGERY: Chronic | ICD-10-CM

## 2024-11-10 DIAGNOSIS — C50.919 MALIGNANT NEOPLASM OF UNSPECIFIED SITE OF UNSPECIFIED FEMALE BREAST: ICD-10-CM

## 2024-11-13 ENCOUNTER — APPOINTMENT (OUTPATIENT)
Dept: INTERNAL MEDICINE | Facility: CLINIC | Age: 49
End: 2024-11-13

## 2024-11-13 ENCOUNTER — OUTPATIENT (OUTPATIENT)
Dept: OUTPATIENT SERVICES | Facility: HOSPITAL | Age: 49
LOS: 1 days | End: 2024-11-13

## 2024-11-13 DIAGNOSIS — I10 ESSENTIAL (PRIMARY) HYPERTENSION: ICD-10-CM

## 2024-11-13 DIAGNOSIS — Z95.1 PRESENCE OF AORTOCORONARY BYPASS GRAFT: Chronic | ICD-10-CM

## 2024-11-13 DIAGNOSIS — N95.1 MENOPAUSAL AND FEMALE CLIMACTERIC STATES: ICD-10-CM

## 2024-11-13 DIAGNOSIS — N95.8 OTHER SPECIFIED MENOPAUSAL AND PERIMENOPAUSAL DISORDERS: ICD-10-CM

## 2024-11-13 DIAGNOSIS — Z98.891 HISTORY OF UTERINE SCAR FROM PREVIOUS SURGERY: Chronic | ICD-10-CM

## 2024-11-13 PROCEDURE — 99443: CPT

## 2024-11-13 PROCEDURE — G0463: CPT

## 2024-11-14 ENCOUNTER — APPOINTMENT (OUTPATIENT)
Dept: INFUSION THERAPY | Facility: HOSPITAL | Age: 49
End: 2024-11-14

## 2024-12-05 ENCOUNTER — OUTPATIENT (OUTPATIENT)
Dept: OUTPATIENT SERVICES | Facility: HOSPITAL | Age: 49
LOS: 1 days | End: 2024-12-05
Payer: COMMERCIAL

## 2024-12-05 ENCOUNTER — RESULT REVIEW (OUTPATIENT)
Age: 49
End: 2024-12-05

## 2024-12-05 ENCOUNTER — APPOINTMENT (OUTPATIENT)
Dept: MAMMOGRAPHY | Facility: IMAGING CENTER | Age: 49
End: 2024-12-05
Payer: SELF-PAY

## 2024-12-05 ENCOUNTER — APPOINTMENT (OUTPATIENT)
Dept: ULTRASOUND IMAGING | Facility: IMAGING CENTER | Age: 49
End: 2024-12-05
Payer: SELF-PAY

## 2024-12-05 DIAGNOSIS — C50.919 MALIGNANT NEOPLASM OF UNSPECIFIED SITE OF UNSPECIFIED FEMALE BREAST: ICD-10-CM

## 2024-12-05 DIAGNOSIS — Z95.1 PRESENCE OF AORTOCORONARY BYPASS GRAFT: Chronic | ICD-10-CM

## 2024-12-05 DIAGNOSIS — Z98.891 HISTORY OF UTERINE SCAR FROM PREVIOUS SURGERY: Chronic | ICD-10-CM

## 2024-12-05 PROCEDURE — 76641 ULTRASOUND BREAST COMPLETE: CPT | Mod: 26,50

## 2024-12-05 PROCEDURE — 76641 ULTRASOUND BREAST COMPLETE: CPT

## 2024-12-05 PROCEDURE — G0279: CPT | Mod: 26

## 2024-12-05 PROCEDURE — 77066 DX MAMMO INCL CAD BI: CPT | Mod: 26

## 2024-12-05 PROCEDURE — G0279: CPT

## 2024-12-05 PROCEDURE — 77066 DX MAMMO INCL CAD BI: CPT

## 2024-12-12 ENCOUNTER — APPOINTMENT (OUTPATIENT)
Dept: INFUSION THERAPY | Facility: HOSPITAL | Age: 49
End: 2024-12-12

## 2024-12-13 DIAGNOSIS — R92.8 OTHER ABNORMAL AND INCONCLUSIVE FINDINGS ON DIAGNOSTIC IMAGING OF BREAST: ICD-10-CM

## 2024-12-18 ENCOUNTER — RESULT REVIEW (OUTPATIENT)
Age: 49
End: 2024-12-18

## 2024-12-18 ENCOUNTER — OUTPATIENT (OUTPATIENT)
Dept: OUTPATIENT SERVICES | Facility: HOSPITAL | Age: 49
LOS: 1 days | End: 2024-12-18
Payer: SELF-PAY

## 2024-12-18 ENCOUNTER — APPOINTMENT (OUTPATIENT)
Dept: MAMMOGRAPHY | Facility: IMAGING CENTER | Age: 49
End: 2024-12-18
Payer: SELF-PAY

## 2024-12-18 DIAGNOSIS — R92.8 OTHER ABNORMAL AND INCONCLUSIVE FINDINGS ON DIAGNOSTIC IMAGING OF BREAST: ICD-10-CM

## 2024-12-18 DIAGNOSIS — Z98.891 HISTORY OF UTERINE SCAR FROM PREVIOUS SURGERY: Chronic | ICD-10-CM

## 2024-12-18 DIAGNOSIS — Z95.1 PRESENCE OF AORTOCORONARY BYPASS GRAFT: Chronic | ICD-10-CM

## 2024-12-18 PROCEDURE — 88305 TISSUE EXAM BY PATHOLOGIST: CPT | Mod: 26

## 2024-12-18 PROCEDURE — 19081 BX BREAST 1ST LESION STRTCTC: CPT | Mod: LT

## 2024-12-18 PROCEDURE — 19081 BX BREAST 1ST LESION STRTCTC: CPT

## 2024-12-18 PROCEDURE — 76098 X-RAY EXAM SURGICAL SPECIMEN: CPT | Mod: 26

## 2024-12-18 PROCEDURE — 77065 DX MAMMO INCL CAD UNI: CPT

## 2024-12-18 PROCEDURE — A4648: CPT

## 2024-12-18 PROCEDURE — 77065 DX MAMMO INCL CAD UNI: CPT | Mod: 26,LT

## 2024-12-18 PROCEDURE — 88305 TISSUE EXAM BY PATHOLOGIST: CPT

## 2024-12-24 PROBLEM — F10.90 ALCOHOL USE: Status: INACTIVE | Noted: 2018-08-15

## 2024-12-27 LAB — SURGICAL PATHOLOGY STUDY: SIGNIFICANT CHANGE UP

## 2025-01-07 ENCOUNTER — APPOINTMENT (OUTPATIENT)
Dept: SURGICAL ONCOLOGY | Facility: CLINIC | Age: 50
End: 2025-01-07

## 2025-01-09 ENCOUNTER — OUTPATIENT (OUTPATIENT)
Dept: OUTPATIENT SERVICES | Facility: HOSPITAL | Age: 50
LOS: 1 days | End: 2025-01-09
Payer: COMMERCIAL

## 2025-01-09 ENCOUNTER — APPOINTMENT (OUTPATIENT)
Dept: ULTRASOUND IMAGING | Facility: IMAGING CENTER | Age: 50
End: 2025-01-09
Payer: COMMERCIAL

## 2025-01-09 ENCOUNTER — APPOINTMENT (OUTPATIENT)
Dept: SURGICAL ONCOLOGY | Facility: CLINIC | Age: 50
End: 2025-01-09
Payer: COMMERCIAL

## 2025-01-09 ENCOUNTER — APPOINTMENT (OUTPATIENT)
Dept: INFUSION THERAPY | Facility: HOSPITAL | Age: 50
End: 2025-01-09

## 2025-01-09 VITALS
SYSTOLIC BLOOD PRESSURE: 132 MMHG | HEIGHT: 66 IN | WEIGHT: 174 LBS | DIASTOLIC BLOOD PRESSURE: 83 MMHG | HEART RATE: 76 BPM | OXYGEN SATURATION: 97 % | BODY MASS INDEX: 27.97 KG/M2

## 2025-01-09 DIAGNOSIS — R22.41 LOCALIZED SWELLING, MASS AND LUMP, RIGHT LOWER LIMB: ICD-10-CM

## 2025-01-09 DIAGNOSIS — C50.919 MALIGNANT NEOPLASM OF UNSPECIFIED SITE OF UNSPECIFIED FEMALE BREAST: ICD-10-CM

## 2025-01-09 PROCEDURE — 99213 OFFICE O/P EST LOW 20 MIN: CPT

## 2025-01-09 PROCEDURE — 93971 EXTREMITY STUDY: CPT | Mod: 26,RT

## 2025-01-09 PROCEDURE — 93971 EXTREMITY STUDY: CPT

## 2025-01-23 ENCOUNTER — APPOINTMENT (OUTPATIENT)
Dept: ENDOCRINOLOGY | Facility: HOSPITAL | Age: 50
End: 2025-01-23

## 2025-02-09 ENCOUNTER — EMERGENCY (EMERGENCY)
Facility: HOSPITAL | Age: 50
LOS: 1 days | Discharge: HOME | End: 2025-02-09
Attending: STUDENT IN AN ORGANIZED HEALTH CARE EDUCATION/TRAINING PROGRAM
Payer: MEDICAID

## 2025-02-09 VITALS
RESPIRATION RATE: 18 BRPM | HEART RATE: 81 BPM | OXYGEN SATURATION: 100 % | SYSTOLIC BLOOD PRESSURE: 110 MMHG | TEMPERATURE: 98 F | DIASTOLIC BLOOD PRESSURE: 62 MMHG

## 2025-02-09 VITALS
DIASTOLIC BLOOD PRESSURE: 83 MMHG | OXYGEN SATURATION: 100 % | RESPIRATION RATE: 20 BRPM | TEMPERATURE: 98 F | SYSTOLIC BLOOD PRESSURE: 133 MMHG | HEIGHT: 67 IN | WEIGHT: 173.94 LBS | HEART RATE: 90 BPM

## 2025-02-09 DIAGNOSIS — Z98.891 HISTORY OF UTERINE SCAR FROM PREVIOUS SURGERY: Chronic | ICD-10-CM

## 2025-02-09 DIAGNOSIS — Z95.1 PRESENCE OF AORTOCORONARY BYPASS GRAFT: Chronic | ICD-10-CM

## 2025-02-09 LAB
ALBUMIN SERPL ELPH-MCNC: 4.3 G/DL — SIGNIFICANT CHANGE UP (ref 3.3–5)
ALP SERPL-CCNC: 75 U/L — SIGNIFICANT CHANGE UP (ref 40–120)
ALT FLD-CCNC: 19 U/L — SIGNIFICANT CHANGE UP (ref 10–45)
ANION GAP SERPL CALC-SCNC: 12 MMOL/L — SIGNIFICANT CHANGE UP (ref 5–17)
APPEARANCE UR: CLEAR — SIGNIFICANT CHANGE UP
AST SERPL-CCNC: 20 U/L — SIGNIFICANT CHANGE UP (ref 10–40)
BACTERIA # UR AUTO: ABNORMAL /HPF
BASOPHILS # BLD AUTO: 0.04 K/UL — SIGNIFICANT CHANGE UP (ref 0–0.2)
BASOPHILS NFR BLD AUTO: 0.9 % — SIGNIFICANT CHANGE UP (ref 0–2)
BILIRUB SERPL-MCNC: 0.3 MG/DL — SIGNIFICANT CHANGE UP (ref 0.2–1.2)
BILIRUB UR-MCNC: NEGATIVE — SIGNIFICANT CHANGE UP
BUN SERPL-MCNC: 12 MG/DL — SIGNIFICANT CHANGE UP (ref 7–23)
CALCIUM SERPL-MCNC: 9.9 MG/DL — SIGNIFICANT CHANGE UP (ref 8.4–10.5)
CAST: 1 /LPF — SIGNIFICANT CHANGE UP (ref 0–4)
CHLORIDE SERPL-SCNC: 104 MMOL/L — SIGNIFICANT CHANGE UP (ref 96–108)
CO2 SERPL-SCNC: 25 MMOL/L — SIGNIFICANT CHANGE UP (ref 22–31)
COLOR SPEC: YELLOW — SIGNIFICANT CHANGE UP
CREAT SERPL-MCNC: 0.63 MG/DL — SIGNIFICANT CHANGE UP (ref 0.5–1.3)
DIFF PNL FLD: NEGATIVE — SIGNIFICANT CHANGE UP
EGFR: 110 ML/MIN/1.73M2 — SIGNIFICANT CHANGE UP
EOSINOPHIL # BLD AUTO: 0.08 K/UL — SIGNIFICANT CHANGE UP (ref 0–0.5)
EOSINOPHIL NFR BLD AUTO: 1.7 % — SIGNIFICANT CHANGE UP (ref 0–6)
FLUAV AG NPH QL: DETECTED
FLUBV AG NPH QL: SIGNIFICANT CHANGE UP
GAS PNL BLDV: SIGNIFICANT CHANGE UP
GLUCOSE SERPL-MCNC: 135 MG/DL — HIGH (ref 70–99)
GLUCOSE UR QL: NEGATIVE MG/DL — SIGNIFICANT CHANGE UP
HCG SERPL-ACNC: <2 MIU/ML — SIGNIFICANT CHANGE UP
HCT VFR BLD CALC: 34.8 % — SIGNIFICANT CHANGE UP (ref 34.5–45)
HGB BLD-MCNC: 10.7 G/DL — LOW (ref 11.5–15.5)
KETONES UR-MCNC: NEGATIVE MG/DL — SIGNIFICANT CHANGE UP
LEUKOCYTE ESTERASE UR-ACNC: ABNORMAL
LYMPHOCYTES # BLD AUTO: 2.11 K/UL — SIGNIFICANT CHANGE UP (ref 1–3.3)
LYMPHOCYTES # BLD AUTO: 44.8 % — HIGH (ref 13–44)
MANUAL SMEAR VERIFICATION: SIGNIFICANT CHANGE UP
MCHC RBC-ENTMCNC: 27 PG — SIGNIFICANT CHANGE UP (ref 27–34)
MCHC RBC-ENTMCNC: 30.7 G/DL — LOW (ref 32–36)
MCV RBC AUTO: 87.7 FL — SIGNIFICANT CHANGE UP (ref 80–100)
MONOCYTES # BLD AUTO: 0.45 K/UL — SIGNIFICANT CHANGE UP (ref 0–0.9)
MONOCYTES NFR BLD AUTO: 9.5 % — SIGNIFICANT CHANGE UP (ref 2–14)
NEUTROPHILS # BLD AUTO: 1.95 K/UL — SIGNIFICANT CHANGE UP (ref 1.8–7.4)
NEUTROPHILS NFR BLD AUTO: 41.4 % — LOW (ref 43–77)
NITRITE UR-MCNC: NEGATIVE — SIGNIFICANT CHANGE UP
PH UR: 7.5 — SIGNIFICANT CHANGE UP (ref 5–8)
PLAT MORPH BLD: NORMAL — SIGNIFICANT CHANGE UP
PLATELET # BLD AUTO: 211 K/UL — SIGNIFICANT CHANGE UP (ref 150–400)
POTASSIUM SERPL-MCNC: 4.5 MMOL/L — SIGNIFICANT CHANGE UP (ref 3.5–5.3)
POTASSIUM SERPL-SCNC: 4.5 MMOL/L — SIGNIFICANT CHANGE UP (ref 3.5–5.3)
PROT SERPL-MCNC: 7.7 G/DL — SIGNIFICANT CHANGE UP (ref 6–8.3)
PROT UR-MCNC: NEGATIVE MG/DL — SIGNIFICANT CHANGE UP
RBC # BLD: 3.97 M/UL — SIGNIFICANT CHANGE UP (ref 3.8–5.2)
RBC # FLD: 13.4 % — SIGNIFICANT CHANGE UP (ref 10.3–14.5)
RBC BLD AUTO: SIGNIFICANT CHANGE UP
RBC CASTS # UR COMP ASSIST: 1 /HPF — SIGNIFICANT CHANGE UP (ref 0–4)
REVIEW: SIGNIFICANT CHANGE UP
RSV RNA NPH QL NAA+NON-PROBE: SIGNIFICANT CHANGE UP
SARS-COV-2 RNA SPEC QL NAA+PROBE: SIGNIFICANT CHANGE UP
SODIUM SERPL-SCNC: 141 MMOL/L — SIGNIFICANT CHANGE UP (ref 135–145)
SP GR SPEC: 1.02 — SIGNIFICANT CHANGE UP (ref 1–1.03)
SQUAMOUS # UR AUTO: 2 /HPF — SIGNIFICANT CHANGE UP (ref 0–5)
UROBILINOGEN FLD QL: 0.2 MG/DL — SIGNIFICANT CHANGE UP (ref 0.2–1)
VARIANT LYMPHS # BLD: 1.7 % — SIGNIFICANT CHANGE UP (ref 0–6)
VARIANT LYMPHS NFR BLD MANUAL: 1.7 % — SIGNIFICANT CHANGE UP (ref 0–6)
WBC # BLD: 4.72 K/UL — SIGNIFICANT CHANGE UP (ref 3.8–10.5)
WBC # FLD AUTO: 4.72 K/UL — SIGNIFICANT CHANGE UP (ref 3.8–10.5)
WBC UR QL: 1 /HPF — SIGNIFICANT CHANGE UP (ref 0–5)

## 2025-02-09 PROCEDURE — 71045 X-RAY EXAM CHEST 1 VIEW: CPT | Mod: 26

## 2025-02-09 PROCEDURE — 82435 ASSAY OF BLOOD CHLORIDE: CPT

## 2025-02-09 PROCEDURE — 85014 HEMATOCRIT: CPT

## 2025-02-09 PROCEDURE — 85018 HEMOGLOBIN: CPT

## 2025-02-09 PROCEDURE — 84132 ASSAY OF SERUM POTASSIUM: CPT

## 2025-02-09 PROCEDURE — 82803 BLOOD GASES ANY COMBINATION: CPT

## 2025-02-09 PROCEDURE — 71045 X-RAY EXAM CHEST 1 VIEW: CPT

## 2025-02-09 PROCEDURE — 36000 PLACE NEEDLE IN VEIN: CPT

## 2025-02-09 PROCEDURE — 99285 EMERGENCY DEPT VISIT HI MDM: CPT | Mod: 25

## 2025-02-09 PROCEDURE — 84702 CHORIONIC GONADOTROPIN TEST: CPT

## 2025-02-09 PROCEDURE — 80053 COMPREHEN METABOLIC PANEL: CPT

## 2025-02-09 PROCEDURE — 84295 ASSAY OF SERUM SODIUM: CPT

## 2025-02-09 PROCEDURE — 82330 ASSAY OF CALCIUM: CPT

## 2025-02-09 PROCEDURE — 87086 URINE CULTURE/COLONY COUNT: CPT

## 2025-02-09 PROCEDURE — 93005 ELECTROCARDIOGRAM TRACING: CPT

## 2025-02-09 PROCEDURE — 87040 BLOOD CULTURE FOR BACTERIA: CPT

## 2025-02-09 PROCEDURE — 83605 ASSAY OF LACTIC ACID: CPT

## 2025-02-09 PROCEDURE — 87637 SARSCOV2&INF A&B&RSV AMP PRB: CPT

## 2025-02-09 PROCEDURE — 85025 COMPLETE CBC W/AUTO DIFF WBC: CPT

## 2025-02-09 PROCEDURE — 99285 EMERGENCY DEPT VISIT HI MDM: CPT

## 2025-02-09 PROCEDURE — 81001 URINALYSIS AUTO W/SCOPE: CPT

## 2025-02-09 PROCEDURE — 82947 ASSAY GLUCOSE BLOOD QUANT: CPT

## 2025-02-09 RX ORDER — BACTERIOSTATIC SODIUM CHLORIDE 0.9 %
1000 VIAL (ML) INJECTION ONCE
Refills: 0 | Status: COMPLETED | OUTPATIENT
Start: 2025-02-09 | End: 2025-02-09

## 2025-02-09 RX ORDER — ACETAMINOPHEN 160 MG/5ML
975 SUSPENSION ORAL ONCE
Refills: 0 | Status: COMPLETED | OUTPATIENT
Start: 2025-02-09 | End: 2025-02-09

## 2025-02-09 RX ADMIN — ACETAMINOPHEN 975 MILLIGRAM(S): 160 SUSPENSION ORAL at 14:16

## 2025-02-09 RX ADMIN — Medication 1000 MILLILITER(S): at 15:43

## 2025-02-09 NOTE — ED PROVIDER NOTE - ATTENDING APP SHARED VISIT CONTRIBUTION OF CARE
49F w/  PMH of breast cancer s/p lumpectomy, prior chemo and radiation currently on anastrozole and Lupron, T2DM, HTN, HLD, CAD with prior CABG last TTE in May 2024 w/ biv systolic fn nl LV diastolic fn p/w 5 days of fevers, chills, body aches generalized weakness, pt reports that showing normal biventricular systolic function normal LV diastolic function no severe valvulopathies presents to the ED complaining of 5 days of fevers, chills, body aches, generalized weakness, occasional dizziness with sudden standing, cough, dysuria

## 2025-02-09 NOTE — ED PROVIDER NOTE - NS ED MD DISPO DISCHARGE
"[facility]   General Surgery Progress Note           Assessment and Plan:   Assessment:   Cecal Volvulus; h/o longstanding IBS and constipation  -1 Day Post-Op Exploratory Laparotomy, Right colectomy; Pathology: pending  -Postoperative hypoxia, supplemental oxygen; weaned/resolved  -Hypotension, orthostatic symptoms; degree of dehydration likely  -Afebrile      Plan:   -IV fluids: Lactated Ringer @100mL/hr.  Add bolus.  -Pain management: acetaminophen, ibuprofen, ice packs, add robaxin  -Add throat spray  -Prophylaxis: Enoxaparin (Lovenox) SQ, PCDs  -Diet: restart Clear liquids, sips today  -Advance activity as tolerated, encouraged OOB and ambulate 3-4 times a day  -IS hourly as able, encouraged  -Dispo: 1-3 days when nausea resolved, tolerating diet         Interval History:   Not needing antiemetics today, but still occasional mild nausea, no emesis or dry heaves.  Felt somewhat lightheaded earlier when she was up to walk.  Urine has dark appearance and BPs a bit on low side.  Discussed increase in IVF.  Will also try sips of clears today, throat spray.  She is avoiding narcotics, trying ice/acetaminophen, ibuprofen, will add robaxin for muscle pain.           Physical Exam:   Blood pressure 95/64, pulse 79, temperature 98.7  F (37.1  C), temperature source Oral, resp. rate 18, height 1.727 m (5' 8\"), weight 66.5 kg (146 lb 9.7 oz), last menstrual period 04/24/2024, SpO2 93%, not currently breastfeeding.  I/O last 3 completed shifts:  In: -   Out: 200 [Urine:200]  Abdomen:   Soft, no distension, hypoactive bowel sounds  Midline Incision - clean, dry, skin glue intact, no bruising/erythema             Data:   Pathology: pending    Recent Labs   Lab 04/29/24  2151   WBC 10.3   HGB 13.4   HCT 40.4   *        Recent Labs   Lab 05/01/24  0406 04/30/24  0640 04/30/24  0608 04/29/24  2151   NA  --   --   --  137   POTASSIUM  --   --   --  3.8   CHLORIDE  --   --   --  100   CO2  --   --   --  " 22   ANIONGAP  --   --   --  15   *  --  147* 137*   BUN  --   --   --  12.6   CR  --  0.69  --  0.81   GFRESTIMATED  --  >90  --  >90   IVAN  --   --   --  9.4   MAG  --   --   --  1.9   PROTTOTAL  --   --   --  7.3   ALBUMIN  --   --   --  4.4   BILITOTAL  --   --   --  1.3*   ALKPHOS  --   --   --  76   AST  --   --   --  46*   ALT  --   --   --  54*       DEMAR Berg or Text page: [phone], 8 am to 4 pm  After 4 pm, call [phone]         Home

## 2025-02-09 NOTE — ED PROVIDER NOTE - NSFOLLOWUPINSTRUCTIONS_ED_ALL_ED_FT
Follow-up with your primary care provider within 2-3 days.     For cold symptoms you may take ceterizine (Zyrtec) over the counter, take one pill daily as directed for symptoms. Additionally you may take Flonase (fluticasone propionate) over the counter as directed, 2 sprays in each nostril for 1 week.    Pain and fever can be managed with Acetaminophen (aka Tylenol) 650mg (2 regular strength tablets) every 6 hours and/or ibuprofen (aka Motrin or Advil)  400mg (2 regular strength tablets) every 6-8hours.    Continue to take all other medications as directed.    Return to the emergency room immediately if your symptoms worsen or persist, or if you have fever despite using tylenol and ibuprofen, headache, chest pain, back pain, palpitations, severe vomiting, loss of consciousness (passing out) or if any other concerning or questionable symptoms.

## 2025-02-09 NOTE — ED ADULT NURSE NOTE - CADM POA PRESS ULCER
-- DO NOT REPLY / DO NOT REPLY ALL --  -- Message is from Engagement Center Operations (ECO) --    ONLY TO BE USED WITHIN A TELEPHONE ENCOUNTER    Pharmacy Call    Name of medication requested: vitamin D3        Full name of the provider who ordered the medication: Zaida Stacy NP    Preferred Pharmacy: Pharmacy  [facility] [address] [facility] [address] [address]    Patient confirmed the above pharmacy as correct?  Yes      Reason for call: clarification on the set of directions     Clinic site name / Account # for provider: [facility]     No

## 2025-02-09 NOTE — ED PROVIDER NOTE - PHYSICAL EXAMINATION
GEN: Pt in NAD  PSYCH: Affect appropriate.  EYES: Sclera white w/o injection.   ENT: Head NCAT. Neck supple FROM.  RESP: CTA b/l, no wheezes, rales, or rhonchi.   CARDIAC: RRR, clear distinct S1, S2, no appreciable murmurs.  ABD: Abdomen soft, non-tender.  MSK: Mild L thoracolumbar paraspinal muscle ttp.  VASC: No edema or calf tenderness.  SKIN: No notable rash.

## 2025-02-09 NOTE — ED ADULT NURSE NOTE - OBJECTIVE STATEMENT
50yo F aaox4 h/o CABG, DM, breast CA, presents ambulatory to Ed from home, as per pt 5 days ago onset : generalized weakness, body aches, fever , productive cough, L flank pain 48yo F aaox4 h/o CABG, DM, breast CA, presents ambulatory to Ed from home, as per pt 5 days ago onset : generalized weakness, body aches, fever , productive cough, L flank pain, frequency urinations ( denies: burning/painful/hematuria  )  on examination Pt denies CP, HA, vision changes, n/v/d, Safety and comfort measures initiated- bed placed in lowest position and side rails raised. Pt oriented to call bell system.

## 2025-02-09 NOTE — ED ADULT NURSE NOTE - ED STAT RN HANDOFF DETAILS
1430 Report given to receiving change of shift JENN KANG  patient is in no acute distress. Patient vital signs stable, plan of care explained.

## 2025-02-09 NOTE — ED ADULT TRIAGE NOTE - ESI TRIAGE ACUITY LEVEL, MLM
T/c with Home care -foot injury- swollen and echymosis-recommend mobile xray of the foot-r/o Fx    3

## 2025-02-09 NOTE — ED PROVIDER NOTE - OBJECTIVE STATEMENT
49-year-old female PMH of breast cancer prior chemo and radiation currently on anastrozole and Lupron, T2DM, HTN, HLD, CAD with prior CABG last TTE in May 2024 showing normal biventricular systolic function normal LV diastolic function no severe valvulopathies presents to the ED complaining of 5 days of fevers, chills, body aches, generalized weakness, occasional dizziness with sudden standing, cough, dysuria.  Patient denies abdominal pain, nausea, vomiting, diarrhea, chest pain, rash. not on abx, no pain meds taken PTA.

## 2025-02-09 NOTE — ED PROVIDER NOTE - PATIENT PORTAL LINK FT
You can access the FollowMyHealth Patient Portal offered by [facility] by registering at the following website: [email or web address]. By joining [employer]’s FollowMyHealth portal, you will also be able to view your health information using other applications (apps) compatible with our system.

## 2025-02-09 NOTE — ED PROVIDER NOTE - PROGRESS NOTE DETAILS
Patient reassessed and feeling well, found to be influenza A positive.  CXR independently visualized showing no consolidation to suggest bacterial pneumonia.  Patient is outside the window for Tamiflu.  Patient feeling improvement with medications ready and stable for discharge. -Kaushik Trujillo PA-C

## 2025-02-10 LAB
CULTURE RESULTS: SIGNIFICANT CHANGE UP
SPECIMEN SOURCE: SIGNIFICANT CHANGE UP

## 2025-02-13 ENCOUNTER — APPOINTMENT (OUTPATIENT)
Dept: INFUSION THERAPY | Facility: HOSPITAL | Age: 50
End: 2025-02-13

## 2025-02-14 LAB
CULTURE RESULTS: SIGNIFICANT CHANGE UP
CULTURE RESULTS: SIGNIFICANT CHANGE UP
SPECIMEN SOURCE: SIGNIFICANT CHANGE UP
SPECIMEN SOURCE: SIGNIFICANT CHANGE UP

## 2025-02-27 ENCOUNTER — APPOINTMENT (OUTPATIENT)
Dept: ENDOCRINOLOGY | Facility: HOSPITAL | Age: 50
End: 2025-02-27
Payer: COMMERCIAL

## 2025-02-27 ENCOUNTER — OUTPATIENT (OUTPATIENT)
Dept: OUTPATIENT SERVICES | Facility: HOSPITAL | Age: 50
LOS: 1 days | End: 2025-02-27
Payer: SELF-PAY

## 2025-02-27 VITALS
WEIGHT: 176 LBS | BODY MASS INDEX: 28.28 KG/M2 | TEMPERATURE: 98 F | HEIGHT: 66 IN | SYSTOLIC BLOOD PRESSURE: 109 MMHG | RESPIRATION RATE: 16 BRPM | OXYGEN SATURATION: 100 % | HEART RATE: 92 BPM | DIASTOLIC BLOOD PRESSURE: 74 MMHG

## 2025-02-27 DIAGNOSIS — E11.9 TYPE 2 DIABETES MELLITUS WITHOUT COMPLICATIONS: ICD-10-CM

## 2025-02-27 DIAGNOSIS — E11.9 TYPE 2 DIABETES MELLITUS W/OUT COMPLICATIONS: ICD-10-CM

## 2025-02-27 DIAGNOSIS — Z95.1 PRESENCE OF AORTOCORONARY BYPASS GRAFT: Chronic | ICD-10-CM

## 2025-02-27 DIAGNOSIS — E78.00 PURE HYPERCHOLESTEROLEMIA, UNSPECIFIED: ICD-10-CM

## 2025-02-27 DIAGNOSIS — E34.9 ENDOCRINE DISORDER, UNSPECIFIED: ICD-10-CM

## 2025-02-27 LAB — HBA1C MFR BLD HPLC: 7.5

## 2025-02-27 PROCEDURE — ZZZZZ: CPT | Mod: GC

## 2025-02-27 PROCEDURE — G0463: CPT

## 2025-02-27 RX ORDER — LANCING DEVICE
EACH MISCELLANEOUS
Qty: 90 | Refills: 1 | Status: ACTIVE | COMMUNITY
Start: 2025-02-27 | End: 1900-01-01

## 2025-02-27 RX ORDER — LANCING DEVICE
W/DEVICE EACH MISCELLANEOUS
Qty: 1 | Refills: 0 | Status: ACTIVE | COMMUNITY
Start: 2025-02-27 | End: 1900-01-01

## 2025-02-27 RX ORDER — DULAGLUTIDE 4.5 MG/.5ML
4.5 INJECTION, SOLUTION SUBCUTANEOUS
Qty: 8 | Refills: 2 | Status: ACTIVE | COMMUNITY
Start: 2025-02-27 | End: 1900-01-01

## 2025-03-03 ENCOUNTER — NON-APPOINTMENT (OUTPATIENT)
Age: 50
End: 2025-03-03

## 2025-03-03 ENCOUNTER — APPOINTMENT (OUTPATIENT)
Dept: CARDIOLOGY | Facility: CLINIC | Age: 50
End: 2025-03-03
Payer: COMMERCIAL

## 2025-03-03 VITALS — SYSTOLIC BLOOD PRESSURE: 110 MMHG | HEART RATE: 91 BPM | OXYGEN SATURATION: 100 % | DIASTOLIC BLOOD PRESSURE: 75 MMHG

## 2025-03-03 VITALS — BODY MASS INDEX: 27.64 KG/M2 | WEIGHT: 172 LBS | HEIGHT: 66 IN

## 2025-03-03 DIAGNOSIS — Z95.1 PRESENCE OF AORTOCORONARY BYPASS GRAFT: ICD-10-CM

## 2025-03-03 DIAGNOSIS — E78.00 PURE HYPERCHOLESTEROLEMIA, UNSPECIFIED: ICD-10-CM

## 2025-03-03 PROCEDURE — 93000 ELECTROCARDIOGRAM COMPLETE: CPT

## 2025-03-03 PROCEDURE — 99214 OFFICE O/P EST MOD 30 MIN: CPT

## 2025-03-13 ENCOUNTER — APPOINTMENT (OUTPATIENT)
Dept: INFUSION THERAPY | Facility: HOSPITAL | Age: 50
End: 2025-03-13

## 2025-04-06 ENCOUNTER — OUTPATIENT (OUTPATIENT)
Dept: OUTPATIENT SERVICES | Facility: HOSPITAL | Age: 50
LOS: 1 days | Discharge: HOME | End: 2025-04-06

## 2025-04-06 DIAGNOSIS — C50.919 MALIGNANT NEOPLASM OF UNSPECIFIED SITE OF UNSPECIFIED FEMALE BREAST: ICD-10-CM

## 2025-04-06 DIAGNOSIS — Z95.1 PRESENCE OF AORTOCORONARY BYPASS GRAFT: Chronic | ICD-10-CM

## 2025-04-06 DIAGNOSIS — Z98.891 HISTORY OF UTERINE SCAR FROM PREVIOUS SURGERY: Chronic | ICD-10-CM

## 2025-04-10 ENCOUNTER — APPOINTMENT (OUTPATIENT)
Dept: INFUSION THERAPY | Facility: HOSPITAL | Age: 50
End: 2025-04-10

## 2025-04-17 ENCOUNTER — APPOINTMENT (OUTPATIENT)
Dept: SURGICAL ONCOLOGY | Facility: CLINIC | Age: 50
End: 2025-04-17

## 2025-04-29 ENCOUNTER — APPOINTMENT (OUTPATIENT)
Dept: HEMATOLOGY ONCOLOGY | Facility: CLINIC | Age: 50
End: 2025-04-29
Payer: COMMERCIAL

## 2025-04-29 VITALS
HEIGHT: 66.14 IN | TEMPERATURE: 97.7 F | SYSTOLIC BLOOD PRESSURE: 116 MMHG | HEART RATE: 96 BPM | DIASTOLIC BLOOD PRESSURE: 77 MMHG | RESPIRATION RATE: 16 BRPM | BODY MASS INDEX: 27.28 KG/M2 | OXYGEN SATURATION: 100 % | WEIGHT: 169.75 LBS

## 2025-04-29 DIAGNOSIS — C50.919 MALIGNANT NEOPLASM OF UNSPECIFIED SITE OF UNSPECIFIED FEMALE BREAST: ICD-10-CM

## 2025-04-29 DIAGNOSIS — Z79.899 OTHER LONG TERM (CURRENT) DRUG THERAPY: ICD-10-CM

## 2025-04-29 PROCEDURE — 99214 OFFICE O/P EST MOD 30 MIN: CPT

## 2025-05-06 ENCOUNTER — APPOINTMENT (OUTPATIENT)
Dept: INFUSION THERAPY | Facility: HOSPITAL | Age: 50
End: 2025-05-06

## 2025-06-03 ENCOUNTER — APPOINTMENT (OUTPATIENT)
Dept: INFUSION THERAPY | Facility: HOSPITAL | Age: 50
End: 2025-06-03

## 2025-06-19 ENCOUNTER — APPOINTMENT (OUTPATIENT)
Dept: MAMMOGRAPHY | Facility: IMAGING CENTER | Age: 50
End: 2025-06-19

## 2025-06-19 ENCOUNTER — RESULT REVIEW (OUTPATIENT)
Age: 50
End: 2025-06-19

## 2025-06-19 ENCOUNTER — OUTPATIENT (OUTPATIENT)
Dept: OUTPATIENT SERVICES | Facility: HOSPITAL | Age: 50
LOS: 1 days | End: 2025-06-19
Payer: SELF-PAY

## 2025-06-19 DIAGNOSIS — Z98.891 HISTORY OF UTERINE SCAR FROM PREVIOUS SURGERY: Chronic | ICD-10-CM

## 2025-06-19 DIAGNOSIS — Z00.8 ENCOUNTER FOR OTHER GENERAL EXAMINATION: ICD-10-CM

## 2025-06-19 DIAGNOSIS — Z95.1 PRESENCE OF AORTOCORONARY BYPASS GRAFT: Chronic | ICD-10-CM

## 2025-06-19 DIAGNOSIS — R92.8 OTHER ABNORMAL AND INCONCLUSIVE FINDINGS ON DIAGNOSTIC IMAGING OF BREAST: ICD-10-CM

## 2025-06-19 PROCEDURE — 77065 DX MAMMO INCL CAD UNI: CPT

## 2025-06-19 PROCEDURE — G0279: CPT | Mod: 26

## 2025-06-19 PROCEDURE — G0279: CPT

## 2025-06-19 PROCEDURE — 77065 DX MAMMO INCL CAD UNI: CPT | Mod: 26,LT

## 2025-06-24 ENCOUNTER — OUTPATIENT (OUTPATIENT)
Dept: OUTPATIENT SERVICES | Facility: HOSPITAL | Age: 50
LOS: 1 days | Discharge: HOME | End: 2025-06-24

## 2025-06-24 ENCOUNTER — NON-APPOINTMENT (OUTPATIENT)
Age: 50
End: 2025-06-24

## 2025-06-24 ENCOUNTER — APPOINTMENT (OUTPATIENT)
Dept: SURGICAL ONCOLOGY | Facility: CLINIC | Age: 50
End: 2025-06-24
Payer: COMMERCIAL

## 2025-06-24 VITALS
OXYGEN SATURATION: 99 % | BODY MASS INDEX: 25.39 KG/M2 | HEIGHT: 66 IN | HEART RATE: 85 BPM | DIASTOLIC BLOOD PRESSURE: 75 MMHG | RESPIRATION RATE: 16 BRPM | SYSTOLIC BLOOD PRESSURE: 110 MMHG | WEIGHT: 158 LBS

## 2025-06-24 VITALS
OXYGEN SATURATION: 99 % | SYSTOLIC BLOOD PRESSURE: 110 MMHG | HEIGHT: 66 IN | BODY MASS INDEX: 25.39 KG/M2 | HEART RATE: 85 BPM | RESPIRATION RATE: 18 BRPM | WEIGHT: 158 LBS | DIASTOLIC BLOOD PRESSURE: 75 MMHG

## 2025-06-24 DIAGNOSIS — C50.919 MALIGNANT NEOPLASM OF UNSPECIFIED SITE OF UNSPECIFIED FEMALE BREAST: ICD-10-CM

## 2025-06-24 DIAGNOSIS — Z95.1 PRESENCE OF AORTOCORONARY BYPASS GRAFT: Chronic | ICD-10-CM

## 2025-06-24 DIAGNOSIS — Z98.891 HISTORY OF UTERINE SCAR FROM PREVIOUS SURGERY: Chronic | ICD-10-CM

## 2025-06-24 PROCEDURE — 99213 OFFICE O/P EST LOW 20 MIN: CPT

## 2025-07-01 ENCOUNTER — APPOINTMENT (OUTPATIENT)
Dept: INFUSION THERAPY | Facility: HOSPITAL | Age: 50
End: 2025-07-01

## 2025-07-07 ENCOUNTER — APPOINTMENT (OUTPATIENT)
Dept: PODIATRY | Facility: HOSPITAL | Age: 50
End: 2025-07-07
Payer: COMMERCIAL

## 2025-07-07 ENCOUNTER — APPOINTMENT (OUTPATIENT)
Dept: CARDIOLOGY | Facility: CLINIC | Age: 50
End: 2025-07-07
Payer: COMMERCIAL

## 2025-07-07 ENCOUNTER — NON-APPOINTMENT (OUTPATIENT)
Age: 50
End: 2025-07-07

## 2025-07-07 ENCOUNTER — OUTPATIENT (OUTPATIENT)
Dept: OUTPATIENT SERVICES | Facility: HOSPITAL | Age: 50
LOS: 1 days | End: 2025-07-07
Payer: SELF-PAY

## 2025-07-07 VITALS
TEMPERATURE: 97.6 F | OXYGEN SATURATION: 98 % | BODY MASS INDEX: 28.12 KG/M2 | DIASTOLIC BLOOD PRESSURE: 72 MMHG | HEIGHT: 66 IN | WEIGHT: 175 LBS | HEART RATE: 69 BPM | RESPIRATION RATE: 16 BRPM | SYSTOLIC BLOOD PRESSURE: 118 MMHG

## 2025-07-07 VITALS — DIASTOLIC BLOOD PRESSURE: 67 MMHG | OXYGEN SATURATION: 100 % | SYSTOLIC BLOOD PRESSURE: 100 MMHG | HEART RATE: 91 BPM

## 2025-07-07 VITALS
BODY MASS INDEX: 28.25 KG/M2 | SYSTOLIC BLOOD PRESSURE: 100 MMHG | HEART RATE: 91 BPM | OXYGEN SATURATION: 100 % | WEIGHT: 175 LBS | DIASTOLIC BLOOD PRESSURE: 67 MMHG

## 2025-07-07 DIAGNOSIS — Z95.1 PRESENCE OF AORTOCORONARY BYPASS GRAFT: Chronic | ICD-10-CM

## 2025-07-07 DIAGNOSIS — E11.9 TYPE 2 DIABETES MELLITUS WITHOUT COMPLICATIONS: ICD-10-CM

## 2025-07-07 DIAGNOSIS — M20.41 OTHER HAMMER TOE(S) (ACQUIRED), RIGHT FOOT: ICD-10-CM

## 2025-07-07 DIAGNOSIS — M20.42 OTHER HAMMER TOE(S) (ACQUIRED), LEFT FOOT: ICD-10-CM

## 2025-07-07 DIAGNOSIS — Q66.71 CONGENITAL PES CAVUS, RIGHT FOOT: ICD-10-CM

## 2025-07-07 DIAGNOSIS — Z98.891 HISTORY OF UTERINE SCAR FROM PREVIOUS SURGERY: Chronic | ICD-10-CM

## 2025-07-07 DIAGNOSIS — M79.609 PAIN IN UNSPECIFIED LIMB: ICD-10-CM

## 2025-07-07 PROCEDURE — G0463: CPT

## 2025-07-07 PROCEDURE — 93000 ELECTROCARDIOGRAM COMPLETE: CPT

## 2025-07-07 PROCEDURE — 99203 OFFICE O/P NEW LOW 30 MIN: CPT

## 2025-07-07 PROCEDURE — 99214 OFFICE O/P EST MOD 30 MIN: CPT

## 2025-07-29 ENCOUNTER — APPOINTMENT (OUTPATIENT)
Dept: INFUSION THERAPY | Facility: HOSPITAL | Age: 50
End: 2025-07-29

## 2025-08-07 ENCOUNTER — EMERGENCY (EMERGENCY)
Facility: HOSPITAL | Age: 50
LOS: 1 days | End: 2025-08-07
Attending: EMERGENCY MEDICINE | Admitting: STUDENT IN AN ORGANIZED HEALTH CARE EDUCATION/TRAINING PROGRAM
Payer: MEDICAID

## 2025-08-07 VITALS
RESPIRATION RATE: 18 BRPM | HEART RATE: 74 BPM | TEMPERATURE: 98 F | HEIGHT: 67 IN | SYSTOLIC BLOOD PRESSURE: 139 MMHG | DIASTOLIC BLOOD PRESSURE: 81 MMHG | OXYGEN SATURATION: 100 % | WEIGHT: 175.05 LBS

## 2025-08-07 DIAGNOSIS — Z98.890 OTHER SPECIFIED POSTPROCEDURAL STATES: Chronic | ICD-10-CM

## 2025-08-07 DIAGNOSIS — Z98.891 HISTORY OF UTERINE SCAR FROM PREVIOUS SURGERY: Chronic | ICD-10-CM

## 2025-08-07 DIAGNOSIS — Z95.1 PRESENCE OF AORTOCORONARY BYPASS GRAFT: Chronic | ICD-10-CM

## 2025-08-07 LAB
ADD ON TEST-SPECIMEN IN LAB: SIGNIFICANT CHANGE UP
ALBUMIN SERPL ELPH-MCNC: 4.1 G/DL — SIGNIFICANT CHANGE UP (ref 3.3–5)
ALP SERPL-CCNC: 89 U/L — SIGNIFICANT CHANGE UP (ref 40–120)
ALT FLD-CCNC: 21 U/L — SIGNIFICANT CHANGE UP (ref 10–45)
ANION GAP SERPL CALC-SCNC: 12 MMOL/L — SIGNIFICANT CHANGE UP (ref 5–17)
AST SERPL-CCNC: 17 U/L — SIGNIFICANT CHANGE UP (ref 10–40)
BASOPHILS # BLD AUTO: 0.03 K/UL — SIGNIFICANT CHANGE UP (ref 0–0.2)
BASOPHILS NFR BLD AUTO: 0.5 % — SIGNIFICANT CHANGE UP (ref 0–2)
BILIRUB SERPL-MCNC: 0.4 MG/DL — SIGNIFICANT CHANGE UP (ref 0.2–1.2)
BUN SERPL-MCNC: 15 MG/DL — SIGNIFICANT CHANGE UP (ref 7–23)
CALCIUM SERPL-MCNC: 9.3 MG/DL — SIGNIFICANT CHANGE UP (ref 8.4–10.5)
CHLORIDE SERPL-SCNC: 104 MMOL/L — SIGNIFICANT CHANGE UP (ref 96–108)
CO2 SERPL-SCNC: 24 MMOL/L — SIGNIFICANT CHANGE UP (ref 22–31)
CREAT SERPL-MCNC: 0.51 MG/DL — SIGNIFICANT CHANGE UP (ref 0.5–1.3)
EGFR: 115 ML/MIN/1.73M2 — SIGNIFICANT CHANGE UP
EGFR: 115 ML/MIN/1.73M2 — SIGNIFICANT CHANGE UP
EOSINOPHIL # BLD AUTO: 0.11 K/UL — SIGNIFICANT CHANGE UP (ref 0–0.5)
EOSINOPHIL NFR BLD AUTO: 1.7 % — SIGNIFICANT CHANGE UP (ref 0–6)
GLUCOSE SERPL-MCNC: 180 MG/DL — HIGH (ref 70–99)
HCG SERPL-ACNC: <2 MIU/ML — SIGNIFICANT CHANGE UP
HCT VFR BLD CALC: 30.2 % — LOW (ref 34.5–45)
HGB BLD-MCNC: 9.2 G/DL — LOW (ref 11.5–15.5)
IMM GRANULOCYTES # BLD AUTO: 0.01 K/UL — SIGNIFICANT CHANGE UP (ref 0–0.07)
IMM GRANULOCYTES NFR BLD AUTO: 0.2 % — SIGNIFICANT CHANGE UP (ref 0–0.9)
LYMPHOCYTES # BLD AUTO: 2.74 K/UL — SIGNIFICANT CHANGE UP (ref 1–3.3)
LYMPHOCYTES NFR BLD AUTO: 42.3 % — SIGNIFICANT CHANGE UP (ref 13–44)
MCHC RBC-ENTMCNC: 26.7 PG — LOW (ref 27–34)
MCHC RBC-ENTMCNC: 30.5 G/DL — LOW (ref 32–36)
MCV RBC AUTO: 87.8 FL — SIGNIFICANT CHANGE UP (ref 80–100)
MONOCYTES # BLD AUTO: 0.5 K/UL — SIGNIFICANT CHANGE UP (ref 0–0.9)
MONOCYTES NFR BLD AUTO: 7.7 % — SIGNIFICANT CHANGE UP (ref 2–14)
NEUTROPHILS # BLD AUTO: 3.08 K/UL — SIGNIFICANT CHANGE UP (ref 1.8–7.4)
NEUTROPHILS NFR BLD AUTO: 47.6 % — SIGNIFICANT CHANGE UP (ref 43–77)
NRBC # BLD AUTO: 0 K/UL — SIGNIFICANT CHANGE UP (ref 0–0)
NRBC # FLD: 0 K/UL — SIGNIFICANT CHANGE UP (ref 0–0)
NRBC BLD AUTO-RTO: 0 /100 WBCS — SIGNIFICANT CHANGE UP (ref 0–0)
PLATELET # BLD AUTO: 190 K/UL — SIGNIFICANT CHANGE UP (ref 150–400)
PMV BLD: 11 FL — SIGNIFICANT CHANGE UP (ref 7–13)
POTASSIUM SERPL-MCNC: 4.1 MMOL/L — SIGNIFICANT CHANGE UP (ref 3.5–5.3)
POTASSIUM SERPL-SCNC: 4.1 MMOL/L — SIGNIFICANT CHANGE UP (ref 3.5–5.3)
PROT SERPL-MCNC: 7.2 G/DL — SIGNIFICANT CHANGE UP (ref 6–8.3)
RBC # BLD: 3.44 M/UL — LOW (ref 3.8–5.2)
RBC # FLD: 13.8 % — SIGNIFICANT CHANGE UP (ref 10.3–14.5)
SODIUM SERPL-SCNC: 140 MMOL/L — SIGNIFICANT CHANGE UP (ref 135–145)
TROPONIN T, HIGH SENSITIVITY RESULT: 7 NG/L — SIGNIFICANT CHANGE UP (ref 0–51)
WBC # BLD: 6.47 K/UL — SIGNIFICANT CHANGE UP (ref 3.8–10.5)
WBC # FLD AUTO: 6.47 K/UL — SIGNIFICANT CHANGE UP (ref 3.8–10.5)

## 2025-08-07 PROCEDURE — 99285 EMERGENCY DEPT VISIT HI MDM: CPT

## 2025-08-07 RX ORDER — ACETAMINOPHEN 500 MG/5ML
1000 LIQUID (ML) ORAL ONCE
Refills: 0 | Status: COMPLETED | OUTPATIENT
Start: 2025-08-07 | End: 2025-08-07

## 2025-08-07 RX ADMIN — Medication 400 MILLIGRAM(S): at 22:52

## 2025-08-07 RX ADMIN — Medication 1000 MILLILITER(S): at 22:53

## 2025-08-08 ENCOUNTER — RESULT REVIEW (OUTPATIENT)
Age: 50
End: 2025-08-08

## 2025-08-08 ENCOUNTER — TRANSCRIPTION ENCOUNTER (OUTPATIENT)
Age: 50
End: 2025-08-08

## 2025-08-08 VITALS
HEART RATE: 68 BPM | TEMPERATURE: 97 F | RESPIRATION RATE: 18 BRPM | DIASTOLIC BLOOD PRESSURE: 80 MMHG | SYSTOLIC BLOOD PRESSURE: 122 MMHG | OXYGEN SATURATION: 100 %

## 2025-08-08 DIAGNOSIS — R07.9 CHEST PAIN, UNSPECIFIED: ICD-10-CM

## 2025-08-08 DIAGNOSIS — Z29.9 ENCOUNTER FOR PROPHYLACTIC MEASURES, UNSPECIFIED: ICD-10-CM

## 2025-08-08 DIAGNOSIS — I10 ESSENTIAL (PRIMARY) HYPERTENSION: ICD-10-CM

## 2025-08-08 DIAGNOSIS — C50.919 MALIGNANT NEOPLASM OF UNSPECIFIED SITE OF UNSPECIFIED FEMALE BREAST: ICD-10-CM

## 2025-08-08 DIAGNOSIS — E11.9 TYPE 2 DIABETES MELLITUS WITHOUT COMPLICATIONS: ICD-10-CM

## 2025-08-08 DIAGNOSIS — E78.5 HYPERLIPIDEMIA, UNSPECIFIED: ICD-10-CM

## 2025-08-08 DIAGNOSIS — I25.10 ATHEROSCLEROTIC HEART DISEASE OF NATIVE CORONARY ARTERY WITHOUT ANGINA PECTORIS: ICD-10-CM

## 2025-08-08 LAB
CK MB CFR SERPL CALC: 2.1 NG/ML — SIGNIFICANT CHANGE UP (ref 0–3.8)
CK SERPL-CCNC: 90 U/L — SIGNIFICANT CHANGE UP (ref 25–170)
GLUCOSE BLDC GLUCOMTR-MCNC: 152 MG/DL — HIGH (ref 70–99)
TROPONIN T, HIGH SENSITIVITY RESULT: 7 NG/L — SIGNIFICANT CHANGE UP (ref 0–51)

## 2025-08-08 PROCEDURE — 99285 EMERGENCY DEPT VISIT HI MDM: CPT | Mod: 25

## 2025-08-08 PROCEDURE — 82550 ASSAY OF CK (CPK): CPT

## 2025-08-08 PROCEDURE — 71275 CT ANGIOGRAPHY CHEST: CPT

## 2025-08-08 PROCEDURE — 93306 TTE W/DOPPLER COMPLETE: CPT

## 2025-08-08 PROCEDURE — 83880 ASSAY OF NATRIURETIC PEPTIDE: CPT

## 2025-08-08 PROCEDURE — 84484 ASSAY OF TROPONIN QUANT: CPT

## 2025-08-08 PROCEDURE — 85025 COMPLETE CBC W/AUTO DIFF WBC: CPT

## 2025-08-08 PROCEDURE — 84702 CHORIONIC GONADOTROPIN TEST: CPT

## 2025-08-08 PROCEDURE — 82962 GLUCOSE BLOOD TEST: CPT

## 2025-08-08 PROCEDURE — 93017 CV STRESS TEST TRACING ONLY: CPT

## 2025-08-08 PROCEDURE — A9500: CPT

## 2025-08-08 PROCEDURE — 93005 ELECTROCARDIOGRAM TRACING: CPT

## 2025-08-08 PROCEDURE — 96374 THER/PROPH/DIAG INJ IV PUSH: CPT | Mod: XU

## 2025-08-08 PROCEDURE — 76376 3D RENDER W/INTRP POSTPROCES: CPT

## 2025-08-08 PROCEDURE — 80053 COMPREHEN METABOLIC PANEL: CPT

## 2025-08-08 PROCEDURE — 82553 CREATINE MB FRACTION: CPT

## 2025-08-08 PROCEDURE — 78452 HT MUSCLE IMAGE SPECT MULT: CPT

## 2025-08-08 RX ORDER — ASPIRIN 325 MG
81 TABLET ORAL DAILY
Refills: 0 | Status: DISCONTINUED | OUTPATIENT
Start: 2025-08-08 | End: 2025-08-08

## 2025-08-08 RX ORDER — DEXTROSE 50 % IN WATER 50 %
25 SYRINGE (ML) INTRAVENOUS ONCE
Refills: 0 | Status: DISCONTINUED | OUTPATIENT
Start: 2025-08-08 | End: 2025-08-08

## 2025-08-08 RX ORDER — INSULIN LISPRO 100 U/ML
INJECTION, SOLUTION INTRAVENOUS; SUBCUTANEOUS AT BEDTIME
Refills: 0 | Status: DISCONTINUED | OUTPATIENT
Start: 2025-08-08 | End: 2025-08-08

## 2025-08-08 RX ORDER — DEXTROSE 50 % IN WATER 50 %
12.5 SYRINGE (ML) INTRAVENOUS ONCE
Refills: 0 | Status: DISCONTINUED | OUTPATIENT
Start: 2025-08-08 | End: 2025-08-08

## 2025-08-08 RX ORDER — ACETAMINOPHEN 500 MG/5ML
650 LIQUID (ML) ORAL EVERY 6 HOURS
Refills: 0 | Status: DISCONTINUED | OUTPATIENT
Start: 2025-08-08 | End: 2025-08-08

## 2025-08-08 RX ORDER — INSULIN LISPRO 100 U/ML
INJECTION, SOLUTION INTRAVENOUS; SUBCUTANEOUS
Refills: 0 | Status: DISCONTINUED | OUTPATIENT
Start: 2025-08-08 | End: 2025-08-08

## 2025-08-08 RX ORDER — ENOXAPARIN SODIUM 100 MG/ML
40 INJECTION SUBCUTANEOUS EVERY 24 HOURS
Refills: 0 | Status: DISCONTINUED | OUTPATIENT
Start: 2025-08-08 | End: 2025-08-08

## 2025-08-08 RX ORDER — SODIUM CHLORIDE 9 G/1000ML
1000 INJECTION, SOLUTION INTRAVENOUS
Refills: 0 | Status: DISCONTINUED | OUTPATIENT
Start: 2025-08-08 | End: 2025-08-08

## 2025-08-08 RX ORDER — GLUCAGON 3 MG/1
1 POWDER NASAL ONCE
Refills: 0 | Status: DISCONTINUED | OUTPATIENT
Start: 2025-08-08 | End: 2025-08-08

## 2025-08-08 RX ORDER — LOSARTAN POTASSIUM 100 MG/1
25 TABLET, FILM COATED ORAL DAILY
Refills: 0 | Status: DISCONTINUED | OUTPATIENT
Start: 2025-08-08 | End: 2025-08-08

## 2025-08-08 RX ORDER — ATORVASTATIN CALCIUM 80 MG/1
80 TABLET, FILM COATED ORAL AT BEDTIME
Refills: 0 | Status: DISCONTINUED | OUTPATIENT
Start: 2025-08-08 | End: 2025-08-08

## 2025-08-08 RX ORDER — ANASTROZOLE 1 MG/1
1 TABLET ORAL DAILY
Refills: 0 | Status: DISCONTINUED | OUTPATIENT
Start: 2025-08-08 | End: 2025-08-08

## 2025-08-08 RX ORDER — ACETAMINOPHEN 500 MG/5ML
1000 LIQUID (ML) ORAL ONCE
Refills: 0 | Status: DISCONTINUED | OUTPATIENT
Start: 2025-08-08 | End: 2025-08-08

## 2025-08-08 RX ORDER — DEXTROSE 50 % IN WATER 50 %
15 SYRINGE (ML) INTRAVENOUS ONCE
Refills: 0 | Status: DISCONTINUED | OUTPATIENT
Start: 2025-08-08 | End: 2025-08-08

## 2025-08-08 RX ADMIN — INSULIN LISPRO 1: 100 INJECTION, SOLUTION INTRAVENOUS; SUBCUTANEOUS at 09:39

## 2025-08-08 RX ADMIN — ENOXAPARIN SODIUM 40 MILLIGRAM(S): 100 INJECTION SUBCUTANEOUS at 06:14

## 2025-08-08 RX ADMIN — LOSARTAN POTASSIUM 25 MILLIGRAM(S): 100 TABLET, FILM COATED ORAL at 06:14

## 2025-08-13 RX ORDER — LEUPROLIDE 42 MG/.37G
0 INJECTION, EMULSION SUBCUTANEOUS
Refills: 0 | DISCHARGE

## 2025-08-13 RX ORDER — ANASTROZOLE 1 MG/1
1 TABLET ORAL
Refills: 0 | DISCHARGE

## 2025-08-26 ENCOUNTER — APPOINTMENT (OUTPATIENT)
Dept: INFUSION THERAPY | Facility: HOSPITAL | Age: 50
End: 2025-08-26

## (undated) DEVICE — XI ARM SCISSOR POTTS

## (undated) DEVICE — DRSG TEGADERM 6"X8"

## (undated) DEVICE — SUT SOFSILK 2-0 18" V-20 (POP-OFF)

## (undated) DEVICE — GLV 8 PROTEXIS (WHITE)

## (undated) DEVICE — SUCTION YANKAUER NO CONTROL VENT

## (undated) DEVICE — SUT PLEDGET PRE PUNCH 4.8 X 9.5 X 1.5 MM

## (undated) DEVICE — GLV 7.5 PROTEXIS (WHITE)

## (undated) DEVICE — ELCTR BOVIE TIP BLADE INSULATED 2.75" EDGE

## (undated) DEVICE — POSITIONER FOAM EGG CRATE ULNAR 2PCS (PINK)

## (undated) DEVICE — TOURNIQUET SET TOURNIKWIK 12FR (4 TUBES, 1 SNARE) 7.5"

## (undated) DEVICE — TUBING STRYKER PNEUMOSURE HEATED RTP

## (undated) DEVICE — WARMING BLANKET DUO-THERM HYPER/HYPOTHERM ADULT

## (undated) DEVICE — PHRENIC NERVE PAD MEDIUM

## (undated) DEVICE — SOL IRR POUR H2O 250ML

## (undated) DEVICE — BAG PRESSURE 500ML

## (undated) DEVICE — DEFIBRILLATOR PAD PRE-CONNECT ADULT/CHILD

## (undated) DEVICE — MEDTRONIC URCHIN EVO HEART POSITIONER & CANISTER TUBING SET

## (undated) DEVICE — BLADE SCALPEL SAFETYLOCK #11

## (undated) DEVICE — D HELP - CLEARVIEW CLEARIFY SYSTEM

## (undated) DEVICE — DRAIN RESERVOIR FOR JACKSON PRATT 100CC CARDINAL

## (undated) DEVICE — SENSOR MYOCARDIAL TEMP 15MM

## (undated) DEVICE — SUT MONOCRYL 4-0 18" P-3 UNDYED

## (undated) DEVICE — SUT BLUNT SZ 5

## (undated) DEVICE — XI DRAPE ARM

## (undated) DEVICE — SUT DOUBLE 6 WIRE STERNAL

## (undated) DEVICE — GLV 6.5 PROTEXIS (WHITE)

## (undated) DEVICE — NDL HYPO SAFE 18G X 1.5" (PINK)

## (undated) DEVICE — SUT PROLENE 7-0 24" BV175-6

## (undated) DEVICE — DRAPE 3/4 SHEET 52X76"

## (undated) DEVICE — SUT SILK 2-0 30" SH

## (undated) DEVICE — SUT POLYSORB 1 36" GS-25

## (undated) DEVICE — SOL NORMOSOL-R PH7.4 1000ML

## (undated) DEVICE — TUBING SUCTION 20FT

## (undated) DEVICE — DRSG STERISTRIPS 0.5 X 4"

## (undated) DEVICE — SUT NUROLON 1 18" OS-8 (POP-OFF)

## (undated) DEVICE — PACING CABLE A/V TEMP SCREW DOWN 6FT

## (undated) DEVICE — DRAPE 3/4 SHEET W REINFORCEMENT 56X77"

## (undated) DEVICE — SUT PROLENE 4-0 36" BB

## (undated) DEVICE — SAVI SCOUT HANDPIECE

## (undated) DEVICE — SUT PROLENE 4-0 36" SH

## (undated) DEVICE — XI ARM NEEDLE DRIVER MEGA

## (undated) DEVICE — SWITCH ARISS TABLE MOUNT UNEQUAL LEGS 13"

## (undated) DEVICE — SYR LUER LOK 20CC

## (undated) DEVICE — SUT PDS II 2-0 27" CT-2

## (undated) DEVICE — SUT SILK 6-0 30" C-1

## (undated) DEVICE — SUT PROLENE 6-0 18" RB-2

## (undated) DEVICE — TUBING INSUFFLATION LAP FILTER 10FT

## (undated) DEVICE — SYR LUER LOK 30CC

## (undated) DEVICE — SUT ETHIBOND 0 18" TIES

## (undated) DEVICE — SYS VEIN HARVESTING VIRTUOSAPH PLUS W/ RADIAL

## (undated) DEVICE — ELCTR BOVIE PENCIL SMOKE EVACUATION

## (undated) DEVICE — ELCTR BOVIE TIP BLADE MEGADYNE E-Z CLEAN 4" EXTENDED

## (undated) DEVICE — DRAIN CHANNEL 19FR ROUND FULL FLUTED

## (undated) DEVICE — LUBRICANT INST ELECTROLUBE Z SOLUTION

## (undated) DEVICE — FOLEY TRAY 16FR 5CC LF LUBRISIL ADVANCE TEMP CLOSED

## (undated) DEVICE — DRAPE FLUID WARMER 44 X 66"

## (undated) DEVICE — DRAIN JACKSON PRATT 10MM FLAT FULL NO TROCAR

## (undated) DEVICE — ELCTR BOVIE TIP BLADE VALLEYLAB 6.5"

## (undated) DEVICE — VENTING ADAPTER "Y" (RED/BLUE) 7.5"

## (undated) DEVICE — POOLE SUCTION TIP

## (undated) DEVICE — BLADE SCALPEL SAFETYLOCK #10

## (undated) DEVICE — NDL HYPO REGULAR BEVEL 25G X 1.5" (BLUE)

## (undated) DEVICE — PACK UNIVERSAL CARDIAC

## (undated) DEVICE — SPECIMEN CONTAINER 100ML

## (undated) DEVICE — AORTIC PUNCH 5MM STANDARD HANDLE

## (undated) DEVICE — STOPCOCK 4-WAY 2 GANG W SWIVEL MALE LUER LOCK

## (undated) DEVICE — SUT VICRYL 0 27" CT

## (undated) DEVICE — MARKING PEN W RULER

## (undated) DEVICE — BULLDOG SPRING CLIP 6MM SOFT/SOFT

## (undated) DEVICE — POSITIONER CARDIAC BUMP

## (undated) DEVICE — STOPCOCK 4-WAY W SWIVEL MALE LUER LOCK NON VENTED RED CAP

## (undated) DEVICE — DRAPE LIGHT HANDLE COVER (BLUE)

## (undated) DEVICE — DRAPE INSTRUMENT POUCH 6.75" X 11"

## (undated) DEVICE — Device

## (undated) DEVICE — TISSUE STABILIZER MEDTRONIC OCTOPUS EVOLUTION

## (undated) DEVICE — URETERAL CATH RED RUBBER 8FR

## (undated) DEVICE — CLAMP BULLDOG MIDI 45 DEGREE (YELLOW) DISP

## (undated) DEVICE — XI ARM CLIP APPLIER SMALL

## (undated) DEVICE — XI DRAPE COLUMN

## (undated) DEVICE — SUMP INTRACARDIAC 20FR 1/4" ADULT

## (undated) DEVICE — SUT PROLENE 7-0 4-24" BV-1

## (undated) DEVICE — BLADE SCALPEL SAFETYLOCK #15

## (undated) DEVICE — SUT PDS II 0 27" OS-6

## (undated) DEVICE — NDL BLUNT 18G LOOP VESSEL MAXI WHITE

## (undated) DEVICE — SUT VICRYL 0 36" CTX UNDYED

## (undated) DEVICE — COVER PROBE T TIP INTRAOP LG

## (undated) DEVICE — SUT PROLENE 6-0 30" C-1

## (undated) DEVICE — DRAPE IOBAN 33" X 23"

## (undated) DEVICE — LAP PAD 18 X 18"

## (undated) DEVICE — CONNECTOR INTERSEPT "Y" 1/4 X 1/4 X 1/4"

## (undated) DEVICE — DRAPE MAYO STAND 30"

## (undated) DEVICE — SUT POLYSORB 0 36" GS-25 UNDYED

## (undated) DEVICE — SUT SOFSILK 2 60" TIES

## (undated) DEVICE — CONNECTOR STRAIGHT 3/8 X 1/2"

## (undated) DEVICE — SOL ANTI FOG

## (undated) DEVICE — AORTIC PUNCH 4.0MM STANDARD HANDLE

## (undated) DEVICE — SUT POLYSORB 2-0 30" GS-21 UNDYED

## (undated) DEVICE — DOPPLER PROBE 20MHZ DISP

## (undated) DEVICE — SYNOVIS VASCULAR PROBE 1.5MM 15CM

## (undated) DEVICE — SUT HOLDER INSERT FOR OCTOBASE STERNAL RETRACTOR

## (undated) DEVICE — DRSG DERMABOND 0.7ML

## (undated) DEVICE — SUT BIOSYN 4-0 18" P-12

## (undated) DEVICE — VESSEL LOOP MAXI-RED  0.120" X 16"

## (undated) DEVICE — CATH IV PROTECT PLUS 24G X 0.75"

## (undated) DEVICE — ELCTR GROUNDING PAD ADULT COVIDIEN

## (undated) DEVICE — DRAPE 1/2 SHEET 40X57"

## (undated) DEVICE — TUBING TUR 2 PRONG

## (undated) DEVICE — SUT VICRYL 2-0 27" CT-1

## (undated) DEVICE — ONETRAC LIGHTED RETRACTOR 135 X 30MM DISP

## (undated) DEVICE — DRSG OPSITE 13.75 X 4"

## (undated) DEVICE — SUT VICRYL 0 27" CT-3

## (undated) DEVICE — SAW BLADE MICROAIRE STERNUM 1X34X9.4MM

## (undated) DEVICE — NDL HYPO SAFE 20G X 1.5" (YELLOW)

## (undated) DEVICE — GOWN LG

## (undated) DEVICE — ONETRAC LIGHTED RETRACTOR 90 X 22MM DISP

## (undated) DEVICE — SUT PROLENE 4-0 36" RB-1

## (undated) DEVICE — ELCTR BOVIE PENCIL HANDPIECE ROCKER SWITCH 15FT

## (undated) DEVICE — ACROBAT SUV VACUUM OFF PUMP SYSTEM

## (undated) DEVICE — CHEST DRAIN PLEUR-EVAC WET/WET ADULT-PEDS SINGLE (QUICK)

## (undated) DEVICE — DRAPE TOWEL BLUE 17" X 24"

## (undated) DEVICE — GOWN TRIMAX LG

## (undated) DEVICE — SUT SILK 2-0 18" FS

## (undated) DEVICE — SYR ASEPTO

## (undated) DEVICE — DRSG OPSITE 2.5 X 2"

## (undated) DEVICE — VENODYNE/SCD SLEEVE CALF MEDIUM

## (undated) DEVICE — SUT BOOT STANDARD (ASSORTED) 5 PAIR

## (undated) DEVICE — SUT PROLENE 6-0 4-30" C-1

## (undated) DEVICE — BEAVER BLADE MINI SHARP ALL ROUND (BLUE)

## (undated) DEVICE — PACK MAJOR ABDOMINAL WITH LAP

## (undated) DEVICE — ELCTR BOVIE TIP BLADE MEGADYNE E-Z CLEAN 6.5" (LONG)

## (undated) DEVICE — DRAPE SLUSH / WARMER 44 X 66"

## (undated) DEVICE — XI SEAL UNIV 5- 8 MM

## (undated) DEVICE — LIGASURE SMALL JAW

## (undated) DEVICE — MULTIPLE PERFUSION SET FEMALE 1 INLET LEG W 4 LEGS 15" (BLUE/RED)

## (undated) DEVICE — WARMING BLANKET LOWER ADULT

## (undated) DEVICE — SUT MONOCRYL 4-0 27" PS-2 UNDYED

## (undated) DEVICE — DRAPE MAGNETIC INSTRUMENT MEDIUM

## (undated) DEVICE — SYR LUER LOK 5CC

## (undated) DEVICE — MEDICATION LABELS W MARKER

## (undated) DEVICE — STAPLER SKIN VISI-STAT 35 WIDE

## (undated) DEVICE — SUT PROLENE 7-0 24" BV175-8 MULTIPASS

## (undated) DEVICE — SOL IRR POUR NS 0.9% 500ML

## (undated) DEVICE — SUCTION TUBE CARDIAC SOFT TIP 6FR SHAFT 10FR TIP 6"

## (undated) DEVICE — VISITEC 4X4

## (undated) DEVICE — GLV 7 PROTEXIS (WHITE)

## (undated) DEVICE — SUT PDS II 3-0 36" SH

## (undated) DEVICE — SYR LUER LOK 10CC

## (undated) DEVICE — SUT PROLENE 3-0 36" SH

## (undated) DEVICE — PACK UNIVERSAL CARDIAC SUPPLEMNTAL B

## (undated) DEVICE — LIGASURE EXACT DISSECTOR

## (undated) DEVICE — GAMMA SLEEVE DISPOSABLE

## (undated) DEVICE — PACING CABLE (BROWN) A/V TEMP SCREW DOWN 12FT

## (undated) DEVICE — DRSG ACE BANDAGE 6"

## (undated) DEVICE — FOLEY TRAY 16FR 5CC LTX UMETER CLOSED

## (undated) DEVICE — GLV 8.5 PROTEXIS (WHITE)

## (undated) DEVICE — PREP CHLORAPREP HI-LITE ORANGE 26ML

## (undated) DEVICE — SUT VICRYL 3-0 18" SH UNDYED (POP-OFF)

## (undated) DEVICE — DRAPE SURGICAL #1010

## (undated) DEVICE — SUT SOFSILK 0 30" V-20

## (undated) DEVICE — DRAPE IOBAN 23" X 23"

## (undated) DEVICE — SYR LUER LOK 50CC

## (undated) DEVICE — SPONGE PEANUT AUTO COUNT

## (undated) DEVICE — SUCTION CATH ARGYLE WHISTLE TIP 14FR STRAIGHT PACKED

## (undated) DEVICE — DRSG DERMABOND PRINEO 60CM

## (undated) DEVICE — AORTIC PUNCH 4.0MM LONG LENGTH HANDLE

## (undated) DEVICE — BLOWER MISTER AXIUS WITH IV SET

## (undated) DEVICE — SUT SURGICAL STEEL 6 30" BP-1

## (undated) DEVICE — XI ARM PERMANENT CAUTERY SPATULA

## (undated) DEVICE — SUT SOFSILK 4-0 24" CV-15

## (undated) DEVICE — SUT VICRYL 1 36" CTX UNDYED

## (undated) DEVICE — XI ARM FORCEP BIPOLAR MICRO 8MM

## (undated) DEVICE — GETINGE VASOVIEW 7 ENDOSCOPIC VESSEL HARVESTING SYSTEM

## (undated) DEVICE — STABILIZER W STABLESOFT II LS